# Patient Record
Sex: FEMALE | Race: OTHER | HISPANIC OR LATINO
[De-identification: names, ages, dates, MRNs, and addresses within clinical notes are randomized per-mention and may not be internally consistent; named-entity substitution may affect disease eponyms.]

---

## 2016-04-13 RX ORDER — CALCIUM ACETATE 667 MG
2 TABLET ORAL
Qty: 0 | Refills: 0 | DISCHARGE
Start: 2016-04-13

## 2017-02-23 ENCOUNTER — APPOINTMENT (OUTPATIENT)
Dept: VASCULAR SURGERY | Facility: CLINIC | Age: 72
End: 2017-02-23

## 2017-03-09 ENCOUNTER — APPOINTMENT (OUTPATIENT)
Dept: VASCULAR SURGERY | Facility: CLINIC | Age: 72
End: 2017-03-09

## 2017-03-09 VITALS
DIASTOLIC BLOOD PRESSURE: 67 MMHG | SYSTOLIC BLOOD PRESSURE: 125 MMHG | TEMPERATURE: 98.4 F | BODY MASS INDEX: 28.05 KG/M2 | HEART RATE: 67 BPM | HEIGHT: 57 IN | WEIGHT: 130 LBS

## 2017-03-22 ENCOUNTER — APPOINTMENT (OUTPATIENT)
Dept: NEUROLOGY | Facility: CLINIC | Age: 72
End: 2017-03-22

## 2017-03-22 VITALS
WEIGHT: 130 LBS | BODY MASS INDEX: 28.05 KG/M2 | HEART RATE: 65 BPM | SYSTOLIC BLOOD PRESSURE: 131 MMHG | HEIGHT: 57 IN | DIASTOLIC BLOOD PRESSURE: 68 MMHG

## 2017-03-22 RX ORDER — NORTRIPTYLINE HYDROCHLORIDE 25 MG/1
25 CAPSULE ORAL
Qty: 90 | Refills: 0 | Status: ACTIVE | COMMUNITY
Start: 2017-03-22 | End: 1900-01-01

## 2017-03-22 RX ORDER — IBUPROFEN 600 MG/1
600 TABLET, FILM COATED ORAL
Qty: 20 | Refills: 0 | Status: ACTIVE | COMMUNITY
Start: 2016-11-30

## 2017-04-30 ENCOUNTER — FORM ENCOUNTER (OUTPATIENT)
Age: 72
End: 2017-04-30

## 2017-05-01 ENCOUNTER — OUTPATIENT (OUTPATIENT)
Dept: OUTPATIENT SERVICES | Facility: HOSPITAL | Age: 72
LOS: 1 days | End: 2017-05-01
Payer: COMMERCIAL

## 2017-05-01 ENCOUNTER — APPOINTMENT (OUTPATIENT)
Dept: MRI IMAGING | Facility: CLINIC | Age: 72
End: 2017-05-01

## 2017-05-01 DIAGNOSIS — M79.605 PAIN IN LEFT LEG: ICD-10-CM

## 2017-05-01 DIAGNOSIS — T82.392A OTHER MECHANICAL COMPLICATION OF FEMORAL ARTERIAL GRAFT (BYPASS), INITIAL ENCOUNTER: Chronic | ICD-10-CM

## 2017-05-01 PROCEDURE — 72148 MRI LUMBAR SPINE W/O DYE: CPT

## 2017-05-12 ENCOUNTER — APPOINTMENT (OUTPATIENT)
Dept: NEUROLOGY | Facility: CLINIC | Age: 72
End: 2017-05-12

## 2017-05-12 VITALS
BODY MASS INDEX: 28.05 KG/M2 | HEIGHT: 57 IN | DIASTOLIC BLOOD PRESSURE: 72 MMHG | WEIGHT: 130 LBS | SYSTOLIC BLOOD PRESSURE: 128 MMHG

## 2017-05-12 RX ORDER — VENLAFAXINE HYDROCHLORIDE 75 MG/1
75 CAPSULE, EXTENDED RELEASE ORAL DAILY
Qty: 90 | Refills: 0 | Status: ACTIVE | COMMUNITY
Start: 2017-05-12 | End: 1900-01-01

## 2017-05-17 ENCOUNTER — APPOINTMENT (OUTPATIENT)
Dept: NEUROLOGY | Facility: CLINIC | Age: 72
End: 2017-05-17

## 2017-09-14 ENCOUNTER — APPOINTMENT (OUTPATIENT)
Dept: VASCULAR SURGERY | Facility: CLINIC | Age: 72
End: 2017-09-14

## 2017-09-21 ENCOUNTER — APPOINTMENT (OUTPATIENT)
Dept: VASCULAR SURGERY | Facility: CLINIC | Age: 72
End: 2017-09-21

## 2017-09-28 ENCOUNTER — APPOINTMENT (OUTPATIENT)
Dept: VASCULAR SURGERY | Facility: CLINIC | Age: 72
End: 2017-09-28

## 2017-10-12 ENCOUNTER — APPOINTMENT (OUTPATIENT)
Dept: VASCULAR SURGERY | Facility: CLINIC | Age: 72
End: 2017-10-12
Payer: MEDICARE

## 2017-10-12 VITALS
HEART RATE: 77 BPM | BODY MASS INDEX: 29.12 KG/M2 | WEIGHT: 135 LBS | HEIGHT: 57 IN | SYSTOLIC BLOOD PRESSURE: 144 MMHG | DIASTOLIC BLOOD PRESSURE: 79 MMHG | TEMPERATURE: 98.2 F

## 2017-10-12 DIAGNOSIS — Z89.612 ACQUIRED ABSENCE OF LEFT LEG ABOVE KNEE: ICD-10-CM

## 2017-10-12 DIAGNOSIS — G54.6 PHANTOM LIMB SYNDROME WITH PAIN: ICD-10-CM

## 2017-10-12 PROCEDURE — 99214 OFFICE O/P EST MOD 30 MIN: CPT | Mod: 25

## 2017-10-12 PROCEDURE — 93923 UPR/LXTR ART STDY 3+ LVLS: CPT

## 2017-10-13 PROBLEM — G54.6 PHANTOM LIMB SYNDROME WITH PAIN: Status: ACTIVE | Noted: 2017-03-22

## 2017-10-20 ENCOUNTER — APPOINTMENT (OUTPATIENT)
Dept: CT IMAGING | Facility: IMAGING CENTER | Age: 72
End: 2017-10-20
Payer: MEDICARE

## 2017-10-20 ENCOUNTER — OUTPATIENT (OUTPATIENT)
Dept: OUTPATIENT SERVICES | Facility: HOSPITAL | Age: 72
LOS: 1 days | End: 2017-10-20
Payer: COMMERCIAL

## 2017-10-20 DIAGNOSIS — Z00.8 ENCOUNTER FOR OTHER GENERAL EXAMINATION: ICD-10-CM

## 2017-10-20 DIAGNOSIS — T82.392A OTHER MECHANICAL COMPLICATION OF FEMORAL ARTERIAL GRAFT (BYPASS), INITIAL ENCOUNTER: Chronic | ICD-10-CM

## 2017-10-20 PROCEDURE — 73706 CT ANGIO LWR EXTR W/O&W/DYE: CPT

## 2017-10-20 PROCEDURE — 73706 CT ANGIO LWR EXTR W/O&W/DYE: CPT | Mod: 26,50

## 2017-11-22 ENCOUNTER — OTHER (OUTPATIENT)
Age: 72
End: 2017-11-22

## 2018-09-01 ENCOUNTER — OUTPATIENT (OUTPATIENT)
Dept: OUTPATIENT SERVICES | Facility: HOSPITAL | Age: 73
LOS: 1 days | End: 2018-09-01
Payer: MEDICARE

## 2018-09-01 DIAGNOSIS — T82.392A OTHER MECHANICAL COMPLICATION OF FEMORAL ARTERIAL GRAFT (BYPASS), INITIAL ENCOUNTER: Chronic | ICD-10-CM

## 2018-09-09 ENCOUNTER — EMERGENCY (EMERGENCY)
Facility: HOSPITAL | Age: 73
LOS: 1 days | Discharge: HOME | End: 2018-09-09
Attending: STUDENT IN AN ORGANIZED HEALTH CARE EDUCATION/TRAINING PROGRAM
Payer: COMMERCIAL

## 2018-09-09 VITALS
HEIGHT: 60 IN | WEIGHT: 149.91 LBS | SYSTOLIC BLOOD PRESSURE: 101 MMHG | HEART RATE: 67 BPM | OXYGEN SATURATION: 100 % | DIASTOLIC BLOOD PRESSURE: 58 MMHG | RESPIRATION RATE: 16 BRPM | TEMPERATURE: 98 F

## 2018-09-09 DIAGNOSIS — T82.392A OTHER MECHANICAL COMPLICATION OF FEMORAL ARTERIAL GRAFT (BYPASS), INITIAL ENCOUNTER: Chronic | ICD-10-CM

## 2018-09-09 LAB
ALBUMIN SERPL ELPH-MCNC: 3.6 G/DL — SIGNIFICANT CHANGE UP (ref 3.5–5)
ALP SERPL-CCNC: 132 U/L — HIGH (ref 40–120)
ALT FLD-CCNC: 23 U/L DA — SIGNIFICANT CHANGE UP (ref 10–60)
ANION GAP SERPL CALC-SCNC: 14 MMOL/L — SIGNIFICANT CHANGE UP (ref 5–17)
APPEARANCE UR: CLEAR — SIGNIFICANT CHANGE UP
AST SERPL-CCNC: 22 U/L — SIGNIFICANT CHANGE UP (ref 10–40)
BILIRUB SERPL-MCNC: 0.3 MG/DL — SIGNIFICANT CHANGE UP (ref 0.2–1.2)
BILIRUB UR-MCNC: NEGATIVE — SIGNIFICANT CHANGE UP
BUN SERPL-MCNC: 50 MG/DL — HIGH (ref 7–18)
CALCIUM SERPL-MCNC: 9 MG/DL — SIGNIFICANT CHANGE UP (ref 8.4–10.5)
CHLORIDE SERPL-SCNC: 97 MMOL/L — SIGNIFICANT CHANGE UP (ref 96–108)
CO2 SERPL-SCNC: 26 MMOL/L — SIGNIFICANT CHANGE UP (ref 22–31)
COLOR SPEC: YELLOW — SIGNIFICANT CHANGE UP
CREAT SERPL-MCNC: 7.55 MG/DL — HIGH (ref 0.5–1.3)
DIFF PNL FLD: ABNORMAL
GLUCOSE SERPL-MCNC: 63 MG/DL — LOW (ref 70–99)
GLUCOSE UR QL: 50 MG/DL
HCT VFR BLD CALC: 43.6 % — SIGNIFICANT CHANGE UP (ref 34.5–45)
HGB BLD-MCNC: 13.8 G/DL — SIGNIFICANT CHANGE UP (ref 11.5–15.5)
KETONES UR-MCNC: NEGATIVE — SIGNIFICANT CHANGE UP
LEUKOCYTE ESTERASE UR-ACNC: ABNORMAL
MCHC RBC-ENTMCNC: 30.2 PG — SIGNIFICANT CHANGE UP (ref 27–34)
MCHC RBC-ENTMCNC: 31.7 GM/DL — LOW (ref 32–36)
MCV RBC AUTO: 95.3 FL — SIGNIFICANT CHANGE UP (ref 80–100)
NITRITE UR-MCNC: NEGATIVE — SIGNIFICANT CHANGE UP
PH UR: 6 — SIGNIFICANT CHANGE UP (ref 5–8)
PLATELET # BLD AUTO: 180 K/UL — SIGNIFICANT CHANGE UP (ref 150–400)
POTASSIUM SERPL-MCNC: 4.4 MMOL/L — SIGNIFICANT CHANGE UP (ref 3.5–5.3)
POTASSIUM SERPL-SCNC: 4.4 MMOL/L — SIGNIFICANT CHANGE UP (ref 3.5–5.3)
PROT SERPL-MCNC: 8.1 G/DL — SIGNIFICANT CHANGE UP (ref 6–8.3)
PROT UR-MCNC: 100
RBC # BLD: 4.57 M/UL — SIGNIFICANT CHANGE UP (ref 3.8–5.2)
RBC # FLD: 16.2 % — HIGH (ref 10.3–14.5)
SODIUM SERPL-SCNC: 137 MMOL/L — SIGNIFICANT CHANGE UP (ref 135–145)
SP GR SPEC: 1.01 — SIGNIFICANT CHANGE UP (ref 1.01–1.02)
TROPONIN I SERPL-MCNC: <0.015 NG/ML — SIGNIFICANT CHANGE UP (ref 0–0.04)
UROBILINOGEN FLD QL: NEGATIVE — SIGNIFICANT CHANGE UP
WBC # BLD: 8.8 K/UL — SIGNIFICANT CHANGE UP (ref 3.8–10.5)
WBC # FLD AUTO: 8.8 K/UL — SIGNIFICANT CHANGE UP (ref 3.8–10.5)

## 2018-09-09 PROCEDURE — 82962 GLUCOSE BLOOD TEST: CPT

## 2018-09-09 PROCEDURE — 84484 ASSAY OF TROPONIN QUANT: CPT

## 2018-09-09 PROCEDURE — 71045 X-RAY EXAM CHEST 1 VIEW: CPT | Mod: 26

## 2018-09-09 PROCEDURE — 99283 EMERGENCY DEPT VISIT LOW MDM: CPT | Mod: 25

## 2018-09-09 PROCEDURE — 81001 URINALYSIS AUTO W/SCOPE: CPT

## 2018-09-09 PROCEDURE — 80053 COMPREHEN METABOLIC PANEL: CPT

## 2018-09-09 PROCEDURE — 85027 COMPLETE CBC AUTOMATED: CPT

## 2018-09-09 PROCEDURE — 71045 X-RAY EXAM CHEST 1 VIEW: CPT

## 2018-09-09 PROCEDURE — 93005 ELECTROCARDIOGRAM TRACING: CPT

## 2018-09-09 PROCEDURE — 99285 EMERGENCY DEPT VISIT HI MDM: CPT | Mod: 25

## 2018-09-09 RX ORDER — CEPHALEXIN 500 MG
1 CAPSULE ORAL
Qty: 14 | Refills: 0
Start: 2018-09-09 | End: 2018-09-15

## 2018-09-09 NOTE — ED ADULT NURSE NOTE - NSIMPLEMENTINTERV_GEN_ALL_ED
Implemented All Fall with Harm Risk Interventions:  [place] to call system. Call bell, personal items and telephone within reach. Instruct patient to call for assistance. Room bathroom lighting operational. Non-slip footwear when patient is off stretcher. Physically safe environment: no spills, clutter or unnecessary equipment. Stretcher in lowest position, wheels locked, appropriate side rails in place. Provide visual cue, wrist band, yellow gown, etc. Monitor gait and stability. Monitor for mental status changes and reorient to person, place, and time. Review medications for side effects contributing to fall risk. Reinforce activity limits and safety measures with patient and family. Provide visual clues: red socks.

## 2018-09-09 NOTE — ED PROVIDER NOTE - MEDICAL DECISION MAKING DETAILS
Patient well appearing. vital normal. Given risk factors concern for acs vs infection vs lyte abnormality vs anemia  no vertigo likely sensation, normal exam, no focal weakness, unlikely neuro related  will obtian basic lab, ekg, cxr, trop, ua  otherwise given no cp, sob, if trop negative, likely stable for discharge

## 2018-09-09 NOTE — ED PROVIDER NOTE - NEUROLOGICAL, MLM
Alert and oriented, no focal deficits, no motor or sensory deficits. stable gait, normal FTN, no ataxia

## 2018-09-09 NOTE — ED PROVIDER NOTE - PROGRESS NOTE DETAILS
Per patient and family, patient lightheadedness resolved. endorses feeling well. endorse that they want to go home to make her eye surgery appointment in the morning

## 2018-09-09 NOTE — ED PROVIDER NOTE - OBJECTIVE STATEMENT
lightheaded, no cp 72 y.o w/ pmh of ckd, dm, cad presenting with episode of lightheadedness in the morning. denies cp, sob, n, v, abd pain, fever, room spinning sensation, headache. otherwise endorses that lightheaded has been improving. denies difficulty ambulating.

## 2018-09-09 NOTE — ED PROVIDER NOTE - PMH
Anemia in chronic kidney disease  last PRBC transfusion January 2016  Asymmetrical hearing loss, left    Bacteremia  diagnosed 2 weeks ago   been treated with IV cefazolin 200 mg on TUES and THUR and 300 mg on SAT, for 21 days, last dose on 5/10/16  CAD (coronary artery disease)  S/P stent  Congestive heart failure  last admission8/2015  most recent echo on chart  Diabetes mellitus  type II,     fingerstick range in the morning 88- 102 mg/dl  ESRD (end stage renal disease) on dialysis  patiient on hemodialysis  HTN (hypertension)    Hyperlipidemia  dyslipidemia  Peripheral neuropathy    Peripheral vascular disease    Stented coronary artery

## 2018-09-09 NOTE — ED ADULT NURSE NOTE - OBJECTIVE STATEMENT
Pt c/o of general weakness, A&O x3, able to make needs known. Pt has a left AKA, uses wheelchair to bathroom at home, skin is intact.

## 2018-10-01 DIAGNOSIS — Z76.89 PERSONS ENCOUNTERING HEALTH SERVICES IN OTHER SPECIFIED CIRCUMSTANCES: ICD-10-CM

## 2018-10-01 DIAGNOSIS — Z71.89 OTHER SPECIFIED COUNSELING: ICD-10-CM

## 2018-11-30 ENCOUNTER — APPOINTMENT (OUTPATIENT)
Dept: VASCULAR SURGERY | Facility: CLINIC | Age: 73
End: 2018-11-30
Payer: MEDICARE

## 2018-11-30 VITALS
WEIGHT: 145 LBS | TEMPERATURE: 97.9 F | BODY MASS INDEX: 31.28 KG/M2 | HEIGHT: 57 IN | DIASTOLIC BLOOD PRESSURE: 57 MMHG | SYSTOLIC BLOOD PRESSURE: 132 MMHG | HEART RATE: 79 BPM

## 2018-11-30 DIAGNOSIS — I70.362: ICD-10-CM

## 2018-11-30 DIAGNOSIS — I73.9 PERIPHERAL VASCULAR DISEASE, UNSPECIFIED: ICD-10-CM

## 2018-11-30 DIAGNOSIS — M79.605 PAIN IN LEFT LEG: ICD-10-CM

## 2018-11-30 DIAGNOSIS — I96 GANGRENE, NOT ELSEWHERE CLASSIFIED: ICD-10-CM

## 2018-11-30 DIAGNOSIS — L97.422 NON-PRESSURE CHRONIC ULCER OF LEFT HEEL AND MIDFOOT WITH FAT LAYER EXPOSED: ICD-10-CM

## 2018-11-30 PROCEDURE — 99212 OFFICE O/P EST SF 10 MIN: CPT

## 2018-11-30 PROCEDURE — 93923 UPR/LXTR ART STDY 3+ LVLS: CPT

## 2019-05-01 PROCEDURE — G9005: CPT

## 2019-10-02 PROBLEM — Z89.612 HISTORY OF LEFT ABOVE KNEE AMPUTATION: Status: ACTIVE | Noted: 2017-03-08

## 2019-12-01 ENCOUNTER — OUTPATIENT (OUTPATIENT)
Dept: OUTPATIENT SERVICES | Facility: HOSPITAL | Age: 74
LOS: 1 days | End: 2019-12-01
Payer: MEDICARE

## 2019-12-01 DIAGNOSIS — T82.392A OTHER MECHANICAL COMPLICATION OF FEMORAL ARTERIAL GRAFT (BYPASS), INITIAL ENCOUNTER: Chronic | ICD-10-CM

## 2019-12-01 PROCEDURE — G9001: CPT

## 2019-12-26 ENCOUNTER — INPATIENT (INPATIENT)
Facility: HOSPITAL | Age: 74
LOS: 3 days | Discharge: ANOTHER HOSPITAL | DRG: 305 | End: 2019-12-30
Attending: INTERNAL MEDICINE | Admitting: INTERNAL MEDICINE
Payer: COMMERCIAL

## 2019-12-26 VITALS
SYSTOLIC BLOOD PRESSURE: 170 MMHG | WEIGHT: 134.92 LBS | HEART RATE: 79 BPM | OXYGEN SATURATION: 100 % | DIASTOLIC BLOOD PRESSURE: 92 MMHG | TEMPERATURE: 98 F | RESPIRATION RATE: 16 BRPM
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
## 2019-12-26 DIAGNOSIS — Z29.9 ENCOUNTER FOR PROPHYLACTIC MEASURES, UNSPECIFIED: ICD-10-CM

## 2019-12-26 DIAGNOSIS — T82.392A OTHER MECHANICAL COMPLICATION OF FEMORAL ARTERIAL GRAFT (BYPASS), INITIAL ENCOUNTER: Chronic | ICD-10-CM

## 2019-12-26 DIAGNOSIS — I25.10 ATHEROSCLEROTIC HEART DISEASE OF NATIVE CORONARY ARTERY WITHOUT ANGINA PECTORIS: ICD-10-CM
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
## 2019-12-26 DIAGNOSIS — N18.6 END STAGE RENAL DISEASE: ICD-10-CM

## 2019-12-26 DIAGNOSIS — F32.9 MAJOR DEPRESSIVE DISORDER, SINGLE EPISODE, UNSPECIFIED: ICD-10-CM

## 2019-12-26 DIAGNOSIS — I10 ESSENTIAL (PRIMARY) HYPERTENSION: ICD-10-CM

## 2019-12-26 DIAGNOSIS — R73.9 HYPERGLYCEMIA, UNSPECIFIED: ICD-10-CM

## 2019-12-26 LAB
ACETONE SERPL-MCNC: NEGATIVE — SIGNIFICANT CHANGE UP
ALBUMIN SERPL ELPH-MCNC: 4 G/DL — SIGNIFICANT CHANGE UP (ref 3.5–5)
ALP SERPL-CCNC: 133 U/L — HIGH (ref 40–120)
ALT FLD-CCNC: 27 U/L DA — SIGNIFICANT CHANGE UP (ref 10–60)
ANION GAP SERPL CALC-SCNC: 13 MMOL/L — SIGNIFICANT CHANGE UP (ref 5–17)
APTT BLD: 29.9 SEC — SIGNIFICANT CHANGE UP (ref 27.5–36.3)
AST SERPL-CCNC: 33 U/L — SIGNIFICANT CHANGE UP (ref 10–40)
BASE EXCESS BLDV CALC-SCNC: -4.2 MMOL/L — LOW (ref -2–2)
BASOPHILS # BLD AUTO: 0.04 K/UL — SIGNIFICANT CHANGE UP (ref 0–0.2)
BASOPHILS NFR BLD AUTO: 0.4 % — SIGNIFICANT CHANGE UP (ref 0–2)
BILIRUB SERPL-MCNC: 0.3 MG/DL — SIGNIFICANT CHANGE UP (ref 0.2–1.2)
BUN SERPL-MCNC: 71 MG/DL — HIGH (ref 7–18)
CALCIUM SERPL-MCNC: 11 MG/DL — HIGH (ref 8.4–10.5)
CHLORIDE SERPL-SCNC: 100 MMOL/L — SIGNIFICANT CHANGE UP (ref 96–108)
CO2 SERPL-SCNC: 22 MMOL/L — SIGNIFICANT CHANGE UP (ref 22–31)
CREAT SERPL-MCNC: 8.48 MG/DL — HIGH (ref 0.5–1.3)
EOSINOPHIL # BLD AUTO: 0.32 K/UL — SIGNIFICANT CHANGE UP (ref 0–0.5)
EOSINOPHIL NFR BLD AUTO: 3.5 % — SIGNIFICANT CHANGE UP (ref 0–6)
GLUCOSE BLDC GLUCOMTR-MCNC: 378 MG/DL — HIGH (ref 70–99)
GLUCOSE BLDC GLUCOMTR-MCNC: 405 MG/DL — HIGH (ref 70–99)
GLUCOSE BLDC GLUCOMTR-MCNC: 96 MG/DL — SIGNIFICANT CHANGE UP (ref 70–99)
GLUCOSE SERPL-MCNC: 526 MG/DL — CRITICAL HIGH (ref 70–99)
HCO3 BLDV-SCNC: 23 MMOL/L — SIGNIFICANT CHANGE UP (ref 21–29)
HCT VFR BLD CALC: 45.2 % — HIGH (ref 34.5–45)
HGB BLD-MCNC: 14.1 G/DL — SIGNIFICANT CHANGE UP (ref 11.5–15.5)
HOROWITZ INDEX BLDV+IHG-RTO: 21 — SIGNIFICANT CHANGE UP
IMM GRANULOCYTES NFR BLD AUTO: 0.6 % — SIGNIFICANT CHANGE UP (ref 0–1.5)
INR BLD: 0.91 RATIO — SIGNIFICANT CHANGE UP (ref 0.88–1.16)
LACTATE SERPL-SCNC: 1.4 MMOL/L — SIGNIFICANT CHANGE UP (ref 0.7–2)
LYMPHOCYTES # BLD AUTO: 0.87 K/UL — LOW (ref 1–3.3)
LYMPHOCYTES # BLD AUTO: 9.6 % — LOW (ref 13–44)
MCHC RBC-ENTMCNC: 29.7 PG — SIGNIFICANT CHANGE UP (ref 27–34)
MCHC RBC-ENTMCNC: 31.2 GM/DL — LOW (ref 32–36)
MCV RBC AUTO: 95.2 FL — SIGNIFICANT CHANGE UP (ref 80–100)
MONOCYTES # BLD AUTO: 0.54 K/UL — SIGNIFICANT CHANGE UP (ref 0–0.9)
MONOCYTES NFR BLD AUTO: 5.9 % — SIGNIFICANT CHANGE UP (ref 2–14)
NEUTROPHILS # BLD AUTO: 7.27 K/UL — SIGNIFICANT CHANGE UP (ref 1.8–7.4)
NEUTROPHILS NFR BLD AUTO: 80 % — HIGH (ref 43–77)
NRBC # BLD: 0 /100 WBCS — SIGNIFICANT CHANGE UP (ref 0–0)
PCO2 BLDV: 54 MMHG — HIGH (ref 35–50)
PH BLDV: 7.25 — LOW (ref 7.35–7.45)
PLATELET # BLD AUTO: 174 K/UL — SIGNIFICANT CHANGE UP (ref 150–400)
PO2 BLDV: 25 MMHG — SIGNIFICANT CHANGE UP (ref 25–45)
POTASSIUM SERPL-MCNC: 4.4 MMOL/L — SIGNIFICANT CHANGE UP (ref 3.5–5.3)
POTASSIUM SERPL-SCNC: 4.4 MMOL/L — SIGNIFICANT CHANGE UP (ref 3.5–5.3)
PROT SERPL-MCNC: 8.1 G/DL — SIGNIFICANT CHANGE UP (ref 6–8.3)
PROTHROM AB SERPL-ACNC: 10.1 SEC — SIGNIFICANT CHANGE UP (ref 10–12.9)
RBC # BLD: 4.75 M/UL — SIGNIFICANT CHANGE UP (ref 3.8–5.2)
RBC # FLD: 15.8 % — HIGH (ref 10.3–14.5)
SAO2 % BLDV: 34 % — LOW (ref 67–88)
SODIUM SERPL-SCNC: 135 MMOL/L — SIGNIFICANT CHANGE UP (ref 135–145)
WBC # BLD: 9.09 K/UL — SIGNIFICANT CHANGE UP (ref 3.8–10.5)
WBC # FLD AUTO: 9.09 K/UL — SIGNIFICANT CHANGE UP (ref 3.8–10.5)

## 2019-12-26 PROCEDURE — 71045 X-RAY EXAM CHEST 1 VIEW: CPT | Mod: 26

## 2019-12-26 PROCEDURE — 99285 EMERGENCY DEPT VISIT HI MDM: CPT

## 2019-12-26 PROCEDURE — 73562 X-RAY EXAM OF KNEE 3: CPT | Mod: 26,RT

## 2019-12-26 PROCEDURE — 72170 X-RAY EXAM OF PELVIS: CPT | Mod: 26

## 2019-12-26 RX ORDER — CARVEDILOL PHOSPHATE 80 MG/1
12.5 CAPSULE, EXTENDED RELEASE ORAL EVERY 12 HOURS
Refills: 0 | Status: DISCONTINUED | OUTPATIENT
Start: 2019-12-26 | End: 2019-12-30

## 2019-12-26 RX ORDER — PANTOPRAZOLE SODIUM 20 MG/1
40 TABLET, DELAYED RELEASE ORAL
Refills: 0 | Status: DISCONTINUED | OUTPATIENT
Start: 2019-12-26 | End: 2019-12-30

## 2019-12-26 RX ORDER — CALCITRIOL 0.5 UG/1
0.25 CAPSULE ORAL DAILY
Refills: 0 | Status: DISCONTINUED | OUTPATIENT
Start: 2019-12-26 | End: 2019-12-30

## 2019-12-26 RX ORDER — LOSARTAN POTASSIUM 100 MG/1
25 TABLET, FILM COATED ORAL DAILY
Refills: 0 | Status: DISCONTINUED | OUTPATIENT
Start: 2019-12-26 | End: 2019-12-30

## 2019-12-26 RX ORDER — HEPARIN SODIUM 5000 [USP'U]/ML
5000 INJECTION INTRAVENOUS; SUBCUTANEOUS EVERY 12 HOURS
Refills: 0 | Status: DISCONTINUED | OUTPATIENT
Start: 2019-12-26 | End: 2019-12-30

## 2019-12-26 RX ORDER — CALCIUM ACETATE 667 MG
1334 TABLET ORAL
Refills: 0 | Status: DISCONTINUED | OUTPATIENT
Start: 2019-12-26 | End: 2019-12-30

## 2019-12-26 RX ORDER — SODIUM CHLORIDE 9 MG/ML
500 INJECTION INTRAMUSCULAR; INTRAVENOUS; SUBCUTANEOUS ONCE
Refills: 0 | Status: COMPLETED | OUTPATIENT
Start: 2019-12-26 | End: 2019-12-26

## 2019-12-26 RX ORDER — INSULIN HUMAN 100 [IU]/ML
10 INJECTION, SOLUTION SUBCUTANEOUS ONCE
Refills: 0 | Status: COMPLETED | OUTPATIENT
Start: 2019-12-26 | End: 2019-12-26

## 2019-12-26 RX ORDER — DULOXETINE HYDROCHLORIDE 30 MG/1
20 CAPSULE, DELAYED RELEASE ORAL EVERY 12 HOURS
Refills: 0 | Status: DISCONTINUED | OUTPATIENT
Start: 2019-12-26 | End: 2019-12-30

## 2019-12-26 RX ORDER — INSULIN LISPRO 100/ML
VIAL (ML) SUBCUTANEOUS
Refills: 0 | Status: DISCONTINUED | OUTPATIENT
Start: 2019-12-26 | End: 2019-12-30

## 2019-12-26 RX ORDER — CLOPIDOGREL BISULFATE 75 MG/1
75 TABLET, FILM COATED ORAL DAILY
Refills: 0 | Status: DISCONTINUED | OUTPATIENT
Start: 2019-12-26 | End: 2019-12-30

## 2019-12-26 RX ORDER — ATORVASTATIN CALCIUM 80 MG/1
20 TABLET, FILM COATED ORAL AT BEDTIME
Refills: 0 | Status: DISCONTINUED | OUTPATIENT
Start: 2019-12-26 | End: 2019-12-30

## 2019-12-26 RX ORDER — ACETAMINOPHEN 500 MG
650 TABLET ORAL ONCE
Refills: 0 | Status: COMPLETED | OUTPATIENT
Start: 2019-12-26 | End: 2019-12-26

## 2019-12-26 RX ADMIN — Medication 650 MILLIGRAM(S): at 12:20

## 2019-12-26 RX ADMIN — Medication 650 MILLIGRAM(S): at 14:51

## 2019-12-26 RX ADMIN — INSULIN HUMAN 10 UNIT(S): 100 INJECTION, SOLUTION SUBCUTANEOUS at 13:33

## 2019-12-26 RX ADMIN — Medication 12: at 16:11

## 2019-12-26 RX ADMIN — SODIUM CHLORIDE 500 MILLILITER(S): 9 INJECTION INTRAMUSCULAR; INTRAVENOUS; SUBCUTANEOUS at 12:20

## 2019-12-26 NOTE — H&P ADULT - NSHPSOCIALHISTORY_GEN_ALL_CORE
pt does not smoke, drink etoh or has substance use  pt walks with prosthetic left leg   lives with sister

## 2019-12-26 NOTE — ED ADULT NURSE NOTE - NSIMPLEMENTINTERV_GEN_ALL_ED
Implemented All Fall with Harm Risk Interventions:  [place] to call system. Call bell, personal items and telephone within reach. Instruct patient to call for assistance. Room bathroom lighting operational. Non-slip footwear when patient is off stretcher. Physically safe environment: no spills, clutter or unnecessary equipment. Stretcher in lowest position, wheels locked, appropriate side rails in place. Provide visual cue, wrist band, yellow gown, etc. Monitor gait and stability. Monitor for mental status changes and reorient to person, place, and time. Review medications for side effects contributing to fall risk. Reinforce activity limits and safety measures with patient and family. Provide visual clues: red socks.

## 2019-12-26 NOTE — H&P ADULT - PROBLEM SELECTOR PLAN 2
pt came in with hyperglycemia of 500   improved to 96   pt takes long acting 18 units and hss  c/w hss for now  f/u hba1c

## 2019-12-26 NOTE — H&P ADULT - HISTORY OF PRESENT ILLNESS
74 year old female with PMhx of HTN, HLD, CAD, DM, osteo arthritis and ESRD (Tu,Th,Sat, on Dialysis last completed on Monday, [facility] kidney [facility]) and PSHx of amputated left leg above the knee presenting with home attendant complaining of high blood glucose and fall last night.  Per sister at bedside, pt has severe arthritis in her right knee and last night when pt was trying to use commode, pt fell down because of her right knee pain and fell on right knee. Pt did not loss consciousness, or hit her head. Patient states that when she fell, she landed on her right knee which is now in pain. Patient denies any head trauma or HA, LOC, dizziness, chest pain, shortness of breath, vomiting, abd pain, dysuria, or any other acute complaints.

## 2019-12-26 NOTE — ED PROVIDER NOTE - PHYSICAL EXAMINATION
Afebrile, hemodynamically stable, saturating well  NAD, well appearing, no increased WOB  Head NCAT  Pupils equal, EOMI grossly, anicteric  MMM  RRR, nml S1/S2, no m/r/g  Lungs CTAB, no w/r/r  Abd soft, NT, ND, nml BS, no rebound or guarding  AAO, CN's 3-12 grossly intact  Mild R knee TTP, no stepoff/deformity/bruising to entire extremity, nml warmth/color/sensation  Skin warm, well perfused, no rashes or hives

## 2019-12-26 NOTE — ED PROVIDER NOTE - CARE PLAN
Principal Discharge DX:	Fall from bed, initial encounter  Secondary Diagnosis:	Hyperglycemia  Secondary Diagnosis:	Acute pain of right knee Principal Discharge DX:	Fall from bed, initial encounter  Secondary Diagnosis:	Hyperglycemia  Secondary Diagnosis:	Acute pain of right knee  Secondary Diagnosis:	ESRD (end stage renal disease) on dialysis

## 2019-12-26 NOTE — PATIENT PROFILE ADULT - ABILITY TO HEAR (WITH HEARING AID OR HEARING APPLIANCE IF NORMALLY USED):
cannot hear from left ear/Mildly to Moderately Impaired: difficulty hearing in some environments or speaker may need to increase volume or speak distinctly

## 2019-12-26 NOTE — H&P ADULT - ASSESSMENT
74 year old female with PMhx of HTN, HLD, CAD, DM, osteo arthritis and ESRD (Tu,Th,Sat, on Dialysis last completed on Monday, [facility] kidney [facility]) and PSHx of amputated left leg above the knee presenting with home attendant complaining of high blood glucose and fall last night.

## 2019-12-26 NOTE — H&P ADULT - PROBLEM SELECTOR PLAN 1
mechanical fall 2/2 arthritis   xray R knee and pelvic negative for fracture   will do pain management and physical therapy

## 2019-12-26 NOTE — ED PROVIDER NOTE - OBJECTIVE STATEMENT
used Number: [id number]  74 year old female with PMhx of DM and ESRF (on Dialysis last completed on Monday) and PSHx of amputated left leg above the knee presenting with home attendant complaining of high blood glucose and fall last night. According to home health aide, while patient was at home, she slipped trying to get out of bed at around 2AM. Patient states that when she fell, she landed on her right knee which is now in pain. Patient denies any head trauma, LOC, dizziness, chest pain, shortness of breath, vomiting, abd pain, or any other acute complaints. Patient medications include aspirin, carvedilol, Plavix, Losartan, atorvastatin, Lantus (13 units at night), and Novolog 3/6 units (scaled). Patient is non smoker. 74 year old female with PMhx of DM and ESRD (on Dialysis last completed on Monday, uncertain location/doctor) and PSHx of amputated left leg above the knee presenting with home attendant complaining of high blood glucose and fall last night. Pt states she slipped trying to get out of bed at around 2AM. Patient states that when she fell, she landed on her right knee which is now in pain. Patient denies any head trauma or HA, LOC, dizziness, chest pain, shortness of breath, vomiting, abd pain, dysuria, or any other acute complaints. Patient medications include aspirin, carvedilol, Plavix, Losartan, atorvastatin, Lantus (13 units at night), and Novolog 3/6 units (scaled). Patient is non smoker. HHA here and unable to provide further hx. 74 year old female with PMhx of DM and ESRD (Tu,Th,Sat, on Dialysis last completed on Monday, uncertain location/doctor) and PSHx of amputated left leg above the knee presenting with home attendant complaining of high blood glucose and fall last night. Pt states she slipped onto the floor when trying to get out of bed at around 2AM. Patient states that when she fell, she landed on her right knee which is now in pain. Patient denies any head trauma or HA, LOC, dizziness, chest pain, shortness of breath, vomiting, abd pain, dysuria, or any other acute complaints. Patient medications include aspirin, carvedilol, Plavix, Losartan, atorvastatin, Lantus (13 units at night), and Novolog 3/6 units (scaled). Patient is non smoker. HHA here and unable to provide further hx.

## 2019-12-26 NOTE — ED PROVIDER NOTE - CLINICAL SUMMARY MEDICAL DECISION MAKING FREE TEXT BOX
No e/o head trauma, cord involvement, chest or abdominal trauma or process. No e/o extremity fracture and neurovascularly intact extremity. Mechanical fall with no arrhythmia. No new anemia or bleeding. No arrhythmia. No e/o DVT or suggest of PE. No gross electrolyte abnormality. No e/o head trauma, cord involvement, chest or abdominal trauma or process. No e/o extremity fracture and neurovascularly intact extremity. Mechanical fall with no arrhythmia. No new anemia or bleeding. No arrhythmia. No e/o DVT or suggest of PE. No gross electrolyte abnormality other than hyperglycemia. No e/o DKA. Given 500 bolus fluids, insulin subQ. Patient well appearing, hemodynamically stable. Admitted to internal medicine for further monitoring, w/u, and care.

## 2019-12-26 NOTE — ED PROVIDER NOTE - PSH
Arteriovenous fistula  Left  AVF (arteriovenous fistula)  right anterior chest wall (placed May 2015 at [facility])  Femoral-popliteal bypass graft occlusion, left    S/P cataract surgery  Bilateral

## 2019-12-26 NOTE — H&P ADULT - NSHPPHYSICALEXAM_GEN_ALL_CORE
Vital Signs Last 24 Hrs  T(C): 36.3 (26 Dec 2019 15:42), Max: 37.2 (26 Dec 2019 10:05)  T(F): 97.4 (26 Dec 2019 15:42), Max: 98.9 (26 Dec 2019 10:05)  HR: 69 (26 Dec 2019 15:42) (69 - 79)  BP: 177/48 (26 Dec 2019 15:42) (170/92 - 177/48)  BP(mean): --  RR: 17 (26 Dec 2019 15:42) (16 - 17)  SpO2: 100% (26 Dec 2019 15:42) (100% - 100%)    PHYSICAL EXAM:  GENERAL: NAD, well-developed  HEAD:  Atraumatic, Normocephalic  EYES: EOMI, PERRLA, conjunctiva and sclera clear  NECK: Supple, No JVD  CHEST/LUNG: Clear to auscultation bilaterally; No wheeze, has horseshoe dialysis access on left chest    HEART: Regular rate and rhythm; s1+ s2+, systolic murmur   ABDOMEN: Soft, Nontender, Nondistended; Bowel sounds present  EXTREMITIES: No clubbing, cyanosis, or edema, s/p left AKA, no ulcer, erythema or swelling around stump.    NEUROLOGY:AAOx3 non-focal  SKIN: No rashes or lesions

## 2019-12-26 NOTE — ED ADULT NURSE NOTE - OBJECTIVE STATEMENT
general weakness, unable to get up from toilet seat. As per EMS glucose/FS was HI. left AKA , dialysis Mon, Wed, Fri, left chest port

## 2019-12-27 DIAGNOSIS — T87.89 OTHER COMPLICATIONS OF AMPUTATION STUMP: ICD-10-CM

## 2019-12-27 DIAGNOSIS — Z71.89 OTHER SPECIFIED COUNSELING: ICD-10-CM

## 2019-12-27 LAB
24R-OH-CALCIDIOL SERPL-MCNC: 33.5 NG/ML — SIGNIFICANT CHANGE UP (ref 30–80)
ALBUMIN SERPL ELPH-MCNC: 3.1 G/DL — LOW (ref 3.5–5)
ALP SERPL-CCNC: 102 U/L — SIGNIFICANT CHANGE UP (ref 40–120)
ALT FLD-CCNC: 20 U/L DA — SIGNIFICANT CHANGE UP (ref 10–60)
ANION GAP SERPL CALC-SCNC: 8 MMOL/L — SIGNIFICANT CHANGE UP (ref 5–17)
AST SERPL-CCNC: 31 U/L — SIGNIFICANT CHANGE UP (ref 10–40)
BILIRUB SERPL-MCNC: 0.3 MG/DL — SIGNIFICANT CHANGE UP (ref 0.2–1.2)
BUN SERPL-MCNC: 32 MG/DL — HIGH (ref 7–18)
CALCIUM SERPL-MCNC: 9.4 MG/DL — SIGNIFICANT CHANGE UP (ref 8.4–10.5)
CALCIUM SERPL-MCNC: 9.6 MG/DL — SIGNIFICANT CHANGE UP (ref 8.4–10.5)
CHLORIDE SERPL-SCNC: 101 MMOL/L — SIGNIFICANT CHANGE UP (ref 96–108)
CHOLEST SERPL-MCNC: 132 MG/DL — SIGNIFICANT CHANGE UP (ref 10–199)
CO2 SERPL-SCNC: 28 MMOL/L — SIGNIFICANT CHANGE UP (ref 22–31)
CREAT SERPL-MCNC: 5.63 MG/DL — HIGH (ref 0.5–1.3)
FERRITIN SERPL-MCNC: 951 NG/ML — HIGH (ref 15–150)
GLUCOSE BLDC GLUCOMTR-MCNC: 195 MG/DL — HIGH (ref 70–99)
GLUCOSE BLDC GLUCOMTR-MCNC: 217 MG/DL — HIGH (ref 70–99)
GLUCOSE BLDC GLUCOMTR-MCNC: 275 MG/DL — HIGH (ref 70–99)
GLUCOSE BLDC GLUCOMTR-MCNC: 326 MG/DL — HIGH (ref 70–99)
GLUCOSE SERPL-MCNC: 182 MG/DL — HIGH (ref 70–99)
HBA1C BLD-MCNC: 9.9 % — HIGH (ref 4–5.6)
HBV SURFACE AB SER-ACNC: REACTIVE
HBV SURFACE AG SER-ACNC: SIGNIFICANT CHANGE UP
HCT VFR BLD CALC: 38.5 % — SIGNIFICANT CHANGE UP (ref 34.5–45)
HDLC SERPL-MCNC: 61 MG/DL — SIGNIFICANT CHANGE UP
HGB BLD-MCNC: 12.3 G/DL — SIGNIFICANT CHANGE UP (ref 11.5–15.5)
IRON SATN MFR SERPL: 38 % — SIGNIFICANT CHANGE UP (ref 15–50)
IRON SATN MFR SERPL: 52 UG/DL — SIGNIFICANT CHANGE UP (ref 40–150)
LIPID PNL WITH DIRECT LDL SERPL: 34 MG/DL — SIGNIFICANT CHANGE UP
MAGNESIUM SERPL-MCNC: 2.4 MG/DL — SIGNIFICANT CHANGE UP (ref 1.6–2.6)
MCHC RBC-ENTMCNC: 29.6 PG — SIGNIFICANT CHANGE UP (ref 27–34)
MCHC RBC-ENTMCNC: 31.9 GM/DL — LOW (ref 32–36)
MCV RBC AUTO: 92.8 FL — SIGNIFICANT CHANGE UP (ref 80–100)
NRBC # BLD: 0 /100 WBCS — SIGNIFICANT CHANGE UP (ref 0–0)
PHOSPHATE SERPL-MCNC: 3.6 MG/DL — SIGNIFICANT CHANGE UP (ref 2.5–4.5)
PLATELET # BLD AUTO: 165 K/UL — SIGNIFICANT CHANGE UP (ref 150–400)
POTASSIUM SERPL-MCNC: 3.3 MMOL/L — LOW (ref 3.5–5.3)
POTASSIUM SERPL-SCNC: 3.3 MMOL/L — LOW (ref 3.5–5.3)
PROT SERPL-MCNC: 6.3 G/DL — SIGNIFICANT CHANGE UP (ref 6–8.3)
PTH-INTACT FLD-MCNC: 114 PG/ML — HIGH (ref 15–65)
RBC # BLD: 4.15 M/UL — SIGNIFICANT CHANGE UP (ref 3.8–5.2)
RBC # FLD: 15.6 % — HIGH (ref 10.3–14.5)
SODIUM SERPL-SCNC: 137 MMOL/L — SIGNIFICANT CHANGE UP (ref 135–145)
TIBC SERPL-MCNC: 135 UG/DL — LOW (ref 250–450)
TOTAL CHOLESTEROL/HDL RATIO MEASUREMENT: 2.2 RATIO — LOW (ref 3.3–7.1)
TRIGL SERPL-MCNC: 184 MG/DL — HIGH (ref 10–149)
TSH SERPL-MCNC: 2.2 UU/ML — SIGNIFICANT CHANGE UP (ref 0.34–4.82)
UIBC SERPL-MCNC: 83 UG/DL — LOW (ref 110–370)
VIT B12 SERPL-MCNC: 1103 PG/ML — SIGNIFICANT CHANGE UP (ref 232–1245)
WBC # BLD: 7.94 K/UL — SIGNIFICANT CHANGE UP (ref 3.8–10.5)
WBC # FLD AUTO: 7.94 K/UL — SIGNIFICANT CHANGE UP (ref 3.8–10.5)

## 2019-12-27 RX ORDER — POTASSIUM CHLORIDE 20 MEQ
40 PACKET (EA) ORAL ONCE
Refills: 0 | Status: COMPLETED | OUTPATIENT
Start: 2019-12-27 | End: 2019-12-27

## 2019-12-27 RX ORDER — ACETAMINOPHEN 500 MG
650 TABLET ORAL ONCE
Refills: 0 | Status: COMPLETED | OUTPATIENT
Start: 2019-12-27 | End: 2019-12-27

## 2019-12-27 RX ORDER — ACETAMINOPHEN 500 MG
650 TABLET ORAL EVERY 6 HOURS
Refills: 0 | Status: DISCONTINUED | OUTPATIENT
Start: 2019-12-27 | End: 2019-12-30

## 2019-12-27 RX ORDER — CHLORHEXIDINE GLUCONATE 213 G/1000ML
1 SOLUTION TOPICAL
Refills: 0 | Status: DISCONTINUED | OUTPATIENT
Start: 2019-12-27 | End: 2019-12-30

## 2019-12-27 RX ADMIN — Medication 4: at 22:26

## 2019-12-27 RX ADMIN — Medication 40 MILLIEQUIVALENT(S): at 17:24

## 2019-12-27 RX ADMIN — LOSARTAN POTASSIUM 25 MILLIGRAM(S): 100 TABLET, FILM COATED ORAL at 06:53

## 2019-12-27 RX ADMIN — Medication 1334 MILLIGRAM(S): at 17:26

## 2019-12-27 RX ADMIN — Medication 1334 MILLIGRAM(S): at 12:17

## 2019-12-27 RX ADMIN — Medication 2: at 09:01

## 2019-12-27 RX ADMIN — Medication 650 MILLIGRAM(S): at 12:16

## 2019-12-27 RX ADMIN — CLOPIDOGREL BISULFATE 75 MILLIGRAM(S): 75 TABLET, FILM COATED ORAL at 12:17

## 2019-12-27 RX ADMIN — CALCITRIOL 0.25 MICROGRAM(S): 0.5 CAPSULE ORAL at 12:17

## 2019-12-27 RX ADMIN — Medication 650 MILLIGRAM(S): at 13:00

## 2019-12-27 RX ADMIN — Medication 1334 MILLIGRAM(S): at 09:01

## 2019-12-27 RX ADMIN — CARVEDILOL PHOSPHATE 12.5 MILLIGRAM(S): 80 CAPSULE, EXTENDED RELEASE ORAL at 05:47

## 2019-12-27 RX ADMIN — ATORVASTATIN CALCIUM 20 MILLIGRAM(S): 80 TABLET, FILM COATED ORAL at 22:25

## 2019-12-27 RX ADMIN — DULOXETINE HYDROCHLORIDE 20 MILLIGRAM(S): 30 CAPSULE, DELAYED RELEASE ORAL at 17:26

## 2019-12-27 RX ADMIN — CARVEDILOL PHOSPHATE 12.5 MILLIGRAM(S): 80 CAPSULE, EXTENDED RELEASE ORAL at 18:31

## 2019-12-27 RX ADMIN — HEPARIN SODIUM 5000 UNIT(S): 5000 INJECTION INTRAVENOUS; SUBCUTANEOUS at 17:25

## 2019-12-27 RX ADMIN — HEPARIN SODIUM 5000 UNIT(S): 5000 INJECTION INTRAVENOUS; SUBCUTANEOUS at 05:47

## 2019-12-27 RX ADMIN — PANTOPRAZOLE SODIUM 40 MILLIGRAM(S): 20 TABLET, DELAYED RELEASE ORAL at 05:48

## 2019-12-27 RX ADMIN — DULOXETINE HYDROCHLORIDE 20 MILLIGRAM(S): 30 CAPSULE, DELAYED RELEASE ORAL at 05:47

## 2019-12-27 RX ADMIN — Medication 6: at 12:17

## 2019-12-27 RX ADMIN — Medication 8: at 17:24

## 2019-12-27 NOTE — ACUTE INTERFACILITY TRANSFER NOTE - HOSPITAL COURSE
74 year old female with PMhx of HTN, HLD, CAD, DM, osteo arthritis and ESRD (Tu,Th,Sat, on Dialysis last completed on Monday, [facility] kidney [facility]) and PSHx of amputated left leg above the knee presenting with home attendant complaining of high blood glucose and fall last night.  Per sister at bedside, pt has severe arthritis in her right knee and last night when pt was trying to use commode, pt fell down because of her right knee pain and fell on right knee. Pt did not loss consciousness, or hit her head. Patient states that when she fell, she landed on her right knee which is now in pain. Patient denies any head trauma or HA, LOC, dizziness, chest pain, shortness of breath, vomiting, abd pain, dysuria, or any other acute complaints.  Pt was admitted for hyperglycemia >500 and work up for mechanical fall.  Xray R knee and pelvic was negative for fracture. Hba1c was 9.9, Endo was consulted.   Pt had stress test in 9/2019 at [place] [facility], which showed moderate to severe inferolateral, anterolateral and anterior wall ischemia. Pt was scheduled for cardiac cath on 10/2019, but cancelled because pt had flu. c/w coreg, plavix, lipitor. 74 year old female with PMhx of HTN, HLD, CAD, DM, osteo arthritis and ESRD (Tu,Th,Sat, on Dialysis last completed on Monday, [facility] kidney [facility]) and PSHx of amputated left leg above the knee presenting with home attendant complaining of high blood glucose and fall last night.  Per sister at bedside, pt has severe arthritis in her right knee and last night when pt was trying to use commode, pt fell down because of her right knee pain and fell on right knee. Pt did not loss consciousness, or hit her head. Patient states that when she fell, she landed on her right knee which is now in pain. Patient denies any head trauma or HA, LOC, dizziness, chest pain, shortness of breath, vomiting, abd pain, dysuria, or any other acute complaints.  Pt was admitted for hyperglycemia >500 and work up for mechanical fall.  Xray R knee and pelvic was negative for fracture. Hba1c was 9.9, Endo was consulted.   Pt had stress test in 9/2019 at [place] [facility], which showed moderate to severe inferolateral, anterolateral and anterior wall ischemia. Pt was scheduled for cardiac cath on 10/2019, but cancelled because pt had flu. c/w coreg, plavix, lipitor.  Endo was consulted and recommended Lantus 14U and humalog sliding scale. Pt had HD session on 12/26 and 12/28.  Pt has stump site pain and controlled with pain medication. physical therapy recommended  rehabilitation facility; (pending functional progress).   Pt is stable for transfer to [facility].

## 2019-12-27 NOTE — PHYSICAL THERAPY INITIAL EVALUATION ADULT - GENERAL OBSERVATIONS, REHAB EVAL
Pt received supine in bed, c/o right knee and left stump pain (left AKA) (9/10), worst with mobility-RN aware. Pt was cooperative during assessment

## 2019-12-27 NOTE — CONSULT NOTE ADULT - SUBJECTIVE AND OBJECTIVE BOX
HISTORY OF PRESENT ILLNESS: HPI:  74 year old female with PMhx of HTN, HLD, CAD, s/p PCI to LCX in 2015, recent Ischemia noted on stress in 9/19 cancelled cath due to flu, DM, osteo arthritis and ESRD (Tu,Th,Sat, on Dialysis last completed on Monday, [facility] kidney [facility]) and PSHx of amputated left leg above the knee presenting with home attendant complaining of high blood glucose and fall last night.  Per sister at bedside, pt has severe arthritis in her right knee and last night when pt was trying to use commode, pt fell down because of her right knee pain and fell on right knee. Pt did not loss consciousness, or hit her head. Patient states that when she fell, she landed on her right knee which is now in pain. Patient denies any head trauma or HA, LOC, dizziness, chest pain, shortness of breath, vomiting, abd pain, dysuria, or any other acute complaints. (26 Dec 2019 15:40)      PAST MEDICAL & SURGICAL HISTORY:  Bacteremia: diagnosed 2 weeks ago   been treated with IV cefazolin 200 mg on TUES and THUR and 300 mg on SAT, for 21 days, last dose on 5/10/16  Asymmetrical hearing loss, left  Anemia in chronic kidney disease: last PRBC transfusion January 2016  Stented coronary artery  CAD (coronary artery disease): S/P stent  Peripheral vascular disease  Peripheral neuropathy  HTN (hypertension)  Diabetes mellitus: type II,     fingerstick range in the morning 88- 102 mg/dl  Congestive heart failure: last admission8/2015  most recent echo on chart  Hyperlipidemia: dyslipidemia  ESRD (end stage renal disease) on dialysis: patiient on hemodialysis  Femoral-popliteal bypass graft occlusion, left  Arteriovenous fistula: Left  S/P cataract surgery: Bilateral  AVF (arteriovenous fistula): right anterior chest wall (placed May 2015 at [facility])          MEDICATIONS:  MEDICATIONS  (STANDING):  atorvastatin 20 milliGRAM(s) Oral at bedtime  calcitriol   Capsule 0.25 MICROGram(s) Oral daily  calcium acetate 1334 milliGRAM(s) Oral three times a day with meals  carvedilol 12.5 milliGRAM(s) Oral every 12 hours  clopidogrel Tablet 75 milliGRAM(s) Oral daily  DULoxetine 20 milliGRAM(s) Oral every 12 hours  heparin  Injectable 5000 Unit(s) SubCutaneous every 12 hours  insulin lispro (HumaLOG) corrective regimen sliding scale   SubCutaneous Before meals and at bedtime  losartan 25 milliGRAM(s) Oral daily  pantoprazole    Tablet 40 milliGRAM(s) Oral before breakfast      Allergies    No Known Allergies    Intolerances        FAMILY HISTORY:  Family history of diabetes mellitus    Non-contributary for premature coronary disease or sudden cardiac death    SOCIAL HISTORY:    [X ] Non-smoker  [ ] Smoker  [ ] Alcohol      REVIEW OF SYSTEMS:  [ ]chest pain  [  ]shortness of breath  [  ]palpitations  [  ]syncope  [ ]near syncope [ ]upper extremity weakness   [ ] lower extremity weakness  [  ]diplopia  [  ]altered mental status   [  ]fevers  [ ]chills [ ]nausea  [ ]vomitting  [  ]dysphagia    [ ]abdominal pain  [ ]melena  [ ]BRBPR    [  ]epistaxis  [  ]rash    [ ]lower extremity edema        [ X] All others negative	  [ ] Unable to obtain      LABS:	 	    CARDIAC MARKERS:                              12.3   7.94  )-----------( 165      ( 27 Dec 2019 07:02 )             38.5     Hb Trend: 12.3<--, 14.1<--    12-27    137  |  101  |  32<H>  ----------------------------<  182<H>  3.3<L>   |  28  |  5.63<H>    Ca    9.4      27 Dec 2019 07:02  Phos  3.6     12-27  Mg     2.4     12-27    TPro  6.3  /  Alb  3.1<L>  /  TBili  0.3  /  DBili  x   /  AST  31  /  ALT  20  /  AlkPhos  102  12-27    Creatinine Trend: 5.63<--, 8.48<--    HgA1c: Hemoglobin A1C, Whole Blood: 9.9 % (12-27 @ 09:04)    TSH: Thyroid Stimulating Hormone, Serum: 2.20 uU/mL (12-27 @ 07:02)    PHYSICAL EXAM:  T(C): 36.7 (12-27-19 @ 05:05), Max: 36.9 (12-26-19 @ 18:35)  HR: 73 (12-27-19 @ 05:05) (67 - 73)  BP: 168/67 (12-27-19 @ 05:05) (123/52 - 177/48)  RR: 17 (12-27-19 @ 05:05) (16 - 17)  SpO2: 97% (12-27-19 @ 05:05) (97% - 100%)  Wt(kg): --     I&O's Summary      Gen: Left AKA  HEENT:  (-)icterus (-)pallor  CV: N S1 S2 1/6 COCO (+)2 Pulses B/l  Resp:  Clear to ausculatation B/L, normal effort  GI: (+) BS Soft, NT, ND  Lymph:  (-)Edema, (-)obvious lymphadenopathy  Skin: Warm to touch, Normal turgor  Psych: Appropriate mood and affect      ECG:  	Sinus , LAFB, nonspecific T wave abnormality    RADIOLOGY:         CXR:  No infiltrate     ASSESSMENT/PLAN: 	74y Female PMhx of HTN, HLD, CAD, s/p PCI to LCX in 2015, recent Ischemia noted on stress in 9/19 cancelled cath due to flu, DM, osteo arthritis and ESRD (Tu,Th,Sat, on Dialysis Left AKA, admitted with fall and hyperglycemia.    - Echo  - Glycemic control per med  - Stress with moderate to severe ischemia in the mid anterior, anterolateral and inferolateral walls  - Plan for cath once medically optimized.  - Office records placed in chart    I once again thank you for allowing me to participate in the care of our mutual patient.  If you have any questions or concerns please do not hesitate to contact me.    Carlos Diamond MD, [facility]  BEEPER [phone]

## 2019-12-27 NOTE — PROGRESS NOTE ADULT - PROBLEM SELECTOR PLAN 4
mechanical fall 2/2 arthritis   xray R knee and pelvic negative for fracture   Pain management w/ tylenol PRN  physical therapy: rehabilitation facility; pending functional progress

## 2019-12-27 NOTE — CONSULT NOTE ADULT - SUBJECTIVE AND OBJECTIVE BOX
[facility] Consult Note Nephrology - CONSULTATION NOTE - [phone] - Dr Bueno / Dr Mancini / Dr Russell / Dr La / Dr Saenz / Dr Covarrubias / Dr Arriaza / Dr Dykes    Patient is a 74y Female with HTN, HLD, CAD, DM, osteo arthritis and ESRD (Tu,Th,Sat, Dialysis) presenting with high blood glucose and fall night prior to admission.  Per sister at bedside, pt has severe arthritis in her right knee when pt was trying to use commode, pt fell down because of her right knee pain and fell on right knee. She did not loss consciousness, or hit her head. Patient states that when she fell, she landed on her right knee which is now in pain. Patient denies any head trauma or HA, LOC, dizziness, chest pain, shortness of breath, vomiting, abd pain, dysuria, or any other acute complaints. Renal consult for Hd management.   Admission labs significatn for hyperglycemia on admission.   Currently pt feeling well, denies SOB or chest pain. Some soreness in right knee.     PAST MEDICAL & SURGICAL HISTORY:  Asymmetrical hearing loss, left  Anemia in chronic kidney disease: last PRBC transfusion January 2016  Stented coronary artery  CAD (coronary artery disease): S/P stent  Peripheral vascular disease  Peripheral neuropathy  HTN (hypertension)  Diabetes mellitus: type II,     fingerstick range in the morning 88- 102 mg/dl  Congestive heart failure: last admission8/2015  most recent echo on chart  Hyperlipidemia: dyslipidemia  ESRD (end stage renal disease) on dialysis: patiient on hemodialysis  Femoral-popliteal bypass graft occlusion, left  Arteriovenous fistula: Left  S/P cataract surgery: Bilateral  AVF (arteriovenous fistula): right anterior chest wall (placed May 2015 at [facility])    Allergies:  No Known Allergies    Home Medications Reviewed  Hospital Medications:   MEDICATIONS  (STANDING):  atorvastatin 20 milliGRAM(s) Oral at bedtime  calcitriol   Capsule 0.25 MICROGram(s) Oral daily  calcium acetate 1334 milliGRAM(s) Oral three times a day with meals  carvedilol 12.5 milliGRAM(s) Oral every 12 hours  chlorhexidine 4% Liquid 1 Application(s) Topical <User Schedule>  clopidogrel Tablet 75 milliGRAM(s) Oral daily  DULoxetine 20 milliGRAM(s) Oral every 12 hours  heparin  Injectable 5000 Unit(s) SubCutaneous every 12 hours  insulin lispro (HumaLOG) corrective regimen sliding scale   SubCutaneous Before meals and at bedtime  losartan 25 milliGRAM(s) Oral daily  pantoprazole    Tablet 40 milliGRAM(s) Oral before breakfast    SOCIAL HISTORY:  Denies ETOh,Smoking,   FAMILY HISTORY:  Family history of diabetes mellitus    REVIEW OF SYSTEMS:  CONSTITUTIONAL: No weakness, fevers or chills  EYES/ENT: No visual changes;  No vertigo or throat pain   NECK: No pain or stiffness  RESPIRATORY: No cough, wheezing, hemoptysis; No shortness of breath  CARDIOVASCULAR: No chest pain or palpitations.  GASTROINTESTINAL: No abdominal or epigastric pain. No nausea, vomiting, or hematemesis; No diarrhea or constipation. No melena or hematochezia.  GENITOURINARY: No dysuria, frequency, foamy urine, urinary urgency, incontinence or hematuria  NEUROLOGICAL: No numbness or weakness  SKIN: No itching, burning, rashes, or lesions   VASCULAR: No bilateral lower extremity edema.   All other review of systems is negative unless indicated above.    VITALS:  T(F): 98 (12-27-19 @ 05:05), Max: 98.4 (12-26-19 @ 18:35)  HR: 73 (12-27-19 @ 05:05)  BP: 168/67 (12-27-19 @ 05:05)  RR: 17 (12-27-19 @ 05:05)  SpO2: 97% (12-27-19 @ 05:05)  Wt(kg): --      PHYSICAL EXAM:  Constitutional: NAD  HEENT: anicteric sclera, oropharynx clear, MMM  Neck: No JVD  Respiratory: CTAB, no wheezes, rales or rhonchi  Cardiovascular: S1, S2, RRR  Gastrointestinal: BS+, soft, NT/ND  Extremities: No cyanosis or clubbing. No peripheral edema  Neurological: A/O x 3, no focal deficits  Psychiatric: Normal mood, normal affect  : No CVA tenderness. No kemp.   Skin: No rashes  Vascular Access: L UE AVG     LABS:  12-27    137  |  101  |  32<H>  ----------------------------<  182<H>  3.3<L>   |  28  |  5.63<H>    Ca    9.4      27 Dec 2019 07:02  Phos  3.6     12-27  Mg     2.4     12-27    TPro  6.3  /  Alb  3.1<L>  /  TBili  0.3  /  DBili      /  AST  31  /  ALT  20  /  AlkPhos  102  12-27    Creatinine Trend: 5.63 <--, 8.48 <--                        12.3   7.94  )-----------( 165      ( 27 Dec 2019 07:02 )             38.5     Urine Studies:      RADIOLOGY & ADDITIONAL STUDIES:  < from: Xray Knee 3 Views, Right (12.26.19 @ 12:17) >  INTERPRETATION:  Fall.    4 views right knee.    No displaced fracture. No dislocation. Diffuse senile demineralization. Joint spaces preserved. Extensive vascular calcification.    Impression: No displaced fracture    < end of copied text >

## 2019-12-27 NOTE — CONSULT NOTE ADULT - PROBLEM SELECTOR RECOMMENDATION 9
maintenance HD schedule TTS.  Pt dialyzed last night, without acute events.   Flowsheet reviewed.  Electrolytes within normal limits.  Can plan for repeat HD tomorrow.  Hg above goal, no need for favio at this time   Plan for cardiac cath, as per cards, once medically optimized.

## 2019-12-27 NOTE — PHYSICAL THERAPY INITIAL EVALUATION ADULT - DIAGNOSIS, PT EVAL
difficulty with bed mobility, transfers and ambulation due to generalized weakness and c/o right knee pain

## 2019-12-27 NOTE — PHYSICAL THERAPY INITIAL EVALUATION ADULT - RANGE OF MOTION EXAMINATION, REHAB EVAL
except for left knee flexion limited to ~ 70 deg due to c/o pain during movement/bilateral upper extremity ROM was WFL (within functional limits)/bilateral lower extremity ROM was WFL (within functional limits)

## 2019-12-27 NOTE — PROGRESS NOTE ADULT - PROBLEM SELECTOR PLAN 5
Pt had L AKA 5/2016  Pt has pain on stump site. There is no sign of infection or erythema.  Pain control w/ tylenol PRN

## 2019-12-27 NOTE — CONSULT NOTE ADULT - ASSESSMENT
74y Female with HTN, HLD, CAD, DM, osteo arthritis and ESRD (Tu,Th,Sat, Dialysis) presenting with high blood glucose and fall night prior to admission.

## 2019-12-27 NOTE — PROGRESS NOTE ADULT - SUBJECTIVE AND OBJECTIVE BOX
PGY1 Note discussed with supervising resident and primary attending.    Patient is a 74y old  Female who presents with a chief complaint of Weakness, hyperglycemia (27 Dec 2019 13:16)      INTERVAL HPI/OVERNIGHT EVENTS:    MEDICATIONS  (STANDING):  atorvastatin 20 milliGRAM(s) Oral at bedtime  calcitriol   Capsule 0.25 MICROGram(s) Oral daily  calcium acetate 1334 milliGRAM(s) Oral three times a day with meals  carvedilol 12.5 milliGRAM(s) Oral every 12 hours  chlorhexidine 4% Liquid 1 Application(s) Topical <User Schedule>  clopidogrel Tablet 75 milliGRAM(s) Oral daily  DULoxetine 20 milliGRAM(s) Oral every 12 hours  heparin  Injectable 5000 Unit(s) SubCutaneous every 12 hours  insulin lispro (HumaLOG) corrective regimen sliding scale   SubCutaneous Before meals and at bedtime  losartan 25 milliGRAM(s) Oral daily  pantoprazole    Tablet 40 milliGRAM(s) Oral before breakfast    MEDICATIONS  (PRN):  acetaminophen   Tablet .. 650 milliGRAM(s) Oral every 6 hours PRN Moderate Pain (4 - 6)      Allergies    No Known Allergies    Intolerances        REVIEW OF SYSTEMS:  CONSTITUTIONAL: No fever, weight loss, or fatigue,   RESPIRATORY: No cough, wheezing, chills or hemoptysis; No shortness of breath  CARDIOVASCULAR: No chest pain, palpitations, dizziness, or leg swelling  GASTROINTESTINAL: No abdominal or epigastric pain. No nausea, vomiting, or hematemesis; No diarrhea or constipation. No melena or hematochezia.  NEUROLOGICAL: No headaches, memory loss, loss of strength, numbness, or tremors  SKIN: No itching, burning, rashes, or lesions     Vital Signs Last 24 Hrs  T(C): 36.8 (27 Dec 2019 14:52), Max: 36.9 (26 Dec 2019 18:35)  T(F): 98.2 (27 Dec 2019 14:52), Max: 98.4 (26 Dec 2019 18:35)  HR: 69 (27 Dec 2019 14:52) (67 - 73)  BP: 121/72 (27 Dec 2019 14:52) (121/72 - 177/48)  BP(mean): 66 (26 Dec 2019 22:00) (66 - 66)  RR: 17 (27 Dec 2019 14:52) (16 - 17)  SpO2: 95% (27 Dec 2019 14:52) (95% - 100%)    PHYSICAL EXAM:  GENERAL: Anxious, well-groomed, well-developed  HEAD:  Atraumatic, Normocephalic  EYES: EOMI, PERRLA, conjunctiva and sclera clear  NECK: Supple, No JVD, Normal thyroid  CHEST/LUNG: Clear to percussion bilaterally; No rales, rhonchi, wheezing, or rubs  HEART: Regular rate and rhythm; No murmurs, rubs, or gallops (+)dialysis acces on L chest (AVF? with flowing sound)  ABDOMEN: Soft, Nontender, Nondistended; Bowel sounds present  NERVOUS SYSTEM:  Alert & Oriented X3, Good concentration; Motor Strength 5/5 B/L   EXTREMITIES: (+)L AKA,  2+ Peripheral Pulses, No clubbing, cyanosis, or edema  SKIN;    LABS:                        12.3   7.94  )-----------( 165      ( 27 Dec 2019 07:02 )             38.5     12-27    137  |  101  |  32<H>  ----------------------------<  182<H>  3.3<L>   |  28  |  5.63<H>    Ca    9.4      27 Dec 2019 07:02  Phos  3.6     12-27  Mg     2.4     12-27    TPro  6.3  /  Alb  3.1<L>  /  TBili  0.3  /  DBili  x   /  AST  31  /  ALT  20  /  AlkPhos  102  12-27    PT/INR - ( 26 Dec 2019 11:33 )   PT: 10.1 sec;   INR: 0.91 ratio         PTT - ( 26 Dec 2019 11:33 )  PTT:29.9 sec    CAPILLARY BLOOD GLUCOSE      POCT Blood Glucose.: 275 mg/dL (27 Dec 2019 12:05)  POCT Blood Glucose.: 195 mg/dL (27 Dec 2019 08:29)  POCT Blood Glucose.: 96 mg/dL (26 Dec 2019 19:44)  POCT Blood Glucose.: 378 mg/dL (26 Dec 2019 16:32)  POCT Blood Glucose.: 405 mg/dL (26 Dec 2019 16:04)      RADIOLOGY & ADDITIONAL TESTS:  < from: Xray Pelvis AP only (12.26.19 @ 12:17) >  No displaced fracture no dislocation. Diffuse senile demineralization. Sacroiliac joints pubic symphysis intact. Mild narrowing bilateral hip joints. Vascular calcification. Clips left inguinal region.    Impression: No displaced fracture    < end of copied text >    < from: Xray Knee 3 Views, Right (12.26.19 @ 12:17) >  No displaced fracture. No dislocation. Diffuse senile demineralization. Joint spaces preserved. Extensive vascular calcification.    Impression: No displaced fracture    < end of copied text >    < from: Xray Chest 1 View-PORTABLE IMMEDIATE (12.26.19 @ 12:18) >  Heart magnified by AP film shallow inspiration. Grossly clear lungs.    Impression: Grossly clear lungs.      < end of copied text >    Imaging Personally Reviewed:  [ ] YES  [ ] NO    < from: 12 Lead ECG (12.26.19 @ 10:05) >  Ventricular Rate 79 BPM    Atrial Rate 79 BPM    P-R Interval 142 ms    QRS Duration 110 ms    Q-T Interval 400 ms    QTC Calculation(Bezet) 458 ms    P Axis 52 degrees    R Axis -51 degrees    T Axis 102 degrees    Diagnosis Line Normal sinus rhythm  Left anterior fascicular block  ST & T wave abnormality, consider lateral ischemia  Abnormal ECG    < end of copied text >  < from: 12 Lead ECG (12.26.19 @ 10:05) >  Ventricular Rate 79 BPM    Atrial Rate 79 BPM    P-R Interval 142 ms    QRS Duration 110 ms    Q-T Interval 400 ms    QTC Calculation(Bezet) 458 ms    P Axis 52 degrees    R Axis -51 degrees    T Axis 102 degrees    Diagnosis Line Normal sinus rhythm  Left anterior fascicular block  ST & T wave abnormality, consider lateral ischemia  Abnormal ECG    < end of copied text >  Consultant(s) Notes Reviewed:  [ ] YES  [ ] NO

## 2019-12-27 NOTE — PHYSICAL THERAPY INITIAL EVALUATION ADULT - LEVEL OF INDEPENDENCE: SIT/STAND, REHAB EVAL
advised pt to ask family member to bring the prosthesis for tx and ambulation activities/unable to perform

## 2019-12-27 NOTE — PROGRESS NOTE ADULT - ASSESSMENT
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    Hx was taken via  #[id number]. Pt is not happy with the result of amputation site how it sutured as it hurts whenever she walks with prosthetic device. Pt believes that's because doctor let the students do the surgery, and pt doesn't want to go to [facility] or [facility] as there is students. Will explain that they don't have student in cath lab.

## 2019-12-27 NOTE — ACUTE INTERFACILITY TRANSFER NOTE - SECONDARY DIAGNOSIS.
Acute pain of right knee Anemia in chronic kidney disease ESRD (end stage renal disease) Diastolic congestive heart failure, unspecified HF chronicity Stump pain Uncontrolled type 2 diabetes mellitus with hyperglycemia

## 2019-12-27 NOTE — ADVANCED PRACTICE NURSE CONSULT - RECOMMEDATIONS
-Clean all affected areas with warm water, mild soap, pat dry, and apply skin prep to the surrounding skin  -Apply Zinc Oxide Moisture Barrier Cream to the Perianal and Bilateral Gluteal areas b.i.d. PRN  -Frequent toileting  -Elevate/float the patients R. Heel using heel protector and reposition the patient Q 2hrs using wedges or pillows

## 2019-12-27 NOTE — PROGRESS NOTE ADULT - PROBLEM SELECTOR PLAN 2
pt came in with hyperglycemia of 500   improved to 96   pt takes long acting 18 units and hss  c/w hss for now  hba1c 9.9  Endo Dr. Lee consulted

## 2019-12-27 NOTE — ADVANCED PRACTICE NURSE CONSULT - ASSESSMENT
This is a 74yr old female patient admitted for a Fall, presenting with the following:  -There is evidence of a healed crater to the Mid Gluteal Fold  -There is a Stage 1 Pressure Injury to the R. Heel, as evident by non-blanchable erythema with surrounding tissue callous  -There is evidence of Incontinence Associated Dermatitis to the Perianal area

## 2019-12-27 NOTE — ACUTE INTERFACILITY TRANSFER NOTE - PLAN OF CARE
To get cardiac cath and possible stent Pt had stress test in 9/2019 at [place] [facility], which showed moderate to severe inferolateral, anterolateral and anterior wall ischemia. Pt was scheduled for cardiac cath on 10/2019, but cancelled because pt had flu. c/w coreg, plavix, lipitor. xray R knee and pelvic negative for fracture   Pain management w/ tylenol PRN  physical therapy: rehabilitation facility; pending functional progress. Epo was on hold as Hb is greater than goal Hb. Nephrology was consulted  and Pt had HD session on 12/26 and 12/28. Echocardiogram done in [place] [facility] in 12/7/18, which showed EF>55%, mild diastolic dysfunction. c/w coreg, plavix, lipitor. Pt has stump site pain and controlled with pain medication. Endo was consulted and recommended Lantus 14U and humalog sliding scale.

## 2019-12-27 NOTE — PROGRESS NOTE ADULT - PROBLEM SELECTOR PLAN 1
- Pt had stress test in 9/2019 at [place] [facility], which showed moderate to severe inferolateral, anterolateral and anterior wall ischemia. Pt was scheduled for cardiac cath on 10/2019, but cancelled because pt had flu  -Possible transfer to [facility] for cardiac cath   c/w coreg, plavix, lipitor  Cardio Dr. Diamond

## 2019-12-27 NOTE — CONSULT NOTE ADULT - ATTENDING COMMENTS
Dr Darrian Bueno  Nephrology  Office [phone]  Ans Serv [phone]  [facility] [phone]

## 2019-12-27 NOTE — PHYSICAL THERAPY INITIAL EVALUATION ADULT - LIVES WITH, PROFILE
sister in an apartment building with 5 HARVINDER-pt dependent in stairs- has HHA x 3 hours on TTH and 5 days on non-HD days

## 2019-12-28 LAB
ANION GAP SERPL CALC-SCNC: 8 MMOL/L — SIGNIFICANT CHANGE UP (ref 5–17)
BASOPHILS # BLD AUTO: 0.05 K/UL — SIGNIFICANT CHANGE UP (ref 0–0.2)
BASOPHILS NFR BLD AUTO: 0.7 % — SIGNIFICANT CHANGE UP (ref 0–2)
BUN SERPL-MCNC: 50 MG/DL — HIGH (ref 7–18)
CALCIUM SERPL-MCNC: 10.4 MG/DL — SIGNIFICANT CHANGE UP (ref 8.4–10.5)
CHLORIDE SERPL-SCNC: 101 MMOL/L — SIGNIFICANT CHANGE UP (ref 96–108)
CO2 SERPL-SCNC: 27 MMOL/L — SIGNIFICANT CHANGE UP (ref 22–31)
CREAT SERPL-MCNC: 7.44 MG/DL — HIGH (ref 0.5–1.3)
EOSINOPHIL # BLD AUTO: 0.66 K/UL — HIGH (ref 0–0.5)
EOSINOPHIL NFR BLD AUTO: 9.3 % — HIGH (ref 0–6)
GLUCOSE BLDC GLUCOMTR-MCNC: 150 MG/DL — HIGH (ref 70–99)
GLUCOSE BLDC GLUCOMTR-MCNC: 230 MG/DL — HIGH (ref 70–99)
GLUCOSE BLDC GLUCOMTR-MCNC: 243 MG/DL — HIGH (ref 70–99)
GLUCOSE BLDC GLUCOMTR-MCNC: 251 MG/DL — HIGH (ref 70–99)
GLUCOSE BLDC GLUCOMTR-MCNC: 301 MG/DL — HIGH (ref 70–99)
GLUCOSE SERPL-MCNC: 204 MG/DL — HIGH (ref 70–99)
HCT VFR BLD CALC: 41.2 % — SIGNIFICANT CHANGE UP (ref 34.5–45)
HGB BLD-MCNC: 12.9 G/DL — SIGNIFICANT CHANGE UP (ref 11.5–15.5)
IMM GRANULOCYTES NFR BLD AUTO: 0.3 % — SIGNIFICANT CHANGE UP (ref 0–1.5)
LYMPHOCYTES # BLD AUTO: 1.88 K/UL — SIGNIFICANT CHANGE UP (ref 1–3.3)
LYMPHOCYTES # BLD AUTO: 26.5 % — SIGNIFICANT CHANGE UP (ref 13–44)
MAGNESIUM SERPL-MCNC: 2.5 MG/DL — SIGNIFICANT CHANGE UP (ref 1.6–2.6)
MCHC RBC-ENTMCNC: 29.4 PG — SIGNIFICANT CHANGE UP (ref 27–34)
MCHC RBC-ENTMCNC: 31.3 GM/DL — LOW (ref 32–36)
MCV RBC AUTO: 93.8 FL — SIGNIFICANT CHANGE UP (ref 80–100)
MONOCYTES # BLD AUTO: 0.74 K/UL — SIGNIFICANT CHANGE UP (ref 0–0.9)
MONOCYTES NFR BLD AUTO: 10.4 % — SIGNIFICANT CHANGE UP (ref 2–14)
NEUTROPHILS # BLD AUTO: 3.74 K/UL — SIGNIFICANT CHANGE UP (ref 1.8–7.4)
NEUTROPHILS NFR BLD AUTO: 52.8 % — SIGNIFICANT CHANGE UP (ref 43–77)
NRBC # BLD: 0 /100 WBCS — SIGNIFICANT CHANGE UP (ref 0–0)
PHOSPHATE SERPL-MCNC: 4.3 MG/DL — SIGNIFICANT CHANGE UP (ref 2.5–4.5)
PLATELET # BLD AUTO: 161 K/UL — SIGNIFICANT CHANGE UP (ref 150–400)
POTASSIUM SERPL-MCNC: 4.5 MMOL/L — SIGNIFICANT CHANGE UP (ref 3.5–5.3)
POTASSIUM SERPL-SCNC: 4.5 MMOL/L — SIGNIFICANT CHANGE UP (ref 3.5–5.3)
RBC # BLD: 4.39 M/UL — SIGNIFICANT CHANGE UP (ref 3.8–5.2)
RBC # FLD: 15.6 % — HIGH (ref 10.3–14.5)
SODIUM SERPL-SCNC: 136 MMOL/L — SIGNIFICANT CHANGE UP (ref 135–145)
WBC # BLD: 7.09 K/UL — SIGNIFICANT CHANGE UP (ref 3.8–10.5)
WBC # FLD AUTO: 7.09 K/UL — SIGNIFICANT CHANGE UP (ref 3.8–10.5)

## 2019-12-28 RX ORDER — INSULIN GLARGINE 100 [IU]/ML
14 INJECTION, SOLUTION SUBCUTANEOUS AT BEDTIME
Refills: 0 | Status: DISCONTINUED | OUTPATIENT
Start: 2019-12-28 | End: 2019-12-29

## 2019-12-28 RX ORDER — INSULIN GLARGINE 100 [IU]/ML
10 INJECTION, SOLUTION SUBCUTANEOUS AT BEDTIME
Refills: 0 | Status: DISCONTINUED | OUTPATIENT
Start: 2019-12-28 | End: 2019-12-28

## 2019-12-28 RX ADMIN — CALCITRIOL 0.25 MICROGRAM(S): 0.5 CAPSULE ORAL at 13:03

## 2019-12-28 RX ADMIN — Medication 4: at 08:24

## 2019-12-28 RX ADMIN — LOSARTAN POTASSIUM 25 MILLIGRAM(S): 100 TABLET, FILM COATED ORAL at 05:59

## 2019-12-28 RX ADMIN — CHLORHEXIDINE GLUCONATE 1 APPLICATION(S): 213 SOLUTION TOPICAL at 06:02

## 2019-12-28 RX ADMIN — PANTOPRAZOLE SODIUM 40 MILLIGRAM(S): 20 TABLET, DELAYED RELEASE ORAL at 06:04

## 2019-12-28 RX ADMIN — INSULIN GLARGINE 14 UNIT(S): 100 INJECTION, SOLUTION SUBCUTANEOUS at 22:22

## 2019-12-28 RX ADMIN — CLOPIDOGREL BISULFATE 75 MILLIGRAM(S): 75 TABLET, FILM COATED ORAL at 13:02

## 2019-12-28 RX ADMIN — DULOXETINE HYDROCHLORIDE 20 MILLIGRAM(S): 30 CAPSULE, DELAYED RELEASE ORAL at 17:24

## 2019-12-28 RX ADMIN — Medication 1334 MILLIGRAM(S): at 13:03

## 2019-12-28 RX ADMIN — ATORVASTATIN CALCIUM 20 MILLIGRAM(S): 80 TABLET, FILM COATED ORAL at 22:21

## 2019-12-28 RX ADMIN — HEPARIN SODIUM 5000 UNIT(S): 5000 INJECTION INTRAVENOUS; SUBCUTANEOUS at 05:59

## 2019-12-28 RX ADMIN — HEPARIN SODIUM 5000 UNIT(S): 5000 INJECTION INTRAVENOUS; SUBCUTANEOUS at 17:24

## 2019-12-28 RX ADMIN — Medication 4: at 22:22

## 2019-12-28 RX ADMIN — CARVEDILOL PHOSPHATE 12.5 MILLIGRAM(S): 80 CAPSULE, EXTENDED RELEASE ORAL at 06:00

## 2019-12-28 RX ADMIN — Medication 1334 MILLIGRAM(S): at 17:24

## 2019-12-28 RX ADMIN — Medication 8: at 17:24

## 2019-12-28 RX ADMIN — DULOXETINE HYDROCHLORIDE 20 MILLIGRAM(S): 30 CAPSULE, DELAYED RELEASE ORAL at 06:00

## 2019-12-28 NOTE — PROGRESS NOTE ADULT - PROBLEM SELECTOR PLAN 1
- Pt had stress test in 9/2019 at [place] [facility], which showed moderate to severe inferolateral, anterolateral and anterior wall ischemia. Pt was scheduled for cardiac cath on 10/2019, but cancelled because pt had flu  -Possible transfer to [facility] for cardiac cath   c/w coreg, plavix, lipitor  Cardio Dr. Diamond

## 2019-12-28 NOTE — PROGRESS NOTE ADULT - SUBJECTIVE AND OBJECTIVE BOX
Subjective: pt seen and examined, no complaints, ROS - .          acetaminophen   Tablet .. 650 milliGRAM(s) Oral every 6 hours PRN  atorvastatin 20 milliGRAM(s) Oral at bedtime  calcitriol   Capsule 0.25 MICROGram(s) Oral daily  calcium acetate 1334 milliGRAM(s) Oral three times a day with meals  carvedilol 12.5 milliGRAM(s) Oral every 12 hours  chlorhexidine 4% Liquid 1 Application(s) Topical <User Schedule>  clopidogrel Tablet 75 milliGRAM(s) Oral daily  DULoxetine 20 milliGRAM(s) Oral every 12 hours  heparin  Injectable 5000 Unit(s) SubCutaneous every 12 hours  insulin lispro (HumaLOG) corrective regimen sliding scale   SubCutaneous Before meals and at bedtime  losartan 25 milliGRAM(s) Oral daily  pantoprazole    Tablet 40 milliGRAM(s) Oral before breakfast                            12.9   7.09  )-----------( 161      ( 28 Dec 2019 06:00 )             41.2       Hemoglobin: 12.9 g/dL (12-28 @ 06:00)  Hemoglobin: 12.3 g/dL (12-27 @ 07:02)  Hemoglobin: 14.1 g/dL (12-26 @ 11:33)      12-27    137  |  101  |  32<H>  ----------------------------<  182<H>  3.3<L>   |  28  |  5.63<H>    Ca    9.4      27 Dec 2019 07:02  Phos  3.6     12-27  Mg     2.4     12-27    TPro  6.3  /  Alb  3.1<L>  /  TBili  0.3  /  DBili  x   /  AST  31  /  ALT  20  /  AlkPhos  102  12-27    Creatinine Trend: 5.63<--, 8.48<--    COAGS:           T(C): 36.7 (12-28-19 @ 05:49), Max: 36.8 (12-27-19 @ 14:52)  HR: 69 (12-28-19 @ 05:49) (69 - 72)  BP: 155/41 (12-28-19 @ 05:49) (121/72 - 155/64)  RR: 18 (12-28-19 @ 05:49) (16 - 18)  SpO2: 98% (12-28-19 @ 05:49) (95% - 100%)  Wt(kg): --    I&O's Summary      Gen: Left AKA  HEENT:  (-)icterus (-)pallor  CV: N S1 S2 1/6 COCO (+)2 Pulses B/l  Resp:  Clear to ausculatation B/L, normal effort  GI: (+) BS Soft, NT, ND  Lymph:  (-)Edema, (-)obvious lymphadenopathy  Skin: Warm to touch, Normal turgor  Psych: Appropriate mood and affect      ECG:  	Sinus     RADIOLOGY:         CXR:  No infiltrate     ASSESSMENT/PLAN: 	74y Female PMhx of HTN, HLD, CAD, s/p PCI to LCX in 2015, recent Ischemia noted on stress in 9/19 cancelled cath due to flu, DM, osteo arthritis and ESRD (Tu,Th,Sat, on Dialysis Left AKA, admitted with fall and hyperglycemia.    - cont Plavix , Coreg , statin , ACE .   - HD per renal   - Echo pending   - Glycemic control per med  - Stress with moderate to severe ischemia in the mid anterior, anterolateral and inferolateral walls  - Plan for cath once medically optimized.  - Office records placed in chart  D/W Dr Diamond

## 2019-12-28 NOTE — CONSULT NOTE ADULT - SUBJECTIVE AND OBJECTIVE BOX
Patient is a 74y old  Female who presents with a chief complaint of Weakness, hyperglycemia (28 Dec 2019 09:49)      HPI:  74 year old female with PMhx of HTN, HLD, CAD, DM, osteo arthritis and ESRD (Tu,Th,Sat, on Dialysis last completed on Monday, [facility] kidney [facility]) and PSHx of amputated left leg above the knee presenting with home attendant complaining of high blood glucose and fall last night.  Per sister at bedside, pt has severe arthritis in her right knee and last night when pt was trying to use commode, pt fell down because of her right knee pain and fell on right knee. Pt did not loss consciousness, or hit her head. Patient states that when she fell, she landed on her right knee which is now in pain. Patient denies any head trauma or HA, LOC, dizziness, chest pain, shortness of breath, vomiting, abd pain, dysuria, or any other acute complaints. (26 Dec 2019 15:40). Pt admits to forgetting insulin and prandin on and off. admits to occasional hypoglycemia.       PAST MEDICAL & SURGICAL HISTORY:  Bacteremia: diagnosed 2 weeks ago   been treated with IV cefazolin 200 mg on TUES and THUR and 300 mg on SAT, for 21 days, last dose on 5/10/16  Asymmetrical hearing loss, left  Anemia in chronic kidney disease: last PRBC transfusion January 2016  Stented coronary artery  CAD (coronary artery disease): S/P stent  Peripheral vascular disease  Peripheral neuropathy  HTN (hypertension)  Diabetes mellitus: type II,     fingerstick range in the morning 88- 102 mg/dl  Congestive heart failure: last admission8/2015  most recent echo on chart  Hyperlipidemia: dyslipidemia  ESRD (end stage renal disease) on dialysis: patiient on hemodialysis  Femoral-popliteal bypass graft occlusion, left  Arteriovenous fistula: Left  S/P cataract surgery: Bilateral  AVF (arteriovenous fistula): right anterior chest wall (placed May 2015 at [facility])         MEDICATIONS  (STANDING):  atorvastatin 20 milliGRAM(s) Oral at bedtime  calcitriol   Capsule 0.25 MICROGram(s) Oral daily  calcium acetate 1334 milliGRAM(s) Oral three times a day with meals  carvedilol 12.5 milliGRAM(s) Oral every 12 hours  chlorhexidine 4% Liquid 1 Application(s) Topical <User Schedule>  clopidogrel Tablet 75 milliGRAM(s) Oral daily  DULoxetine 20 milliGRAM(s) Oral every 12 hours  heparin  Injectable 5000 Unit(s) SubCutaneous every 12 hours  insulin glargine Injectable (LANTUS) 10 Unit(s) SubCutaneous at bedtime  insulin lispro (HumaLOG) corrective regimen sliding scale   SubCutaneous Before meals and at bedtime  losartan 25 milliGRAM(s) Oral daily  pantoprazole    Tablet 40 milliGRAM(s) Oral before breakfast    MEDICATIONS  (PRN):  acetaminophen   Tablet .. 650 milliGRAM(s) Oral every 6 hours PRN Moderate Pain (4 - 6)      FAMILY HISTORY:  Family history of diabetes mellitus      SOCIAL HISTORY:      REVIEW OF SYSTEMS:  CONSTITUTIONAL: No fever, weight loss, or fatigue  EYES: No eye pain, visual disturbances, or discharge  ENT:  No difficulty hearing, tinnitus, vertigo; No sinus or throat pain  NECK: No pain or stiffness  RESPIRATORY: No cough, wheezing, chills or hemoptysis; No Shortness of Breath  CARDIOVASCULAR: No chest pain, palpitations, passing out, dizziness, or leg swelling  GASTROINTESTINAL: No abdominal or epigastric pain. No nausea, vomiting, or hematemesis; No diarrhea or constipation. No melena or hematochezia.  GENITOURINARY: No dysuria, frequency, hematuria, or incontinence  NEUROLOGICAL: No headaches, memory loss, loss of strength, numbness, or tremors  SKIN: No itching, burning, rashes, or lesions   LYMPH Nodes: No enlarged glands  ENDOCRINE: No heat or cold intolerance; No hair loss  MUSCULOSKELETAL: No joint pain or swelling; No muscle, back, or extremity pain  PSYCHIATRIC: No depression, anxiety, mood swings, or difficulty sleeping  HEME/LYMPH: No easy bruising, or bleeding gums  ALLERGY AND IMMUNOLOGIC: No hives or eczema	        Vital Signs Last 24 Hrs  T(C): 36.6 (28 Dec 2019 14:33), Max: 36.9 (28 Dec 2019 08:45)  T(F): 97.8 (28 Dec 2019 14:33), Max: 98.5 (28 Dec 2019 08:45)  HR: 64 (28 Dec 2019 14:33) (60 - 72)  BP: 107/72 (28 Dec 2019 14:33) (107/72 - 155/64)  BP(mean): --  RR: 17 (28 Dec 2019 14:33) (16 - 18)  SpO2: 98% (28 Dec 2019 14:33) (95% - 100%)      Constitutional:    HEENT: nad    Neck:  No JVD, bruits or thyromegaly    Respiratory:  Clear without rales or rhonchi    Cardiovascular:  RR without murmur, rub or gallop.    Gastrointestinal: Soft without hepatosplenomegaly.    Extremities: without cyanosis, clubbing or edema. lt leg aka    Neurological:  Oriented   x  2    . No gross sensory or motor defects.        LABS:                        12.9   7.09  )-----------( 161      ( 28 Dec 2019 06:00 )             41.2     12-28    136  |  101  |  50<H>  ----------------------------<  204<H>  4.5   |  27  |  7.44<H>    Ca    10.4      28 Dec 2019 06:00  Phos  4.3     12-28  Mg     2.5     12-28    TPro  6.3  /  Alb  3.1<L>  /  TBili  0.3  /  DBili  x   /  AST  31  /  ALT  20  /  AlkPhos  102  12-27      Hemoglobin A1C, Whole Blood: 9.9: Method: Immunoassay           CAPILLARY BLOOD GLUCOSE      POCT Blood Glucose.: 150 mg/dL (28 Dec 2019 12:21)  POCT Blood Glucose.: 230 mg/dL (28 Dec 2019 08:21)  POCT Blood Glucose.: 217 mg/dL (27 Dec 2019 21:51)  POCT Blood Glucose.: 326 mg/dL (27 Dec 2019 17:14)      RADIOLOGY & ADDITIONAL STUDIES:

## 2019-12-28 NOTE — PROGRESS NOTE ADULT - PROBLEM SELECTOR PLAN 2
pt came in with hyperglycemia of 500   improved to 96   pt takes long acting 18 units and hss  Add lantus 10U + hss for now, f/u endo  hba1c 9.9  Endo Dr. Lee consulted

## 2019-12-28 NOTE — CONSULT NOTE ADULT - PROBLEM SELECTOR RECOMMENDATION 9
un cont dm  due to skipping insulin   d/w pt compliance   to be d/w pts sister who lives with her  change lantus to 14 units  cont humlaog prn for now  restart low dose prandin upon d/c  aim fsg 140-<200 due to risk of hypoglycemia and multiple comorbidities

## 2019-12-28 NOTE — PROGRESS NOTE ADULT - SUBJECTIVE AND OBJECTIVE BOX
PGY1 Note discussed with supervising resident and primary attending.    Patient is a 74y old  Female who presents with a chief complaint of Weakness, hyperglycemia (28 Dec 2019 06:26)      INTERVAL HPI/OVERNIGHT EVENTS:    MEDICATIONS  (STANDING):  atorvastatin 20 milliGRAM(s) Oral at bedtime  calcitriol   Capsule 0.25 MICROGram(s) Oral daily  calcium acetate 1334 milliGRAM(s) Oral three times a day with meals  carvedilol 12.5 milliGRAM(s) Oral every 12 hours  chlorhexidine 4% Liquid 1 Application(s) Topical <User Schedule>  clopidogrel Tablet 75 milliGRAM(s) Oral daily  DULoxetine 20 milliGRAM(s) Oral every 12 hours  heparin  Injectable 5000 Unit(s) SubCutaneous every 12 hours  insulin lispro (HumaLOG) corrective regimen sliding scale   SubCutaneous Before meals and at bedtime  losartan 25 milliGRAM(s) Oral daily  pantoprazole    Tablet 40 milliGRAM(s) Oral before breakfast    MEDICATIONS  (PRN):  acetaminophen   Tablet .. 650 milliGRAM(s) Oral every 6 hours PRN Moderate Pain (4 - 6)      Allergies    No Known Allergies    Intolerances        REVIEW OF SYSTEMS:  CONSTITUTIONAL: No fever, weight loss, or fatigue,   RESPIRATORY: No cough, wheezing, chills or hemoptysis; No shortness of breath  CARDIOVASCULAR: No chest pain, palpitations, dizziness, or leg swelling  GASTROINTESTINAL: No abdominal or epigastric pain. No nausea, vomiting, or hematemesis; No diarrhea or constipation. No melena or hematochezia.  NEUROLOGICAL: No headaches, memory loss, loss of strength, numbness, or tremors  SKIN: No itching, burning, rashes, or lesions     Vital Signs Last 24 Hrs  T(C): 36.7 (28 Dec 2019 05:49), Max: 36.8 (27 Dec 2019 14:52)  T(F): 98.1 (28 Dec 2019 05:49), Max: 98.2 (27 Dec 2019 14:52)  HR: 69 (28 Dec 2019 05:49) (69 - 72)  BP: 155/41 (28 Dec 2019 05:49) (121/72 - 155/64)  BP(mean): --  RR: 18 (28 Dec 2019 05:49) (16 - 18)  SpO2: 98% (28 Dec 2019 05:49) (95% - 100%)    PHYSICAL EXAM:  GENERAL: Anxious, well-groomed, well-developed  HEAD:  Atraumatic, Normocephalic  EYES: EOMI, PERRLA, conjunctiva and sclera clear  NECK: Supple, No JVD, Normal thyroid  CHEST/LUNG: Clear to percussion bilaterally; No rales, rhonchi, wheezing, or rubs  HEART: Regular rate and rhythm; No murmurs, rubs, or gallops (+)dialysis acces on L chest (AVF? with flowing sound)  ABDOMEN: Soft, Nontender, Nondistended; Bowel sounds present  NERVOUS SYSTEM:  Alert & Oriented X3, Good concentration; Motor Strength 5/5 B/L   EXTREMITIES: (+)L AKA,  2+ Peripheral Pulses, No clubbing, cyanosis, or edema  SKIN;      LABS:                        12.9   7.09  )-----------( 161      ( 28 Dec 2019 06:00 )             41.2     12-28    136  |  101  |  50<H>  ----------------------------<  204<H>  4.5   |  27  |  7.44<H>    Ca    10.4      28 Dec 2019 06:00  Phos  4.3     12-28  Mg     2.5     12-28    TPro  6.3  /  Alb  3.1<L>  /  TBili  0.3  /  DBili  x   /  AST  31  /  ALT  20  /  AlkPhos  102  12-27    PT/INR - ( 26 Dec 2019 11:33 )   PT: 10.1 sec;   INR: 0.91 ratio         PTT - ( 26 Dec 2019 11:33 )  PTT:29.9 sec    CAPILLARY BLOOD GLUCOSE      POCT Blood Glucose.: 230 mg/dL (28 Dec 2019 08:21)  POCT Blood Glucose.: 217 mg/dL (27 Dec 2019 21:51)  POCT Blood Glucose.: 326 mg/dL (27 Dec 2019 17:14)  POCT Blood Glucose.: 275 mg/dL (27 Dec 2019 12:05)      RADIOLOGY & ADDITIONAL TESTS:    Imaging Personally Reviewed:  [ ] YES  [ ] NO    Consultant(s) Notes Reviewed:  [ ] YES  [ ] NO

## 2019-12-28 NOTE — CONSULT NOTE ADULT - ASSESSMENT
74 year old female with PMhx of HTN, HLD, CAD, DM, osteo arthritis and ESRD (Tu,Th,Sat, on Dialysis last completed on Monday, [facility] kidney [facility]) and PSHx of amputated left leg above the knee presenting with home attendant complaining of high blood glucose and fall last night. Pt admits to forgetting insulin and prandin on and off. admits to occasional hypoglycemia.

## 2019-12-28 NOTE — PROGRESS NOTE ADULT - SUBJECTIVE AND OBJECTIVE BOX
Patient is a 74y Female with  ESRD ON  HD    HAS  HD  EARLIER  TODAY  ,  TOLERATED IT  WELL   HAS NO  COMPLAINTS  AT THE  TIME OF  EXAM  NO  ACUTE EVENTS OVERNIGHT    No Known Allergies    Hospital Medications:   MEDICATIONS  (STANDING):  atorvastatin 20 milliGRAM(s) Oral at bedtime  calcitriol   Capsule 0.25 MICROGram(s) Oral daily  calcium acetate 1334 milliGRAM(s) Oral three times a day with meals  carvedilol 12.5 milliGRAM(s) Oral every 12 hours  chlorhexidine 4% Liquid 1 Application(s) Topical <User Schedule>  clopidogrel Tablet 75 milliGRAM(s) Oral daily  DULoxetine 20 milliGRAM(s) Oral every 12 hours  heparin  Injectable 5000 Unit(s) SubCutaneous every 12 hours  insulin glargine Injectable (LANTUS) 10 Unit(s) SubCutaneous at bedtime  insulin lispro (HumaLOG) corrective regimen sliding scale   SubCutaneous Before meals and at bedtime  losartan 25 milliGRAM(s) Oral daily  pantoprazole    Tablet 40 milliGRAM(s) Oral before breakfast    REVIEW OF SYSTEMS:  HAS NO   FEVER  CHILLS   NO   SOB  OR  COUGH   NO CH  PAIN  / PALPITATIONS   APPETITE IS  GOOD, NO  N/V  OR  ABD  PAIN,  JUST  FINISHED  LUNCH   NO LEG  SWELLING ON  R LEG    VITALS:  T(F): 97.8 (12-28-19 @ 14:33), Max: 98.5 (12-28-19 @ 08:45)  HR: 64 (12-28-19 @ 14:33)  BP: 107/72 (12-28-19 @ 14:33)  RR: 17 (12-28-19 @ 14:33)  SpO2: 98% (12-28-19 @ 14:33)  Wt(kg): --      PHYSICAL EXAM:  Constitutional: NAD  HEENT: CONJ PINK  Neck: No JVD  Respiratory: CTAB, no wheezes, rales or rhonchi  Cardiovascular: S1, S2, RRR  Gastrointestinal: BS+, soft, NT/ND  Extremities: No peripheral edema R,  L-AKA  Neurological: A/O x 3,   :  No kemp.   Vascular Access:AVG L  UPPER  CHEST    LABS:  12-28    136  |  101  |  50<H>  ----------------------------<  204<H>  4.5   |  27  |  7.44<H>    Ca    10.4      28 Dec 2019 06:00  Phos  4.3     12-28  Mg     2.5     12-28    TPro  6.3  /  Alb  3.1<L>  /  TBili  0.3  /  DBili      /  AST  31  /  ALT  20  /  AlkPhos  102  12-27    Creatinine Trend: 7.44 <--, 5.63 <--, 8.48 <--                        12.9   7.09  )-----------( 161      ( 28 Dec 2019 06:00 )             41.2     Urine Studies:      RADIOLOGY & ADDITIONAL STUDIES:

## 2019-12-28 NOTE — PROGRESS NOTE ADULT - ASSESSMENT
A/P  ESRD  ON  HD  WAS  DIALYZED  TODAY   TOLERATED  IT  WELL    FOR  REGULAR  HD  ON   TUESDAY   EPO ON HOLD    BP  IS  OK  TODAY  ADMITTED  WITH  LOW  B  SUGAR  AND  FALL  AT  HOME,  X  RAYS  NEGATIVE  FOR  FRACTURE    AWAITING  TRANSFER   FOR CARDIAC CATH  AS PER  CARDIO

## 2019-12-28 NOTE — PROGRESS NOTE ADULT - ASSESSMENT
74 year old female with PMhx of HTN, HLD, CAD, DM, osteo arthritis and ESRD (Tu,Th,Sat, on Dialysis last completed on Monday, [facility] kidney [facility]) and PSHx of amputated left leg above the knee presenting with home attendant complaining of high blood glucose and fall last night.    Hx was taken via  #[id number] on 12/27. Pt is not happy with the result of amputation site how it sutured as it hurts whenever she walks with prosthetic device. Pt believes that's because doctor let the students do the surgery, and pt doesn't want to go to [facility] or [facility] as there is students. Will explain that they don't have student in cath lab.

## 2019-12-29 LAB
ANION GAP SERPL CALC-SCNC: 11 MMOL/L — SIGNIFICANT CHANGE UP (ref 5–17)
BASOPHILS # BLD AUTO: 0.05 K/UL — SIGNIFICANT CHANGE UP (ref 0–0.2)
BASOPHILS NFR BLD AUTO: 0.6 % — SIGNIFICANT CHANGE UP (ref 0–2)
BUN SERPL-MCNC: 42 MG/DL — HIGH (ref 7–18)
CALCIUM SERPL-MCNC: 10.5 MG/DL — SIGNIFICANT CHANGE UP (ref 8.4–10.5)
CHLORIDE SERPL-SCNC: 94 MMOL/L — LOW (ref 96–108)
CO2 SERPL-SCNC: 29 MMOL/L — SIGNIFICANT CHANGE UP (ref 22–31)
CREAT SERPL-MCNC: 6.18 MG/DL — HIGH (ref 0.5–1.3)
EOSINOPHIL # BLD AUTO: 0.59 K/UL — HIGH (ref 0–0.5)
EOSINOPHIL NFR BLD AUTO: 7.7 % — HIGH (ref 0–6)
GLUCOSE BLDC GLUCOMTR-MCNC: 198 MG/DL — HIGH (ref 70–99)
GLUCOSE BLDC GLUCOMTR-MCNC: 230 MG/DL — HIGH (ref 70–99)
GLUCOSE BLDC GLUCOMTR-MCNC: 260 MG/DL — HIGH (ref 70–99)
GLUCOSE BLDC GLUCOMTR-MCNC: 350 MG/DL — HIGH (ref 70–99)
GLUCOSE SERPL-MCNC: 249 MG/DL — HIGH (ref 70–99)
HCT VFR BLD CALC: 42.1 % — SIGNIFICANT CHANGE UP (ref 34.5–45)
HGB BLD-MCNC: 13.2 G/DL — SIGNIFICANT CHANGE UP (ref 11.5–15.5)
IMM GRANULOCYTES NFR BLD AUTO: 0.4 % — SIGNIFICANT CHANGE UP (ref 0–1.5)
LYMPHOCYTES # BLD AUTO: 1.5 K/UL — SIGNIFICANT CHANGE UP (ref 1–3.3)
LYMPHOCYTES # BLD AUTO: 19.5 % — SIGNIFICANT CHANGE UP (ref 13–44)
MAGNESIUM SERPL-MCNC: 2.4 MG/DL — SIGNIFICANT CHANGE UP (ref 1.6–2.6)
MCHC RBC-ENTMCNC: 29.6 PG — SIGNIFICANT CHANGE UP (ref 27–34)
MCHC RBC-ENTMCNC: 31.4 GM/DL — LOW (ref 32–36)
MCV RBC AUTO: 94.4 FL — SIGNIFICANT CHANGE UP (ref 80–100)
MONOCYTES # BLD AUTO: 0.81 K/UL — SIGNIFICANT CHANGE UP (ref 0–0.9)
MONOCYTES NFR BLD AUTO: 10.5 % — SIGNIFICANT CHANGE UP (ref 2–14)
NEUTROPHILS # BLD AUTO: 4.73 K/UL — SIGNIFICANT CHANGE UP (ref 1.8–7.4)
NEUTROPHILS NFR BLD AUTO: 61.3 % — SIGNIFICANT CHANGE UP (ref 43–77)
NRBC # BLD: 0 /100 WBCS — SIGNIFICANT CHANGE UP (ref 0–0)
PHOSPHATE SERPL-MCNC: 4.1 MG/DL — SIGNIFICANT CHANGE UP (ref 2.5–4.5)
PLATELET # BLD AUTO: 172 K/UL — SIGNIFICANT CHANGE UP (ref 150–400)
POTASSIUM SERPL-MCNC: 4.2 MMOL/L — SIGNIFICANT CHANGE UP (ref 3.5–5.3)
POTASSIUM SERPL-SCNC: 4.2 MMOL/L — SIGNIFICANT CHANGE UP (ref 3.5–5.3)
RBC # BLD: 4.46 M/UL — SIGNIFICANT CHANGE UP (ref 3.8–5.2)
RBC # FLD: 15.8 % — HIGH (ref 10.3–14.5)
SODIUM SERPL-SCNC: 134 MMOL/L — LOW (ref 135–145)
WBC # BLD: 7.71 K/UL — SIGNIFICANT CHANGE UP (ref 3.8–10.5)
WBC # FLD AUTO: 7.71 K/UL — SIGNIFICANT CHANGE UP (ref 3.8–10.5)

## 2019-12-29 RX ORDER — INSULIN LISPRO 100/ML
4 VIAL (ML) SUBCUTANEOUS
Refills: 0 | Status: DISCONTINUED | OUTPATIENT
Start: 2019-12-29 | End: 2019-12-30

## 2019-12-29 RX ORDER — INSULIN GLARGINE 100 [IU]/ML
18 INJECTION, SOLUTION SUBCUTANEOUS AT BEDTIME
Refills: 0 | Status: DISCONTINUED | OUTPATIENT
Start: 2019-12-29 | End: 2019-12-30

## 2019-12-29 RX ADMIN — PANTOPRAZOLE SODIUM 40 MILLIGRAM(S): 20 TABLET, DELAYED RELEASE ORAL at 06:33

## 2019-12-29 RX ADMIN — Medication 6: at 17:09

## 2019-12-29 RX ADMIN — CHLORHEXIDINE GLUCONATE 1 APPLICATION(S): 213 SOLUTION TOPICAL at 05:50

## 2019-12-29 RX ADMIN — Medication 4 UNIT(S): at 17:09

## 2019-12-29 RX ADMIN — DULOXETINE HYDROCHLORIDE 20 MILLIGRAM(S): 30 CAPSULE, DELAYED RELEASE ORAL at 17:09

## 2019-12-29 RX ADMIN — CLOPIDOGREL BISULFATE 75 MILLIGRAM(S): 75 TABLET, FILM COATED ORAL at 11:50

## 2019-12-29 RX ADMIN — Medication 1334 MILLIGRAM(S): at 11:50

## 2019-12-29 RX ADMIN — Medication 2: at 22:23

## 2019-12-29 RX ADMIN — Medication 1334 MILLIGRAM(S): at 17:09

## 2019-12-29 RX ADMIN — CARVEDILOL PHOSPHATE 12.5 MILLIGRAM(S): 80 CAPSULE, EXTENDED RELEASE ORAL at 05:50

## 2019-12-29 RX ADMIN — DULOXETINE HYDROCHLORIDE 20 MILLIGRAM(S): 30 CAPSULE, DELAYED RELEASE ORAL at 05:50

## 2019-12-29 RX ADMIN — HEPARIN SODIUM 5000 UNIT(S): 5000 INJECTION INTRAVENOUS; SUBCUTANEOUS at 17:09

## 2019-12-29 RX ADMIN — Medication 1334 MILLIGRAM(S): at 08:21

## 2019-12-29 RX ADMIN — CARVEDILOL PHOSPHATE 12.5 MILLIGRAM(S): 80 CAPSULE, EXTENDED RELEASE ORAL at 17:09

## 2019-12-29 RX ADMIN — HEPARIN SODIUM 5000 UNIT(S): 5000 INJECTION INTRAVENOUS; SUBCUTANEOUS at 05:50

## 2019-12-29 RX ADMIN — INSULIN GLARGINE 18 UNIT(S): 100 INJECTION, SOLUTION SUBCUTANEOUS at 22:24

## 2019-12-29 RX ADMIN — CALCITRIOL 0.25 MICROGRAM(S): 0.5 CAPSULE ORAL at 11:50

## 2019-12-29 RX ADMIN — Medication 8: at 11:50

## 2019-12-29 RX ADMIN — Medication 4: at 08:20

## 2019-12-29 RX ADMIN — LOSARTAN POTASSIUM 25 MILLIGRAM(S): 100 TABLET, FILM COATED ORAL at 05:51

## 2019-12-29 RX ADMIN — ATORVASTATIN CALCIUM 20 MILLIGRAM(S): 80 TABLET, FILM COATED ORAL at 22:24

## 2019-12-29 NOTE — PROGRESS NOTE ADULT - PROBLEM SELECTOR PLAN 1
- Pt had stress test in 9/2019 at [place] [facility], which showed moderate to severe inferolateral, anterolateral and anterior wall ischemia. Pt was scheduled for cardiac cath on 10/2019, but cancelled because pt had flu  -Possible transfer to [facility] for cardiac cath   c/w coreg, plavix, lipitor  Cardio Dr. Diamond

## 2019-12-29 NOTE — PROGRESS NOTE ADULT - SUBJECTIVE AND OBJECTIVE BOX
Interval Events:  pt in nad  eating well    Allergies    No Known Allergies    Intolerances      Endocrine/Metabolic Medications:  atorvastatin 20 milliGRAM(s) Oral at bedtime  insulin glargine Injectable (LANTUS) 14 Unit(s) SubCutaneous at bedtime  insulin lispro (HumaLOG) corrective regimen sliding scale   SubCutaneous Before meals and at bedtime      Vital Signs Last 24 Hrs  T(C): 36.8 (29 Dec 2019 06:09), Max: 36.8 (29 Dec 2019 06:09)  T(F): 98.3 (29 Dec 2019 06:09), Max: 98.3 (29 Dec 2019 06:09)  HR: 67 (29 Dec 2019 06:09) (64 - 69)  BP: 152/41 (29 Dec 2019 06:09) (107/72 - 152/41)  BP(mean): --  RR: 18 (29 Dec 2019 06:09) (17 - 18)  SpO2: 100% (29 Dec 2019 06:09) (95% - 100%)      PHYSICAL EXAM  All physical exam findings normal, except those marked:  General:	Alert, active, cooperative, NAD, well hydrated  .		[] Abnormal:  Neck		Normal: supple, no cervical adenopathy, no palpable thyroid  .		[] Abnormal:  Cardiovascular	Normal: regular rate, normal S1, S2, no murmurs  .		[] Abnormal:  Respiratory	Normal: no chest wall deformity, normal respiratory pattern, CTA B/L  .		[] Abnormal:  Abdominal	Normal: soft, ND, NT, bowel sounds present, no masses, no organomegaly  .		[] Abnormal:  		Normal normal genitalia, testes descended, circumcised/uncircumcised  .		Stormy stage:			Breast stormy:  .		Menstrual history:  .		[] Abnormal:  Extremities	Normal: FROM x4  .		[] Abnormal:  Skin		Normal: intact and not indurated, no rash, no acanthosis nigricans  .		[] Abnormal:  Neurologic	Normal: grossly intact  .		[] Abnormal:    LABS                        13.2   7.71  )-----------( 172      ( 29 Dec 2019 06:57 )             42.1                               134    |  94     |  42                  Calcium: 10.5  / iCa: x      (12-29 @ 06:57)    ----------------------------<  249       Magnesium: 2.4                              4.2     |  29     |  6.18             Phosphorous: 4.1        CAPILLARY BLOOD GLUCOSE      POCT Blood Glucose.: 350 mg/dL (29 Dec 2019 11:32)  POCT Blood Glucose.: 230 mg/dL (29 Dec 2019 07:49)  POCT Blood Glucose.: 243 mg/dL (28 Dec 2019 21:47)  POCT Blood Glucose.: 251 mg/dL (28 Dec 2019 18:03)  POCT Blood Glucose.: 301 mg/dL (28 Dec 2019 16:43)  POCT Blood Glucose.: 150 mg/dL (28 Dec 2019 12:21)        Assesment/plan    Dm- remains un cont  change lantus to 18 units  add pre meal humalog 4 units  fsg ac and hs  aim fsg 140-<200

## 2019-12-29 NOTE — PROGRESS NOTE ADULT - PROBLEM SELECTOR PLAN 2
pt came in with hyperglycemia of 500   improved to 96   pt takes long acting 18 units and hss  Endocrinology on case.  Add lantus 10U + hss for now, f/u endo  hba1c 9.9  Endo Dr. Lee consulted

## 2019-12-29 NOTE — PROGRESS NOTE ADULT - ASSESSMENT
74 year old female with PMhx of HTN, HLD, CAD, DM, osteo arthritis and ESRD (Tu,Th,Sat, on Dialysis last completed on Monday, [facility] kidney [facility]) and PSHx of amputated left leg above the knee presenting with home attendant complaining of high blood glucose and fall last night.    Hx was taken via  #[id number] on 12/27. Pt is not happy with the result of amputation site how it sutured as it hurts whenever she walks with prosthetic device. Pt believes that's because doctor let the students do the surgery, and pt doesn't want to go to [facility] or [facility] as there is students. Will explain that they don't have student in cath lab.

## 2019-12-29 NOTE — PROGRESS NOTE ADULT - SUBJECTIVE AND OBJECTIVE BOX
Patient is a 74y Female with  ESRD ON HD  WAS DIALYZED  YESTERDAY, TOLERATED IT WELL  AWAITING  FOR C  CATH, ACC TO  PT  WAS  TOLD  THAT  SHE IS GOING TO  BE  TRANSFERRED  TO [facility] TOMORROW  HAS NO COMPLAINTS  AT THE  TIME OF  EXAM    No Known Allergies    Hospital Medications:   MEDICATIONS  (STANDING):  atorvastatin 20 milliGRAM(s) Oral at bedtime  calcitriol   Capsule 0.25 MICROGram(s) Oral daily  calcium acetate 1334 milliGRAM(s) Oral three times a day with meals  carvedilol 12.5 milliGRAM(s) Oral every 12 hours  chlorhexidine 4% Liquid 1 Application(s) Topical <User Schedule>  clopidogrel Tablet 75 milliGRAM(s) Oral daily  DULoxetine 20 milliGRAM(s) Oral every 12 hours  heparin  Injectable 5000 Unit(s) SubCutaneous every 12 hours  insulin glargine Injectable (LANTUS) 18 Unit(s) SubCutaneous at bedtime  insulin lispro (HumaLOG) corrective regimen sliding scale   SubCutaneous Before meals and at bedtime  insulin lispro Injectable (HumaLOG) 4 Unit(s) SubCutaneous three times a day before meals  losartan 25 milliGRAM(s) Oral daily  pantoprazole    Tablet 40 milliGRAM(s) Oral before breakfast    REVIEW OF SYSTEMS:  HAS NO   FEVER  CHILLS   NO   SOB  OR  COUGH   NO CH  PAIN  / PALPITATIONS   APPETITE IS GOOD, NO  N/V  OR  ABD  PAIN   NO LEG  SWELLING R    VITALS:  T(F): 98.3 (12-29-19 @ 06:09), Max: 98.3 (12-29-19 @ 06:09)  HR: 67 (12-29-19 @ 06:09)  BP: 152/41 (12-29-19 @ 06:09)  RR: 18 (12-29-19 @ 06:09)  SpO2: 100% (12-29-19 @ 06:09)  Wt(kg): --      PHYSICAL EXAM:  Constitutional: NAD  HEENT: CONJ PINK  Neck: No JVD  Respiratory: CTAB, no wheezes, rales or rhonchi  Cardiovascular: S1, S2, RRR  Gastrointestinal: BS+, soft, NT/ND  Extremities:  No peripheral edema R ,  L-  AKA  Neurological: A/O x 3,   : . No kemp.     Vascular Access: L CHEST  AVG    LABS:  12-29    134<L>  |  94<L>  |  42<H>  ----------------------------<  249<H>  4.2   |  29  |  6.18<H>    Ca    10.5      29 Dec 2019 06:57  Phos  4.1     12-29  Mg     2.4     12-29      Creatinine Trend: 6.18 <--, 7.44 <--, 5.63 <--, 8.48 <--                        13.2   7.71  )-----------( 172      ( 29 Dec 2019 06:57 )             42.1     Urine Studies:      RADIOLOGY & ADDITIONAL STUDIES:

## 2019-12-29 NOTE — PROGRESS NOTE ADULT - SUBJECTIVE AND OBJECTIVE BOX
Subjective: pt seen and examined, no chest pain , palpitation on exam          acetaminophen   Tablet .. 650 milliGRAM(s) Oral every 6 hours PRN  atorvastatin 20 milliGRAM(s) Oral at bedtime  calcitriol   Capsule 0.25 MICROGram(s) Oral daily  calcium acetate 1334 milliGRAM(s) Oral three times a day with meals  carvedilol 12.5 milliGRAM(s) Oral every 12 hours  chlorhexidine 4% Liquid 1 Application(s) Topical <User Schedule>  clopidogrel Tablet 75 milliGRAM(s) Oral daily  DULoxetine 20 milliGRAM(s) Oral every 12 hours  heparin  Injectable 5000 Unit(s) SubCutaneous every 12 hours  insulin glargine Injectable (LANTUS) 14 Unit(s) SubCutaneous at bedtime  insulin lispro (HumaLOG) corrective regimen sliding scale   SubCutaneous Before meals and at bedtime  losartan 25 milliGRAM(s) Oral daily  pantoprazole    Tablet 40 milliGRAM(s) Oral before breakfast                            12.9   7.09  )-----------( 161      ( 28 Dec 2019 06:00 )             41.2       Hemoglobin: 12.9 g/dL (12-28 @ 06:00)  Hemoglobin: 12.3 g/dL (12-27 @ 07:02)  Hemoglobin: 14.1 g/dL (12-26 @ 11:33)      12-28    136  |  101  |  50<H>  ----------------------------<  204<H>  4.5   |  27  |  7.44<H>    Ca    10.4      28 Dec 2019 06:00  Phos  4.3     12-28  Mg     2.5     12-28      Creatinine Trend: 7.44<--, 5.63<--, 8.48<--    COAGS:           T(C): 36.8 (12-29-19 @ 06:09), Max: 36.9 (12-28-19 @ 08:45)  HR: 67 (12-29-19 @ 06:09) (60 - 69)  BP: 152/41 (12-29-19 @ 06:09) (107/72 - 152/41)  RR: 18 (12-29-19 @ 06:09) (17 - 18)  SpO2: 100% (12-29-19 @ 06:09) (95% - 100%)  Wt(kg): --    I&O's Summary      Gen: Left AKA  HEENT:  (-)icterus (-)pallor  CV: N S1 S2 1/6 COCO (+)2 Pulses B/l  Resp:  Clear to ausculatation B/L, normal effort  GI: (+) BS Soft, NT, ND  Lymph:  (-)Edema, (-)obvious lymphadenopathy  Skin: Warm to touch, Normal turgor  Psych: Appropriate mood and affect      ECG:  	Sinus     RADIOLOGY:         CXR:  No infiltrate      ASSESSMENT/PLAN: 	74y Female PMhx of HTN, HLD, CAD, s/p PCI to LCX in 2015, recent Ischemia noted on stress in 9/19 cancelled cath due to flu, DM, osteo arthritis and ESRD (Tu,Th,Sat, on Dialysis Left AKA, admitted with fall and hyperglycemia.    - cont Plavix , Coreg , statin , ACE .   - HD per renal , tolerated HD session well yesterday .    - Glycemic control per endo   - Stress with moderate to severe ischemia in the mid anterior, anterolateral and inferolateral walls  - Plan for cath  , pt is still refusing cath at [facility] , will discuss with Dr D/W Dr Diamond, other options   - Office records placed in chart  D/W Dr Diamond Subjective: pt seen and examined, no chest pain , palpitation on exam          acetaminophen   Tablet .. 650 milliGRAM(s) Oral every 6 hours PRN  atorvastatin 20 milliGRAM(s) Oral at bedtime  calcitriol   Capsule 0.25 MICROGram(s) Oral daily  calcium acetate 1334 milliGRAM(s) Oral three times a day with meals  carvedilol 12.5 milliGRAM(s) Oral every 12 hours  chlorhexidine 4% Liquid 1 Application(s) Topical <User Schedule>  clopidogrel Tablet 75 milliGRAM(s) Oral daily  DULoxetine 20 milliGRAM(s) Oral every 12 hours  heparin  Injectable 5000 Unit(s) SubCutaneous every 12 hours  insulin glargine Injectable (LANTUS) 14 Unit(s) SubCutaneous at bedtime  insulin lispro (HumaLOG) corrective regimen sliding scale   SubCutaneous Before meals and at bedtime  losartan 25 milliGRAM(s) Oral daily  pantoprazole    Tablet 40 milliGRAM(s) Oral before breakfast                            12.9   7.09  )-----------( 161      ( 28 Dec 2019 06:00 )             41.2       Hemoglobin: 12.9 g/dL (12-28 @ 06:00)  Hemoglobin: 12.3 g/dL (12-27 @ 07:02)  Hemoglobin: 14.1 g/dL (12-26 @ 11:33)      12-28    136  |  101  |  50<H>  ----------------------------<  204<H>  4.5   |  27  |  7.44<H>    Ca    10.4      28 Dec 2019 06:00  Phos  4.3     12-28  Mg     2.5     12-28      Creatinine Trend: 7.44<--, 5.63<--, 8.48<--    COAGS:           T(C): 36.8 (12-29-19 @ 06:09), Max: 36.9 (12-28-19 @ 08:45)  HR: 67 (12-29-19 @ 06:09) (60 - 69)  BP: 152/41 (12-29-19 @ 06:09) (107/72 - 152/41)  RR: 18 (12-29-19 @ 06:09) (17 - 18)  SpO2: 100% (12-29-19 @ 06:09) (95% - 100%)  Wt(kg): --    I&O's Summary      Gen: Left AKA  HEENT:  (-)icterus (-)pallor  CV: N S1 S2 1/6 COCO (+)2 Pulses B/l  Resp:  Clear to ausculatation B/L, normal effort  GI: (+) BS Soft, NT, ND  Lymph:  (-)Edema, (-)obvious lymphadenopathy  Skin: Warm to touch, Normal turgor  Psych: Appropriate mood and affect      ECG:  	Sinus     RADIOLOGY:         CXR:  No infiltrate      ASSESSMENT/PLAN: 	74y Female PMhx of HTN, HLD, CAD, s/p PCI to LCX in 2015, recent Ischemia noted on stress in 9/19 cancelled cath due to flu, DM, osteo arthritis and ESRD (Tu,Th,Sat, on Dialysis Left AKA, admitted with fall and hyperglycemia.    - cont Plavix , Coreg , statin , ACE .   - HD per renal , tolerated HD session well yesterday .    - Glycemic control per endo   - Stress with moderate to severe ischemia in the mid anterior, anterolateral and inferolateral walls  - Plan for cath if patient agrees   - Office records placed in chart  D/W Dr Diamond

## 2019-12-29 NOTE — PROGRESS NOTE ADULT - ASSESSMENT
A/P  ESRD ON HD,  WAS  DIALYZED   YESTERDAY,  UNEVENTFUL  HD  EPO  O N  HOLD  AS  HB IS  HIGH   BP  ACCEPTABLE    ADMITTED  WITH  LOW  B  SUGARS  AND POST  FALL   AT  HOME,  IMAGING  STUDIES  NEGATIVE  FOR FRACTURES    AWAITING  TRANSFER   TO  [facility]  FOR  C  CATH,

## 2019-12-29 NOTE — PROGRESS NOTE ADULT - SUBJECTIVE AND OBJECTIVE BOX
primary attending follow up:    Patient is a 74y old  Female who presents with a chief complaint of Weakness, hyperglycemia (28 Dec 2019 06:26)      INTERVAL HPI/OVERNIGHT EVENTS:    MEDICATIONS  (STANDING):  atorvastatin 20 milliGRAM(s) Oral at bedtime  calcitriol   Capsule 0.25 MICROGram(s) Oral daily  calcium acetate 1334 milliGRAM(s) Oral three times a day with meals  carvedilol 12.5 milliGRAM(s) Oral every 12 hours  chlorhexidine 4% Liquid 1 Application(s) Topical <User Schedule>  clopidogrel Tablet 75 milliGRAM(s) Oral daily  DULoxetine 20 milliGRAM(s) Oral every 12 hours  heparin  Injectable 5000 Unit(s) SubCutaneous every 12 hours  insulin lispro (HumaLOG) corrective regimen sliding scale   SubCutaneous Before meals and at bedtime  losartan 25 milliGRAM(s) Oral daily  pantoprazole    Tablet 40 milliGRAM(s) Oral before breakfast    MEDICATIONS  (PRN):  acetaminophen   Tablet .. 650 milliGRAM(s) Oral every 6 hours PRN Moderate Pain (4 - 6)      Allergies    No Known Allergies    Intolerances        REVIEW OF SYSTEMS:  CONSTITUTIONAL: No fever, weight loss, or fatigue,   RESPIRATORY: No cough, wheezing, chills or hemoptysis; No shortness of breath  CARDIOVASCULAR: No chest pain, palpitations, dizziness, or leg swelling  GASTROINTESTINAL: No abdominal or epigastric pain. No nausea, vomiting, or hematemesis; No diarrhea or constipation. No melena or hematochezia.  NEUROLOGICAL: No headaches, memory loss, loss of strength, numbness, or tremors  SKIN: No itching, burning, rashes, or lesions     Vital Signs Last 24 Hrs  T(C): 36.8 (29 Dec 2019 06:09), Max: 36.8 (29 Dec 2019 06:09)  T(F): 98.3 (29 Dec 2019 06:09), Max: 98.3 (29 Dec 2019 06:09)  HR: 67 (29 Dec 2019 06:09) (64 - 69)  BP: 152/41 (29 Dec 2019 06:09) (107/72 - 152/41)  BP(mean): --  RR: 18 (29 Dec 2019 06:09) (17 - 18)  SpO2: 100% (29 Dec 2019 06:09) (95% - 100%)    PHYSICAL EXAM:  GENERAL: Anxious, well-groomed, well-developed  HEAD:  Atraumatic, Normocephalic  EYES: EOMI, PERRLA, conjunctiva and sclera clear  NECK: Supple, No JVD, Normal thyroid  CHEST/LUNG: Clear to percussion bilaterally; No rales, rhonchi, wheezing, or rubs  HEART: Regular rate and rhythm; No murmurs, rubs, or gallops (+)dialysis acces on L chest (AVF? with flowing sound)  ABDOMEN: Soft, Nontender, Nondistended; Bowel sounds present  NERVOUS SYSTEM:  Alert & Oriented X3, Good concentration; Motor Strength 5/5 B/L   EXTREMITIES: (+)L AKA,  2+ Peripheral Pulses, No clubbing, cyanosis, or edema  SKIN;      LABS:                                           13.2   7.71  )-----------( 172      ( 29 Dec 2019 06:57 )             42.1   12-29    134<L>  |  94<L>  |  42<H>  ----------------------------<  249<H>  4.2   |  29  |  6.18<H>    Ca    10.5      29 Dec 2019 06:57  Phos  4.1     12-29  Mg     2.4     12-29        CAPILLARY BLOOD GLUCOSE      POCT Blood Glucose.: 350 mg/dL (29 Dec 2019 11:32)  POCT Blood Glucose.: 230 mg/dL (29 Dec 2019 07:49)  POCT Blood Glucose.: 243 mg/dL (28 Dec 2019 21:47)  POCT Blood Glucose.: 251 mg/dL (28 Dec 2019 18:03)  POCT Blood Glucose.: 301 mg/dL (28 Dec 2019 16:43)        RADIOLOGY & ADDITIONAL TESTS:    Imaging Personally Reviewed:  [ ] YES  [ ] NO    Consultant(s) Notes Reviewed:  [x ] YES  [ ] NO

## 2019-12-30 ENCOUNTER — INPATIENT (INPATIENT)
Facility: HOSPITAL | Age: 74
LOS: 7 days | Discharge: NURSING, REHAB OR LONG-TERM CARE | End: 2020-01-07
Attending: INTERNAL MEDICINE | Admitting: INTERNAL MEDICINE

## 2019-12-30 VITALS
TEMPERATURE: 98 F | RESPIRATION RATE: 18 BRPM | SYSTOLIC BLOOD PRESSURE: 139 MMHG | DIASTOLIC BLOOD PRESSURE: 49 MMHG | OXYGEN SATURATION: 100 % | HEART RATE: 67 BPM

## 2019-12-30 VITALS
SYSTOLIC BLOOD PRESSURE: 122 MMHG | OXYGEN SATURATION: 100 % | RESPIRATION RATE: 17 BRPM | TEMPERATURE: 98 F | DIASTOLIC BLOOD PRESSURE: 43 MMHG | HEART RATE: 60 BPM

## 2019-12-30 DIAGNOSIS — R07.9 CHEST PAIN, UNSPECIFIED: ICD-10-CM

## 2019-12-30 DIAGNOSIS — T82.392A OTHER MECHANICAL COMPLICATION OF FEMORAL ARTERIAL GRAFT (BYPASS), INITIAL ENCOUNTER: Chronic | ICD-10-CM

## 2019-12-30 LAB
ANION GAP SERPL CALC-SCNC: 10 MMOL/L — SIGNIFICANT CHANGE UP (ref 5–17)
BASOPHILS # BLD AUTO: 0.04 K/UL — SIGNIFICANT CHANGE UP (ref 0–0.2)
BASOPHILS NFR BLD AUTO: 0.5 % — SIGNIFICANT CHANGE UP (ref 0–2)
BUN SERPL-MCNC: 59 MG/DL — HIGH (ref 7–18)
CALCIUM SERPL-MCNC: 10.4 MG/DL — SIGNIFICANT CHANGE UP (ref 8.4–10.5)
CHLORIDE SERPL-SCNC: 98 MMOL/L — SIGNIFICANT CHANGE UP (ref 96–108)
CO2 SERPL-SCNC: 30 MMOL/L — SIGNIFICANT CHANGE UP (ref 22–31)
CREAT SERPL-MCNC: 8.07 MG/DL — HIGH (ref 0.5–1.3)
EOSINOPHIL # BLD AUTO: 0.6 K/UL — HIGH (ref 0–0.5)
EOSINOPHIL NFR BLD AUTO: 7.7 % — HIGH (ref 0–6)
GLUCOSE BLDC GLUCOMTR-MCNC: 196 MG/DL — HIGH (ref 70–99)
GLUCOSE BLDC GLUCOMTR-MCNC: 227 MG/DL — HIGH (ref 70–99)
GLUCOSE BLDC GLUCOMTR-MCNC: 250 MG/DL — HIGH (ref 70–99)
GLUCOSE BLDC GLUCOMTR-MCNC: 287 MG/DL — HIGH (ref 70–99)
GLUCOSE SERPL-MCNC: 206 MG/DL — HIGH (ref 70–99)
HCT VFR BLD CALC: 41.7 % — SIGNIFICANT CHANGE UP (ref 34.5–45)
HGB BLD-MCNC: 13 G/DL — SIGNIFICANT CHANGE UP (ref 11.5–15.5)
IMM GRANULOCYTES NFR BLD AUTO: 0.4 % — SIGNIFICANT CHANGE UP (ref 0–1.5)
LYMPHOCYTES # BLD AUTO: 1.64 K/UL — SIGNIFICANT CHANGE UP (ref 1–3.3)
LYMPHOCYTES # BLD AUTO: 21.1 % — SIGNIFICANT CHANGE UP (ref 13–44)
MAGNESIUM SERPL-MCNC: 2.5 MG/DL — SIGNIFICANT CHANGE UP (ref 1.6–2.6)
MCHC RBC-ENTMCNC: 29.1 PG — SIGNIFICANT CHANGE UP (ref 27–34)
MCHC RBC-ENTMCNC: 31.2 GM/DL — LOW (ref 32–36)
MCV RBC AUTO: 93.3 FL — SIGNIFICANT CHANGE UP (ref 80–100)
MONOCYTES # BLD AUTO: 0.81 K/UL — SIGNIFICANT CHANGE UP (ref 0–0.9)
MONOCYTES NFR BLD AUTO: 10.4 % — SIGNIFICANT CHANGE UP (ref 2–14)
NEUTROPHILS # BLD AUTO: 4.66 K/UL — SIGNIFICANT CHANGE UP (ref 1.8–7.4)
NEUTROPHILS NFR BLD AUTO: 59.9 % — SIGNIFICANT CHANGE UP (ref 43–77)
NRBC # BLD: 0 /100 WBCS — SIGNIFICANT CHANGE UP (ref 0–0)
PHOSPHATE SERPL-MCNC: 4.4 MG/DL — SIGNIFICANT CHANGE UP (ref 2.5–4.5)
PLATELET # BLD AUTO: 162 K/UL — SIGNIFICANT CHANGE UP (ref 150–400)
POTASSIUM SERPL-MCNC: 4.1 MMOL/L — SIGNIFICANT CHANGE UP (ref 3.5–5.3)
POTASSIUM SERPL-SCNC: 4.1 MMOL/L — SIGNIFICANT CHANGE UP (ref 3.5–5.3)
RBC # BLD: 4.47 M/UL — SIGNIFICANT CHANGE UP (ref 3.8–5.2)
RBC # FLD: 15.8 % — HIGH (ref 10.3–14.5)
SODIUM SERPL-SCNC: 138 MMOL/L — SIGNIFICANT CHANGE UP (ref 135–145)
WBC # BLD: 7.78 K/UL — SIGNIFICANT CHANGE UP (ref 3.8–10.5)
WBC # FLD AUTO: 7.78 K/UL — SIGNIFICANT CHANGE UP (ref 3.8–10.5)

## 2019-12-30 PROCEDURE — 85730 THROMBOPLASTIN TIME PARTIAL: CPT

## 2019-12-30 PROCEDURE — 82803 BLOOD GASES ANY COMBINATION: CPT

## 2019-12-30 PROCEDURE — 86706 HEP B SURFACE ANTIBODY: CPT

## 2019-12-30 PROCEDURE — 87340 HEPATITIS B SURFACE AG IA: CPT

## 2019-12-30 PROCEDURE — 83970 ASSAY OF PARATHORMONE: CPT

## 2019-12-30 PROCEDURE — 97162 PT EVAL MOD COMPLEX 30 MIN: CPT

## 2019-12-30 PROCEDURE — 82728 ASSAY OF FERRITIN: CPT

## 2019-12-30 PROCEDURE — 73562 X-RAY EXAM OF KNEE 3: CPT

## 2019-12-30 PROCEDURE — 83036 HEMOGLOBIN GLYCOSYLATED A1C: CPT

## 2019-12-30 PROCEDURE — 84100 ASSAY OF PHOSPHORUS: CPT

## 2019-12-30 PROCEDURE — 99261: CPT

## 2019-12-30 PROCEDURE — 71045 X-RAY EXAM CHEST 1 VIEW: CPT

## 2019-12-30 PROCEDURE — 85610 PROTHROMBIN TIME: CPT

## 2019-12-30 PROCEDURE — 99285 EMERGENCY DEPT VISIT HI MDM: CPT | Mod: 25

## 2019-12-30 PROCEDURE — 85027 COMPLETE CBC AUTOMATED: CPT

## 2019-12-30 PROCEDURE — 84443 ASSAY THYROID STIM HORMONE: CPT

## 2019-12-30 PROCEDURE — 97530 THERAPEUTIC ACTIVITIES: CPT

## 2019-12-30 PROCEDURE — 83605 ASSAY OF LACTIC ACID: CPT

## 2019-12-30 PROCEDURE — 83735 ASSAY OF MAGNESIUM: CPT

## 2019-12-30 PROCEDURE — 36415 COLL VENOUS BLD VENIPUNCTURE: CPT

## 2019-12-30 PROCEDURE — 83540 ASSAY OF IRON: CPT

## 2019-12-30 PROCEDURE — 93005 ELECTROCARDIOGRAM TRACING: CPT

## 2019-12-30 PROCEDURE — 82306 VITAMIN D 25 HYDROXY: CPT

## 2019-12-30 PROCEDURE — 82607 VITAMIN B-12: CPT

## 2019-12-30 PROCEDURE — 72170 X-RAY EXAM OF PELVIS: CPT

## 2019-12-30 PROCEDURE — 97110 THERAPEUTIC EXERCISES: CPT

## 2019-12-30 PROCEDURE — 82962 GLUCOSE BLOOD TEST: CPT

## 2019-12-30 PROCEDURE — 80048 BASIC METABOLIC PNL TOTAL CA: CPT

## 2019-12-30 PROCEDURE — 82310 ASSAY OF CALCIUM: CPT

## 2019-12-30 PROCEDURE — 82009 KETONE BODYS QUAL: CPT

## 2019-12-30 PROCEDURE — 80061 LIPID PANEL: CPT

## 2019-12-30 PROCEDURE — 80053 COMPREHEN METABOLIC PANEL: CPT

## 2019-12-30 PROCEDURE — 83550 IRON BINDING TEST: CPT

## 2019-12-30 RX ORDER — DULOXETINE HYDROCHLORIDE 30 MG/1
20 CAPSULE, DELAYED RELEASE ORAL
Refills: 0 | Status: DISCONTINUED | OUTPATIENT
Start: 2019-12-30 | End: 2020-01-07

## 2019-12-30 RX ORDER — DEXTROSE 50 % IN WATER 50 %
25 SYRINGE (ML) INTRAVENOUS ONCE
Refills: 0 | Status: DISCONTINUED | OUTPATIENT
Start: 2019-12-30 | End: 2020-01-07

## 2019-12-30 RX ORDER — FOLIC ACID 0.8 MG
1 TABLET ORAL DAILY
Refills: 0 | Status: DISCONTINUED | OUTPATIENT
Start: 2019-12-30 | End: 2020-01-07

## 2019-12-30 RX ORDER — LOSARTAN POTASSIUM 100 MG/1
25 TABLET, FILM COATED ORAL DAILY
Refills: 0 | Status: DISCONTINUED | OUTPATIENT
Start: 2019-12-30 | End: 2020-01-07

## 2019-12-30 RX ORDER — LOSARTAN POTASSIUM 100 MG/1
1 TABLET, FILM COATED ORAL
Qty: 0 | Refills: 0 | DISCHARGE

## 2019-12-30 RX ORDER — CALCITRIOL 0.5 UG/1
0.25 CAPSULE ORAL DAILY
Refills: 0 | Status: DISCONTINUED | OUTPATIENT
Start: 2019-12-30 | End: 2020-01-04

## 2019-12-30 RX ORDER — LOSARTAN POTASSIUM 100 MG/1
12.5 TABLET, FILM COATED ORAL ONCE
Refills: 0 | Status: COMPLETED | OUTPATIENT
Start: 2019-12-30 | End: 2019-12-30

## 2019-12-30 RX ORDER — CARVEDILOL PHOSPHATE 80 MG/1
12.5 CAPSULE, EXTENDED RELEASE ORAL EVERY 12 HOURS
Refills: 0 | Status: DISCONTINUED | OUTPATIENT
Start: 2019-12-30 | End: 2020-01-07

## 2019-12-30 RX ORDER — DEXTROSE 50 % IN WATER 50 %
12.5 SYRINGE (ML) INTRAVENOUS ONCE
Refills: 0 | Status: DISCONTINUED | OUTPATIENT
Start: 2019-12-30 | End: 2020-01-07

## 2019-12-30 RX ORDER — SODIUM CHLORIDE 9 MG/ML
3 INJECTION INTRAMUSCULAR; INTRAVENOUS; SUBCUTANEOUS EVERY 8 HOURS
Refills: 0 | Status: DISCONTINUED | OUTPATIENT
Start: 2019-12-30 | End: 2020-01-07

## 2019-12-30 RX ORDER — GLUCAGON INJECTION, SOLUTION 0.5 MG/.1ML
1 INJECTION, SOLUTION SUBCUTANEOUS ONCE
Refills: 0 | Status: DISCONTINUED | OUTPATIENT
Start: 2019-12-30 | End: 2020-01-07

## 2019-12-30 RX ORDER — DULOXETINE HYDROCHLORIDE 30 MG/1
1 CAPSULE, DELAYED RELEASE ORAL
Qty: 0 | Refills: 0 | DISCHARGE

## 2019-12-30 RX ORDER — DEXTROSE 50 % IN WATER 50 %
15 SYRINGE (ML) INTRAVENOUS ONCE
Refills: 0 | Status: DISCONTINUED | OUTPATIENT
Start: 2019-12-30 | End: 2020-01-07

## 2019-12-30 RX ORDER — FOLIC ACID 0.8 MG
1 TABLET ORAL
Qty: 0 | Refills: 0 | DISCHARGE

## 2019-12-30 RX ORDER — OMEPRAZOLE 10 MG/1
1 CAPSULE, DELAYED RELEASE ORAL
Qty: 0 | Refills: 0 | DISCHARGE

## 2019-12-30 RX ORDER — CLOPIDOGREL BISULFATE 75 MG/1
75 TABLET, FILM COATED ORAL DAILY
Refills: 0 | Status: DISCONTINUED | OUTPATIENT
Start: 2019-12-31 | End: 2020-01-07

## 2019-12-30 RX ORDER — SODIUM CHLORIDE 9 MG/ML
1000 INJECTION, SOLUTION INTRAVENOUS
Refills: 0 | Status: DISCONTINUED | OUTPATIENT
Start: 2019-12-30 | End: 2020-01-07

## 2019-12-30 RX ORDER — ASPIRIN/CALCIUM CARB/MAGNESIUM 324 MG
1 TABLET ORAL
Qty: 0 | Refills: 0 | DISCHARGE

## 2019-12-30 RX ORDER — CALCIUM ACETATE 667 MG
1334 TABLET ORAL
Refills: 0 | Status: DISCONTINUED | OUTPATIENT
Start: 2019-12-30 | End: 2020-01-04

## 2019-12-30 RX ORDER — ATORVASTATIN CALCIUM 80 MG/1
20 TABLET, FILM COATED ORAL AT BEDTIME
Refills: 0 | Status: DISCONTINUED | OUTPATIENT
Start: 2019-12-30 | End: 2020-01-07

## 2019-12-30 RX ORDER — FLUCONAZOLE 150 MG/1
1 TABLET ORAL
Qty: 0 | Refills: 0 | DISCHARGE

## 2019-12-30 RX ORDER — INSULIN LISPRO 100/ML
VIAL (ML) SUBCUTANEOUS
Refills: 0 | Status: DISCONTINUED | OUTPATIENT
Start: 2019-12-30 | End: 2020-01-07

## 2019-12-30 RX ORDER — ASPIRIN/CALCIUM CARB/MAGNESIUM 324 MG
81 TABLET ORAL DAILY
Refills: 0 | Status: DISCONTINUED | OUTPATIENT
Start: 2019-12-30 | End: 2020-01-07

## 2019-12-30 RX ORDER — INSULIN GLARGINE 100 [IU]/ML
18 INJECTION, SOLUTION SUBCUTANEOUS AT BEDTIME
Refills: 0 | Status: DISCONTINUED | OUTPATIENT
Start: 2019-12-30 | End: 2020-01-01

## 2019-12-30 RX ORDER — CALCITRIOL 0.5 UG/1
1 CAPSULE ORAL
Qty: 0 | Refills: 0 | DISCHARGE

## 2019-12-30 RX ADMIN — Medication 81 MILLIGRAM(S): at 22:38

## 2019-12-30 RX ADMIN — PANTOPRAZOLE SODIUM 40 MILLIGRAM(S): 20 TABLET, DELAYED RELEASE ORAL at 06:00

## 2019-12-30 RX ADMIN — DULOXETINE HYDROCHLORIDE 20 MILLIGRAM(S): 30 CAPSULE, DELAYED RELEASE ORAL at 05:59

## 2019-12-30 RX ADMIN — CARVEDILOL PHOSPHATE 12.5 MILLIGRAM(S): 80 CAPSULE, EXTENDED RELEASE ORAL at 18:46

## 2019-12-30 RX ADMIN — Medication 3: at 22:37

## 2019-12-30 RX ADMIN — ATORVASTATIN CALCIUM 20 MILLIGRAM(S): 80 TABLET, FILM COATED ORAL at 22:38

## 2019-12-30 RX ADMIN — Medication 4 UNIT(S): at 08:27

## 2019-12-30 RX ADMIN — Medication 2: at 08:26

## 2019-12-30 RX ADMIN — CARVEDILOL PHOSPHATE 12.5 MILLIGRAM(S): 80 CAPSULE, EXTENDED RELEASE ORAL at 05:59

## 2019-12-30 RX ADMIN — Medication 1 MILLIGRAM(S): at 22:38

## 2019-12-30 RX ADMIN — HEPARIN SODIUM 5000 UNIT(S): 5000 INJECTION INTRAVENOUS; SUBCUTANEOUS at 05:59

## 2019-12-30 RX ADMIN — Medication 1334 MILLIGRAM(S): at 08:27

## 2019-12-30 RX ADMIN — CHLORHEXIDINE GLUCONATE 1 APPLICATION(S): 213 SOLUTION TOPICAL at 06:00

## 2019-12-30 RX ADMIN — SODIUM CHLORIDE 3 MILLILITER(S): 9 INJECTION INTRAMUSCULAR; INTRAVENOUS; SUBCUTANEOUS at 22:25

## 2019-12-30 RX ADMIN — LOSARTAN POTASSIUM 25 MILLIGRAM(S): 100 TABLET, FILM COATED ORAL at 05:59

## 2019-12-30 RX ADMIN — LOSARTAN POTASSIUM 12.5 MILLIGRAM(S): 100 TABLET, FILM COATED ORAL at 22:54

## 2019-12-30 RX ADMIN — INSULIN GLARGINE 18 UNIT(S): 100 INJECTION, SOLUTION SUBCUTANEOUS at 22:37

## 2019-12-30 NOTE — PROGRESS NOTE ADULT - SUBJECTIVE AND OBJECTIVE BOX
Interval Events:  pt in nad    Allergies    No Known Allergies    Intolerances      Endocrine/Metabolic Medications:  atorvastatin 20 milliGRAM(s) Oral at bedtime  insulin glargine Injectable (LANTUS) 18 Unit(s) SubCutaneous at bedtime  insulin lispro (HumaLOG) corrective regimen sliding scale   SubCutaneous Before meals and at bedtime  insulin lispro Injectable (HumaLOG) 4 Unit(s) SubCutaneous three times a day before meals      Vital Signs Last 24 Hrs  T(C): 36.7 (30 Dec 2019 10:05), Max: 36.9 (29 Dec 2019 22:20)  T(F): 98 (30 Dec 2019 10:05), Max: 98.5 (29 Dec 2019 22:20)  HR: 60 (30 Dec 2019 10:05) (60 - 84)  BP: 122/43 (30 Dec 2019 10:05) (122/43 - 139/56)  BP(mean): --  RR: 17 (30 Dec 2019 10:05) (16 - 18)  SpO2: 100% (30 Dec 2019 10:05) (94% - 100%)      PHYSICAL EXAM  All physical exam findings normal, except those marked:  General:	Alert, active, cooperative, NAD, well hydrated  .		[] Abnormal:  Neck		Normal: supple, no cervical adenopathy, no palpable thyroid  .		[] Abnormal:  Cardiovascular	Normal: regular rate, normal S1, S2, no murmurs  .		[] Abnormal:  Respiratory	Normal: no chest wall deformity, normal respiratory pattern, CTA B/L  .		[] Abnormal:  Abdominal	Normal: soft, ND, NT, bowel sounds present, no masses, no organomegaly  .		[] Abnormal:  		Normal normal genitalia, testes descended, circumcised/uncircumcised  .		Stormy stage:			Breast stormy:  .		Menstrual history:  .		[] Abnormal:  Extremities	Normal: FROM x4  .		[] Abnormal:  Skin		Normal: intact and not indurated, no rash, no acanthosis nigricans  .		[] Abnormal:  Neurologic	Normal: grossly intact  .		[] Abnormal:    LABS                        13.0   7.78  )-----------( 162      ( 30 Dec 2019 07:04 )             41.7                               138    |  98     |  59                  Calcium: 10.4  / iCa: x      (12-30 @ 07:04)    ----------------------------<  206       Magnesium: 2.5                              4.1     |  30     |  8.07             Phosphorous: 4.4        CAPILLARY BLOOD GLUCOSE      POCT Blood Glucose.: 196 mg/dL (30 Dec 2019 07:43)  POCT Blood Glucose.: 198 mg/dL (29 Dec 2019 21:51)  POCT Blood Glucose.: 260 mg/dL (29 Dec 2019 16:49)  POCT Blood Glucose.: 350 mg/dL (29 Dec 2019 11:32)        Assesment/plan    Dm- remains un cont  cont lantus to 18 units  pre meal humalog 4 units  change to prandin 1mg ac tid upon d/c   fsg ac and hs  aim fsg 140-<200

## 2019-12-30 NOTE — CHART NOTE - NSCHARTNOTEFT_GEN_A_CORE
Patient is s/p cardiac cath via right femoral access.  Site is stable with no hematoma, active bleed or swelling.  Dressing is clean/dry/intact.  DP pulse is palpable.  Patient without complaints.  Will continue to monitor.    Vital Signs Last 24 Hrs  T(C): 36.8 (30 Dec 2019 19:45), Max: 36.9 (29 Dec 2019 22:20)  T(F): 98.2 (30 Dec 2019 19:45), Max: 98.5 (29 Dec 2019 22:20)  HR: 67 (30 Dec 2019 19:45) (60 - 70)  BP: 139/49 (30 Dec 2019 19:45) (122/43 - 139/49)  RR: 18 (30 Dec 2019 19:45) (17 - 18)  SpO2: 100% (30 Dec 2019 19:45) (96% - 100%)

## 2019-12-30 NOTE — TRANSFER ACCEPTANCE NOTE - HISTORY OF PRESENT ILLNESS
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  Per sister at bedside, pt has severe arthritis in her right knee and last night when pt was trying to use commode, pt fell down because of her right knee pain and fell on right knee. Pt did not loss consciousness, or hit her head. Patient states that when she fell, she landed on her right knee which is now in pain. Patient denies any head trauma or HA, LOC, dizziness, chest pain, shortness of breath, vomiting, abd pain, dysuria, or any other acute complaints. 74 year old female with PMH HTN, HLD, CAD (PCI/stent LCx), PVD, CHF, IDDM, and ESRD (Tu,Th,Sat, on Dialysis last completed on Saturday) and PSH L AKA.  Presented to  ER on 12/26 with c/o high blood glucose and mechanical fall last night, per patient she did not lose consciousness or hit her head. Recent ischemia noted on stress test 9/2019 which was cancelled due to flu, transferred to [facility] today for LHC.  Patient  denies CP/SOB/palpitations dizziness, nausea/vomiting.

## 2019-12-30 NOTE — TRANSFER ACCEPTANCE NOTE - PROBLEM SELECTOR PLAN 1
mechanical fall 2/2 arthritis   xray R knee and pelvic negative for fracture   will do pain management and physical therapy mechanical fall 2/2 arthritis   xray R knee and pelvic negative for fracture    pain management and physical therapy

## 2019-12-30 NOTE — CONSULT NOTE ADULT - SUBJECTIVE AND OBJECTIVE BOX
[facility] Consult Note Nephrology - CONSULTATION NOTE - [phone] - Dr Bueno / Dr Mancini / Dr Russell / Dr La / Dr Saenz / Dr Covarrubias / Dr Arriaza / Dr Dykes    Patient is a 74y Female whom presented to the hospital with HTN, HLD, CAD (PCI/stent LCx), PVD, CHF, IDDM, and ESRD (Tu,Th,Sat) and PSH L AKA.  Presented to  ER on 12/26 with c/o high blood glucose and mechanical fall last night, per patient she did not lose consciousness or hit her head. Recent ischemia noted on stress test 9/2019 which was cancelled due to flu, transferred to [facility] today for LHC.  Patient  denies CP/SOB/palpitations dizziness, nausea/vomiting.        PAST MEDICAL & SURGICAL HISTORY:  Bacteremia: diagnosed 2 weeks ago   been treated with IV cefazolin 200 mg on TUES and THUR and 300 mg on SAT, for 21 days, last dose on 5/10/16  Asymmetrical hearing loss, left  Anemia in chronic kidney disease: last PRBC transfusion January 2016  Stented coronary artery  CAD (coronary artery disease): S/P stent  Peripheral vascular disease  Peripheral neuropathy  HTN (hypertension)  Diabetes mellitus: type II,     fingerstick range in the morning 88- 102 mg/dl  Congestive heart failure: last admission8/2015  most recent echo on chart  Hyperlipidemia: dyslipidemia  ESRD (end stage renal disease) on dialysis: patiient on hemodialysis  Femoral-popliteal bypass graft occlusion, left  Arteriovenous fistula: Left  S/P cataract surgery: Bilateral  AVF (arteriovenous fistula): right anterior chest wall (placed May 2015 at [facility])    Allergies:  No Known Allergies    Home Medications Reviewed  Hospital Medications:   MEDICATIONS  (STANDING):    SOCIAL HISTORY:  Denies ETOh,Smoking,   FAMILY HISTORY:  Family history of diabetes mellitus    REVIEW OF SYSTEMS:  CONSTITUTIONAL: No weakness, fevers or chills  EYES/ENT: No visual changes;  No vertigo or throat pain   NECK: No pain or stiffness  RESPIRATORY: No cough, wheezing, hemoptysis; No shortness of breath  CARDIOVASCULAR: No chest pain or palpitations.  GASTROINTESTINAL: No abdominal or epigastric pain. No nausea, vomiting, or hematemesis; No diarrhea or constipation. No melena or hematochezia.  GENITOURINARY: No dysuria, frequency, foamy urine, urinary urgency, incontinence or hematuria  NEUROLOGICAL: No numbness or weakness  SKIN: No itching, burning, rashes, or lesions   VASCULAR: No bilateral lower extremity edema.   All other review of systems is negative unless indicated above.    VITALS:  T(F): 98 (12-30-19 @ 10:05), Max: 98.5 (12-29-19 @ 22:20)  HR: 60 (12-30-19 @ 10:05)  BP: 122/43 (12-30-19 @ 10:05)  RR: 17 (12-30-19 @ 10:05)  SpO2: 100% (12-30-19 @ 10:05)  Wt(kg): --    Height (cm): 152.4 (12-30 @ 11:52)  Weight (kg): 61.2 (12-30 @ 11:52)  BMI (kg/m2): 26.4 (12-30 @ 11:52)  BSA (m2): 1.58 (12-30 @ 11:52)  PHYSICAL EXAM:  Constitutional: NAD  HEENT: anicteric sclera, oropharynx clear, MMM  Neck: No JVD  Respiratory: CTAB, no wheezes, rales or rhonchi  Cardiovascular: S1, S2, RRR  Gastrointestinal: BS+, soft, NT/ND  Extremities: No cyanosis or clubbing. No peripheral edema  Neurological: A/O x 3, no focal deficits  Psychiatric: Normal mood, normal affect  : No CVA tenderness. No kemp.   Skin: No rashes  Vascular Access: L chest wall AV access +thrill     LABS:  12-30    138  |  98  |  59<H>  ----------------------------<  206<H>  4.1   |  30  |  8.07<H>    Ca    10.4      30 Dec 2019 07:04  Phos  4.4     12-30  Mg     2.5     12-30      Creatinine Trend: 8.07 <--, 6.18 <--, 7.44 <--, 5.63 <--, 8.48 <--                        13.0   7.78  )-----------( 162      ( 30 Dec 2019 07:04 )             41.7

## 2019-12-30 NOTE — TRANSFER ACCEPTANCE NOTE - PSH
Arteriovenous fistula  Left  AVF (arteriovenous fistula)  right anterior chest wall (placed May 2015 at [facility])  Femoral-popliteal bypass graft occlusion, left    S/P cataract surgery  Bilateral

## 2019-12-30 NOTE — CONSULT NOTE ADULT - ASSESSMENT
A/P  ESRD ON HD,  WAS  DIALYZED   12/28,  Plan for repeat HD tomorrow.   EPO  ON  HOLD  AS  HB above goal.   BP  ACCEPTABLE  Plan for cardiac cath today.  Consent for HD obtained.

## 2019-12-30 NOTE — PROGRESS NOTE ADULT - REASON FOR ADMISSION
Weakness, hyperglycemia

## 2019-12-30 NOTE — PROGRESS NOTE ADULT - SUBJECTIVE AND OBJECTIVE BOX
Patient denies CP, SOB Review of systems otherwise (-)    acetaminophen   Tablet .. 650 milliGRAM(s) Oral every 6 hours PRN  atorvastatin 20 milliGRAM(s) Oral at bedtime  calcitriol   Capsule 0.25 MICROGram(s) Oral daily  calcium acetate 1334 milliGRAM(s) Oral three times a day with meals  carvedilol 12.5 milliGRAM(s) Oral every 12 hours  chlorhexidine 4% Liquid 1 Application(s) Topical <User Schedule>  clopidogrel Tablet 75 milliGRAM(s) Oral daily  DULoxetine 20 milliGRAM(s) Oral every 12 hours  heparin  Injectable 5000 Unit(s) SubCutaneous every 12 hours  insulin glargine Injectable (LANTUS) 18 Unit(s) SubCutaneous at bedtime  insulin lispro (HumaLOG) corrective regimen sliding scale   SubCutaneous Before meals and at bedtime  insulin lispro Injectable (HumaLOG) 4 Unit(s) SubCutaneous three times a day before meals  losartan 25 milliGRAM(s) Oral daily  pantoprazole    Tablet 40 milliGRAM(s) Oral before breakfast                            13.0   7.78  )-----------( 162      ( 30 Dec 2019 07:04 )             41.7       Hemoglobin: 13.0 g/dL (12-30 @ 07:04)  Hemoglobin: 13.2 g/dL (12-29 @ 06:57)  Hemoglobin: 12.9 g/dL (12-28 @ 06:00)  Hemoglobin: 12.3 g/dL (12-27 @ 07:02)  Hemoglobin: 14.1 g/dL (12-26 @ 11:33)      12-30    138  |  98  |  59<H>  ----------------------------<  206<H>  4.1   |  30  |  8.07<H>    Ca    10.4      30 Dec 2019 07:04  Phos  4.4     12-30  Mg     2.5     12-30      Creatinine Trend: 8.07<--, 6.18<--, 7.44<--, 5.63<--, 8.48<--    COAGS:           T(C): 36.7 (12-30-19 @ 10:05), Max: 36.9 (12-29-19 @ 22:20)  HR: 60 (12-30-19 @ 10:05) (60 - 84)  BP: 122/43 (12-30-19 @ 10:05) (122/43 - 139/56)  RR: 17 (12-30-19 @ 10:05) (16 - 18)  SpO2: 100% (12-30-19 @ 10:05) (94% - 100%)  Wt(kg): --    I&O's Summary    29 Dec 2019 07:01  -  30 Dec 2019 07:00  --------------------------------------------------------  IN: 240 mL / OUT: 0 mL / NET: 240 mL        Gen: Left AKA  HEENT:  (-)icterus (-)pallor  CV: N S1 S2 1/6 COCO (+)2 Pulses B/l  Resp:  Clear to ausculatation B/L, normal effort  GI: (+) BS Soft, NT, ND  Lymph:  (-)Edema, (-)obvious lymphadenopathy  Skin: Warm to touch, Normal turgor  Psych: Appropriate mood and affect      ECG:  	Sinus     RADIOLOGY:         CXR:  No infiltrate      ASSESSMENT/PLAN: 	74y Female PMhx of HTN, HLD, CAD, s/p PCI to LCX in 2015, recent Ischemia noted on stress in 9/19 cancelled cath due to flu, DM, osteo arthritis and ESRD (Tu,Th,Sat, on Dialysis Left AKA, admitted with fall and hyperglycemia.    - cont Plavix , Coreg , statin , ACE .   - HD per renal , tolerated HD session well yesterday .    - Glycemic control per endo   - Stress with moderate to severe ischemia in the mid anterior, anterolateral and inferolateral walls  - D/W patient in detail plan for cath at [facility] today.  Patient agrees    Carlos Diamond MD, [facility]  BEEPER [phone]

## 2019-12-31 ENCOUNTER — TRANSCRIPTION ENCOUNTER (OUTPATIENT)
Age: 74
End: 2019-12-31

## 2019-12-31 LAB
ANION GAP SERPL CALC-SCNC: 17 MMO/L — HIGH (ref 7–14)
BUN SERPL-MCNC: 72 MG/DL — HIGH (ref 7–23)
CALCIUM SERPL-MCNC: 10.1 MG/DL — SIGNIFICANT CHANGE UP (ref 8.4–10.5)
CHLORIDE SERPL-SCNC: 94 MMOL/L — LOW (ref 98–107)
CO2 SERPL-SCNC: 24 MMOL/L — SIGNIFICANT CHANGE UP (ref 22–31)
CREAT SERPL-MCNC: 9.25 MG/DL — HIGH (ref 0.5–1.3)
GLUCOSE BLDC GLUCOMTR-MCNC: 123 MG/DL — HIGH (ref 70–99)
GLUCOSE BLDC GLUCOMTR-MCNC: 128 MG/DL — HIGH (ref 70–99)
GLUCOSE BLDC GLUCOMTR-MCNC: 148 MG/DL — HIGH (ref 70–99)
GLUCOSE BLDC GLUCOMTR-MCNC: 151 MG/DL — HIGH (ref 70–99)
GLUCOSE SERPL-MCNC: 131 MG/DL — HIGH (ref 70–99)
HCT VFR BLD CALC: 41.9 % — SIGNIFICANT CHANGE UP (ref 34.5–45)
HGB BLD-MCNC: 12.7 G/DL — SIGNIFICANT CHANGE UP (ref 11.5–15.5)
MAGNESIUM SERPL-MCNC: 2.3 MG/DL — SIGNIFICANT CHANGE UP (ref 1.6–2.6)
MCHC RBC-ENTMCNC: 28.5 PG — SIGNIFICANT CHANGE UP (ref 27–34)
MCHC RBC-ENTMCNC: 30.3 % — LOW (ref 32–36)
MCV RBC AUTO: 94.2 FL — SIGNIFICANT CHANGE UP (ref 80–100)
NRBC # FLD: 0 K/UL — SIGNIFICANT CHANGE UP (ref 0–0)
PHOSPHATE SERPL-MCNC: 4.5 MG/DL — SIGNIFICANT CHANGE UP (ref 2.5–4.5)
PLATELET # BLD AUTO: 165 K/UL — SIGNIFICANT CHANGE UP (ref 150–400)
PMV BLD: 11.1 FL — SIGNIFICANT CHANGE UP (ref 7–13)
POTASSIUM SERPL-MCNC: 4.6 MMOL/L — SIGNIFICANT CHANGE UP (ref 3.5–5.3)
POTASSIUM SERPL-SCNC: 4.6 MMOL/L — SIGNIFICANT CHANGE UP (ref 3.5–5.3)
RBC # BLD: 4.45 M/UL — SIGNIFICANT CHANGE UP (ref 3.8–5.2)
RBC # FLD: 15.6 % — HIGH (ref 10.3–14.5)
SODIUM SERPL-SCNC: 135 MMOL/L — SIGNIFICANT CHANGE UP (ref 135–145)
WBC # BLD: 7.3 K/UL — SIGNIFICANT CHANGE UP (ref 3.8–10.5)
WBC # FLD AUTO: 7.3 K/UL — SIGNIFICANT CHANGE UP (ref 3.8–10.5)

## 2019-12-31 RX ORDER — ACETAMINOPHEN 500 MG
650 TABLET ORAL ONCE
Refills: 0 | Status: COMPLETED | OUTPATIENT
Start: 2019-12-31 | End: 2019-12-31

## 2019-12-31 RX ORDER — SENNA PLUS 8.6 MG/1
2 TABLET ORAL AT BEDTIME
Refills: 0 | Status: DISCONTINUED | OUTPATIENT
Start: 2019-12-31 | End: 2020-01-07

## 2019-12-31 RX ORDER — POLYETHYLENE GLYCOL 3350 17 G/17G
17 POWDER, FOR SOLUTION ORAL
Refills: 0 | Status: DISCONTINUED | OUTPATIENT
Start: 2019-12-31 | End: 2020-01-07

## 2019-12-31 RX ADMIN — SODIUM CHLORIDE 3 MILLILITER(S): 9 INJECTION INTRAMUSCULAR; INTRAVENOUS; SUBCUTANEOUS at 15:27

## 2019-12-31 RX ADMIN — DULOXETINE HYDROCHLORIDE 20 MILLIGRAM(S): 30 CAPSULE, DELAYED RELEASE ORAL at 06:33

## 2019-12-31 RX ADMIN — INSULIN GLARGINE 18 UNIT(S): 100 INJECTION, SOLUTION SUBCUTANEOUS at 22:26

## 2019-12-31 RX ADMIN — SODIUM CHLORIDE 3 MILLILITER(S): 9 INJECTION INTRAMUSCULAR; INTRAVENOUS; SUBCUTANEOUS at 06:32

## 2019-12-31 RX ADMIN — LOSARTAN POTASSIUM 25 MILLIGRAM(S): 100 TABLET, FILM COATED ORAL at 06:33

## 2019-12-31 RX ADMIN — CARVEDILOL PHOSPHATE 12.5 MILLIGRAM(S): 80 CAPSULE, EXTENDED RELEASE ORAL at 06:33

## 2019-12-31 RX ADMIN — Medication 81 MILLIGRAM(S): at 08:46

## 2019-12-31 RX ADMIN — CLOPIDOGREL BISULFATE 75 MILLIGRAM(S): 75 TABLET, FILM COATED ORAL at 08:46

## 2019-12-31 RX ADMIN — Medication 650 MILLIGRAM(S): at 22:56

## 2019-12-31 NOTE — DISCHARGE NOTE PROVIDER - PROVIDER TOKENS
PROVIDER:[TOKEN:[[id number]]] PROVIDER:[TOKEN:[[id number]]],PROVIDER:[TOKEN:[[id number]]],PROVIDER:[TOKEN:[[id number]]]

## 2019-12-31 NOTE — PROGRESS NOTE ADULT - ASSESSMENT
ESRD ON HD,  outpt TTS  WAS last  DIALYZED  12/28,    BP  ACCEPTABLE  CAD s/p PCI w/LCx stent  DM- mx per primary team    labs, chart reviewed  scheduled for HD tonight w/2k bath, no heparin, uf 1kg as tolearted  no EPO AS  HB above goal.   c/w BB, ARB  c/w renal restrictions in diet

## 2019-12-31 NOTE — CHART NOTE - NSCHARTNOTEFT_GEN_A_CORE
Patient is s/p cardiac cath via right femoral access.  Site is stable with no hematoma, active bleed or swelling.  Dressing is clean/dry/intact.  DP pulse is palpable.  Patient complaining of leg pain from where she fell a few days ago, will order Tylenol.  Will continue to monitor.

## 2019-12-31 NOTE — DISCHARGE NOTE PROVIDER - HOSPITAL COURSE
74 year old female with PMH HTN, HLD, CAD (PCI/stent LCx), PVD, CHF, IDDM, and ESRD (Tu,Th,Sat, on Dialysis last completed on Saturday) and PSH L AKA transferred to [facility] for cardiac cath        Hospital course:        Pt with abnormal cath underwent cath 12/30 noted LHC: pLAD 70%, dLAD severe dz, pLCx 90%, pRCA stent patent, dRCA 80%, RPDA 60%, LVEDP 11, RFA accessed. Pt with triple vessel dz, cardiology discussed with CTS, pt not a candidate for CABG. Pt underwent staged PCI 2/31 s/p LHC via RFA LCx 95% RICKEY x1.             INCOMPLETE 74 year old female with PMH HTN, HLD, CAD (PCI/stent LCx), PVD, CHF, IDDM, and ESRD (Tu,Th,Sat, on Dialysis last completed on Saturday) and PSH L AKA transferred to [facility] for cardiac cath        Hospital course:        Pt with abnormal cath underwent cath 12/30 noted LHC: pLAD 70%, dLAD severe dz, pLCx 90%, pRCA stent patent, dRCA 80%, RPDA 60%, LVEDP 11, RFA accessed. Pt with triple vessel dz, cardiology discussed with CTS, pt not a candidate for CABG. Pt underwent staged PCI 2/31 s/p LHC via RFA LCx 95% RICKEY x1. Recommended for medical management of LAD. Can consider cath in future if symptoms worsen. PT recommended rehab 74 year old female with PMH HTN, HLD, CAD (PCI/stent LCx), PVD, CHF, IDDM, and ESRD (Tu,Th,Sat, on Dialysis last completed on Saturday) and PSH L AKA transferred to [facility] for cardiac cath        Hospital course:        Pt with abnormal cath underwent cath 12/30 noted LHC: pLAD 70%, dLAD severe dz, pLCx 90%, pRCA stent patent, dRCA 80%, RPDA 60%, LVEDP 11, RFA accessed. Pt with triple vessel dz, cardiology discussed with CTS, pt not a candidate for CABG. Pt underwent staged PCI 2/31 s/p LHC via RFA LCx 95% RICKEY x1. Recommended for medical management of LAD. Can consider cath in future if symptoms worsen. Endocrine consulted for FS control. Recommended to discharge on prandin 1mg TID before meal. No insulin. PT recommended rehab        As per Dr Farrell pt cleared for discharge on 1/6. Reviewed discharge medications with patient; All new medications requiring new prescription sent to pharmacy of patients choice. Reviewed need for prescription for previous home medication and new prescriptions sent if requested. Patient in agreement and understands.

## 2019-12-31 NOTE — DISCHARGE NOTE PROVIDER - NSDCMRMEDTOKEN_GEN_ALL_CORE_FT
aspirin 81 mg oral tablet: 1 tab(s) orally once a day  atorvastatin 20 mg oral tablet: 1 tab(s) orally once a day (at bedtime)  Basaglar KwikPen 100 units/mL subcutaneous solution: 18 unit(s) subcutaneous once a day (at bedtime)  calcitriol 0.25 mcg oral capsule: 1 cap(s) orally once a day  calcium acetate 667 mg oral tablet: 2 tab(s) orally 2 times a day (before meals)  carvedilol 12.5 mg oral tablet: 1 tab(s) orally every 12 hours  Cymbalta 20 mg oral delayed release capsule: 1 cap(s) orally 2 times a day  folic acid 1 mg oral tablet: 1 tab(s) orally once a day  losartan 25 mg oral tablet: 1 tab(s) orally once a day  NovoLOG 100 units/mL injectable solution: 4 unit(s) injectable 3 times a day (before meals)  Plavix 75 mg oral tablet: 1 tab(s) orally once a day  repaglinide 0.5 mg oral tablet: 1 tab(s) orally 3 times a day (before meals) aspirin 81 mg oral tablet: 1 tab(s) orally once a day  atorvastatin 20 mg oral tablet: 1 tab(s) orally once a day (at bedtime)  Basaglar KwikPen 100 units/mL subcutaneous solution: 18 unit(s) subcutaneous once a day (at bedtime)  calcitriol 0.25 mcg oral capsule: 1 cap(s) orally once a day  calcium acetate 667 mg oral tablet: 2 tab(s) orally 2 times a day (before meals)  carvedilol 12.5 mg oral tablet: 1 tab(s) orally every 12 hours  Cymbalta 20 mg oral delayed release capsule: 1 cap(s) orally 2 times a day  folic acid 1 mg oral tablet: 1 tab(s) orally once a day  losartan 25 mg oral tablet: 1 tab(s) orally once a day  NovoLOG 100 units/mL injectable solution: 4 unit(s) injectable 3 times a day (before meals)  Plavix 75 mg oral tablet: 1 tab(s) orally once a day  polyethylene glycol 3350 oral powder for reconstitution: 17 gram(s) orally 2 times a day, As needed, Constipation  repaglinide 1 mg oral tablet: 1 tab(s) orally 3 times a day (before meals)  senna oral tablet: 2 tab(s) orally once a day (at bedtime) aspirin 81 mg oral tablet: 1 tab(s) orally once a day  atorvastatin 20 mg oral tablet: 1 tab(s) orally once a day (at bedtime)  calcitriol 0.25 mcg oral capsule: 1 cap(s) orally once a day  calcium acetate 667 mg oral tablet: 2 tab(s) orally 2 times a day (before meals)  carvedilol 12.5 mg oral tablet: 1 tab(s) orally every 12 hours  Cymbalta 20 mg oral delayed release capsule: 1 cap(s) orally 2 times a day  folic acid 1 mg oral tablet: 1 tab(s) orally once a day  losartan 25 mg oral tablet: 1 tab(s) orally once a day  Plavix 75 mg oral tablet: 1 tab(s) orally once a day  polyethylene glycol 3350 oral powder for reconstitution: 17 gram(s) orally 2 times a day, As needed, Constipation  repaglinide 1 mg oral tablet: 1 tab(s) orally 3 times a day (before meals)  senna oral tablet: 2 tab(s) orally once a day (at bedtime)

## 2019-12-31 NOTE — PROGRESS NOTE ADULT - SUBJECTIVE AND OBJECTIVE BOX
[facility] - [place] / Ximena S /Abhinav/ S Dany/ TYLER Russell/ Harjit Arriaza / KIERSTEN Njeru  ---------------------------------------------------------------------------------------------------------------    Patient seen and examined bedside, in cath recovery room    Subjective and Objective: No overnight events, denied cp/ sob. No complaints today.   s/p PCI w/LCx stent    Allergies: No Known Allergies      Hospital Medications:   MEDICATIONS  (STANDING):  aspirin enteric coated 81 milliGRAM(s) Oral daily  atorvastatin 20 milliGRAM(s) Oral at bedtime  calcitriol   Capsule 0.25 MICROGram(s) Oral daily  calcium acetate 1334 milliGRAM(s) Oral three times a day with meals  carvedilol 12.5 milliGRAM(s) Oral every 12 hours  clopidogrel Tablet 75 milliGRAM(s) Oral daily  dextrose 5%. 1000 milliLiter(s) (50 mL/Hr) IV Continuous <Continuous>  dextrose 50% Injectable 12.5 Gram(s) IV Push once  dextrose 50% Injectable 25 Gram(s) IV Push once  dextrose 50% Injectable 25 Gram(s) IV Push once  DULoxetine 20 milliGRAM(s) Oral two times a day  folic acid 1 milliGRAM(s) Oral daily  insulin glargine Injectable (LANTUS) 18 Unit(s) SubCutaneous at bedtime  insulin lispro (HumaLOG) corrective regimen sliding scale   SubCutaneous three times a day before meals  losartan 25 milliGRAM(s) Oral daily  senna 2 Tablet(s) Oral at bedtime  sodium chloride 0.9% lock flush 3 milliLiter(s) IV Push every 8 hours      VITALS:  T(F): 98.8 (12-31-19 @ 09:23), Max: 98.8 (12-31-19 @ 09:23)  HR: 67 (12-31-19 @ 09:23)  BP: 117/60 (12-31-19 @ 09:23)  RR: 16 (12-31-19 @ 09:23)  SpO2: 99% (12-31-19 @ 09:23)  Wt(kg): --        PHYSICAL EXAM:  Constitutional: NAD  HEENT: anicteric sclera, oropharynx clear  Neck: No JVD  Respiratory: CTAB, no wheezes, rales or rhonchi  Cardiovascular: S1, S2, RRR  Gastrointestinal: BS+, soft, NT/ND  Extremities: No cyanosis or clubbing. No peripheral edema  Neurological: A/O x 3, no focal deficits  Psychiatric: Normal mood, normal affect  : No kemp.   Vascular Access: left chest AVG+    LABS:  12-31    135  |  94<L>  |  72<H>  ----------------------------<  131<H>  4.6   |  24  |  9.25<H>    Ca    10.1      31 Dec 2019 09:15  Phos  4.5     12-31  Mg     2.3     12-31      Creatinine Trend: 9.25 <--, 8.07 <--, 6.18 <--, 7.44 <--, 5.63 <--, 8.48 <--                        12.7   7.30  )-----------( 165      ( 31 Dec 2019 09:15 )             41.9     Urine Studies:        RADIOLOGY & ADDITIONAL STUDIES:

## 2019-12-31 NOTE — DISCHARGE NOTE PROVIDER - NSDCCPCAREPLAN_GEN_ALL_CORE_FT
PRINCIPAL DISCHARGE DIAGNOSIS  Diagnosis: Triple vessel disease of the heart  Assessment and Plan of Treatment: you had cardiac cath this admission with stents to LCx. LHC: pLAD 70%, dLAD severe dz, pLCx 90%, pRCA stent patent, dRCA 80%, RPDA 60%, LVEDP 11. continue aspirin and plavix. followup with cardiology in 1 week for continue managment      SECONDARY DISCHARGE DIAGNOSES  Diagnosis: Chronic heart failure  Assessment and Plan of Treatment: Low salt diet, fluid restriction to 1500 ml daily, monitor your fluid intake and weight daily, exercise as tolerated 30 minutes daily, and follow up with your physician within 1 to 2 weeks.    Diagnosis: ESRD on dialysis  Assessment and Plan of Treatment: Please follow up with your nephrologist for management, and continue your schedule hemodialysis.    Diagnosis: HLD (hyperlipidemia)  Assessment and Plan of Treatment: Low fat diet, exercise daily and continue current medications. Follow up with primary care physician and cardiologist for management.    Diagnosis: HTN (hypertension)  Assessment and Plan of Treatment: Low sodium and fat diet, continue anti-hypertensive medications, and follow up with primary care physician.    Diagnosis: DM (diabetes mellitus), type 2  Assessment and Plan of Treatment: Hypoglycemia management: please check your fingerstick every morning or if you are not feeling well. If your FS is >300mg/dl X3 or more readings please contact your PMD/Endocrinologist. If your FS is low <70mg/dl and/or you have symptoms of very low blood sugar FIRST drink1/2 cup of apple juice (or take 4 glucose tab/tube of glucose gel) and recheck FS in 15mins. Repeat these steps until blood sugar is above 100mg/dl, if NECESSARY. Then call your provider to discuss low blood sugar.   What to expend at followup appointment: please bring a log of your fingerstick and/or your glucometer to you appointment. Your blood sugar tracking will help your diabetes team determine the best plan PRINCIPAL DISCHARGE DIAGNOSIS  Diagnosis: Triple vessel disease of the heart  Assessment and Plan of Treatment: you had cardiac cath this admission with stents to LCx. LHC: pLAD 70%, dLAD severe dz, pLCx 90%, pRCA stent patent, dRCA 80%, RPDA 60%, LVEDP 11. continue aspirin and plavix. follow up with Dr Odom 1/13 @ 1045am for cardiology      SECONDARY DISCHARGE DIAGNOSES  Diagnosis: Chronic heart failure  Assessment and Plan of Treatment: Low salt diet, fluid restriction to 1500 ml daily, monitor your fluid intake and weight daily, exercise as tolerated 30 minutes daily, and follow up with your physician within 1 to 2 weeks.    Diagnosis: ESRD on dialysis  Assessment and Plan of Treatment: Please follow up with your nephrologist for management, and continue your schedule hemodialysis.    Diagnosis: HLD (hyperlipidemia)  Assessment and Plan of Treatment: Low fat diet, exercise daily and continue current medications. Follow up with primary care physician and cardiologist for management.    Diagnosis: HTN (hypertension)  Assessment and Plan of Treatment: Low sodium and fat diet, continue anti-hypertensive medications, and follow up with primary care physician.    Diagnosis: DM (diabetes mellitus), type 2  Assessment and Plan of Treatment: Hypoglycemia management: please check your fingerstick every morning or if you are not feeling well. If your FS is >300mg/dl X3 or more readings please contact your PMD/Endocrinologist. If your FS is low <70mg/dl and/or you have symptoms of very low blood sugar FIRST drink1/2 cup of apple juice (or take 4 glucose tab/tube of glucose gel) and recheck FS in 15mins. Repeat these steps until blood sugar is above 100mg/dl, if NECESSARY. Then call your provider to discuss low blood sugar.   What to expend at followup appointment: please bring a log of your fingerstick and/or your glucometer to you appointment. Your blood sugar tracking will help your diabetes team determine the best plan

## 2019-12-31 NOTE — PROGRESS NOTE ADULT - SUBJECTIVE AND OBJECTIVE BOX
Patient denies CP, SOB Review of systems otherwise (-)          MEDICATIONS  (STANDING):  aspirin enteric coated 81 milliGRAM(s) Oral daily  atorvastatin 20 milliGRAM(s) Oral at bedtime  calcitriol   Capsule 0.25 MICROGram(s) Oral daily  calcium acetate 1334 milliGRAM(s) Oral three times a day with meals  carvedilol 12.5 milliGRAM(s) Oral every 12 hours  clopidogrel Tablet 75 milliGRAM(s) Oral daily  dextrose 5%. 1000 milliLiter(s) (50 mL/Hr) IV Continuous <Continuous>  dextrose 50% Injectable 12.5 Gram(s) IV Push once  dextrose 50% Injectable 25 Gram(s) IV Push once  dextrose 50% Injectable 25 Gram(s) IV Push once  DULoxetine 20 milliGRAM(s) Oral two times a day  folic acid 1 milliGRAM(s) Oral daily  insulin glargine Injectable (LANTUS) 18 Unit(s) SubCutaneous at bedtime  insulin lispro (HumaLOG) corrective regimen sliding scale   SubCutaneous three times a day before meals  losartan 25 milliGRAM(s) Oral daily  senna 2 Tablet(s) Oral at bedtime  sodium chloride 0.9% lock flush 3 milliLiter(s) IV Push every 8 hours    MEDICATIONS  (PRN):  dextrose 40% Gel 15 Gram(s) Oral once PRN Blood Glucose LESS THAN 70 milliGRAM(s)/deciliter  glucagon  Injectable 1 milliGRAM(s) IntraMuscular once PRN Glucose LESS THAN 70 milligrams/deciliter  polyethylene glycol 3350 17 Gram(s) Oral two times a day PRN Constipation      LABS:                            12.7   7.30  )-----------( 165      ( 31 Dec 2019 09:15 )             41.9     Hemoglobin: 12.7 g/dL (12-31 @ 09:15)  Hemoglobin: 13.0 g/dL (12-30 @ 07:04)  Hemoglobin: 13.2 g/dL (12-29 @ 06:57)  Hemoglobin: 12.9 g/dL (12-28 @ 06:00)  Hemoglobin: 12.3 g/dL (12-27 @ 07:02)    12-31    135  |  94<L>  |  72<H>  ----------------------------<  131<H>  4.6   |  24  |  9.25<H>    Ca    10.1      31 Dec 2019 09:15  Phos  4.5     12-31  Mg     2.3     12-31      Creatinine Trend: 9.25<--, 8.07<--, 6.18<--, 7.44<--, 5.63<--, 8.48<--           PHYSICAL EXAM  Vital Signs Last 24 Hrs  T(C): 37.1 (31 Dec 2019 09:23), Max: 37.1 (31 Dec 2019 09:23)  T(F): 98.8 (31 Dec 2019 09:23), Max: 98.8 (31 Dec 2019 09:23)  HR: 67 (31 Dec 2019 09:23) (61 - 67)  BP: 117/60 (31 Dec 2019 09:23) (117/60 - 167/57)  BP(mean): --  RR: 16 (31 Dec 2019 09:23) (16 - 18)  SpO2: 99% (31 Dec 2019 09:23) (96% - 100%)    Gen: Left AKA  HEENT:  (-)icterus (-)pallor  CV: N S1 S2 1/6 COCO (+)2 Pulses B/l  Resp:  Clear to ausculatation B/L, normal effort  GI: (+) BS Soft, NT, ND  Lymph:  (-)Edema, (-)obvious lymphadenopathy  Skin: Warm to touch, Normal turgor  Psych: Appropriate mood and affect      ECG:  	Sinus  60s      < from: Transthoracic Echocardiogram (04.04.16 @ 15:01) >  CONCLUSIONS:  1. Normal left ventricular systolic function. No segmental  wall motion abnormalities.  2. Mild diastolic dysfunction (Stage I).  3. Normal right ventricular size and function.    < end of copied text >  RADIOLOGY:             CXR:  No infiltrate       < from: Cardiac Cath Lab - Adult (12.30.19 @ 15:26) >  DIAGNOSTIC RECOMMENDATIONS: Coronary angiogram demonstratesmultivessel  CAD. Will consult CT surgery for surgical opinion. If the patient is not a  candidate for CABG, will consider PCI.  Prepared and signed by  Dom Gonsales M.D.         ASSESSMENT/PLAN: 	74y Female PMhx of HTN, HLD, CAD, s/p PCI to LCX in 2015, recent Ischemia noted on stress in 9/19 cancelled cath due to flu, DM, osteo arthritis and ESRD (Tu,Th,Sat, on Dialysis Left AKA, admitted with fall and hyperglycemia found to have abnormal stress test ( moderate to severe ischemia in the mid anterior, anterolateral and inferolateral walls)  with historically normal LV function on TTE in 2016:       - cath performed 12/30 with CX/LAD/RCA disease - for  PCI today  12/31  - CAD -c/w asa/plavix/statin/coreg/losartan    - HD per renal  - Glycemic control per endo   - will d/w attending regarding the need for repeat TTE  - HD stable no evidence CHF

## 2019-12-31 NOTE — DISCHARGE NOTE PROVIDER - CARE PROVIDER_API CALL
Dom Gonsales (MD)  Cardiovascular Disease; Internal Medicine; Interventional Cardiology  [address] [place] [id number]  Phone: [phone]  Fax: [phone]  Follow Up Time: Dom Gonsales)  Cardiovascular Disease; Internal Medicine; Interventional Cardiology  [address] [place] [address]  Phone: [phone]  Fax: [phone]  Follow Up Time:     Alexander Saenz)  EndocrinologyMetabDiabetes; Internal Medicine  [address] [place] [address]  Phone: [phone]  Fax: [phone]  Follow Up Time:     Sumi Mancini)  Internal Medicine; Nephrology  [facility] [place] [address]  Phone: [phone]  Fax: [phone]  Follow Up Time:

## 2020-01-01 DIAGNOSIS — E11.9 TYPE 2 DIABETES MELLITUS WITHOUT COMPLICATIONS: ICD-10-CM

## 2020-01-01 DIAGNOSIS — R78.81 BACTEREMIA: ICD-10-CM

## 2020-01-01 DIAGNOSIS — I10 ESSENTIAL (PRIMARY) HYPERTENSION: ICD-10-CM

## 2020-01-01 DIAGNOSIS — N18.6 END STAGE RENAL DISEASE: ICD-10-CM

## 2020-01-01 DIAGNOSIS — I25.10 ATHEROSCLEROTIC HEART DISEASE OF NATIVE CORONARY ARTERY WITHOUT ANGINA PECTORIS: ICD-10-CM

## 2020-01-01 LAB
ANION GAP SERPL CALC-SCNC: 15 MMO/L — HIGH (ref 7–14)
BUN SERPL-MCNC: 31 MG/DL — HIGH (ref 7–23)
CALCIUM SERPL-MCNC: 9.8 MG/DL — SIGNIFICANT CHANGE UP (ref 8.4–10.5)
CHLORIDE SERPL-SCNC: 98 MMOL/L — SIGNIFICANT CHANGE UP (ref 98–107)
CO2 SERPL-SCNC: 26 MMOL/L — SIGNIFICANT CHANGE UP (ref 22–31)
CREAT SERPL-MCNC: 5.32 MG/DL — HIGH (ref 0.5–1.3)
GLUCOSE BLDC GLUCOMTR-MCNC: 105 MG/DL — HIGH (ref 70–99)
GLUCOSE BLDC GLUCOMTR-MCNC: 151 MG/DL — HIGH (ref 70–99)
GLUCOSE BLDC GLUCOMTR-MCNC: 189 MG/DL — HIGH (ref 70–99)
GLUCOSE BLDC GLUCOMTR-MCNC: 55 MG/DL — LOW (ref 70–99)
GLUCOSE BLDC GLUCOMTR-MCNC: 57 MG/DL — LOW (ref 70–99)
GLUCOSE BLDC GLUCOMTR-MCNC: 76 MG/DL — SIGNIFICANT CHANGE UP (ref 70–99)
GLUCOSE BLDC GLUCOMTR-MCNC: 99 MG/DL — SIGNIFICANT CHANGE UP (ref 70–99)
GLUCOSE SERPL-MCNC: 51 MG/DL — LOW (ref 70–99)
HCT VFR BLD CALC: 41.2 % — SIGNIFICANT CHANGE UP (ref 34.5–45)
HGB BLD-MCNC: 12.9 G/DL — SIGNIFICANT CHANGE UP (ref 11.5–15.5)
MAGNESIUM SERPL-MCNC: 2.2 MG/DL — SIGNIFICANT CHANGE UP (ref 1.6–2.6)
MCHC RBC-ENTMCNC: 29.1 PG — SIGNIFICANT CHANGE UP (ref 27–34)
MCHC RBC-ENTMCNC: 31.3 % — LOW (ref 32–36)
MCV RBC AUTO: 93 FL — SIGNIFICANT CHANGE UP (ref 80–100)
NRBC # FLD: 0 K/UL — SIGNIFICANT CHANGE UP (ref 0–0)
PHOSPHATE SERPL-MCNC: 4 MG/DL — SIGNIFICANT CHANGE UP (ref 2.5–4.5)
PLATELET # BLD AUTO: 188 K/UL — SIGNIFICANT CHANGE UP (ref 150–400)
PMV BLD: 10.8 FL — SIGNIFICANT CHANGE UP (ref 7–13)
POTASSIUM SERPL-MCNC: 4 MMOL/L — SIGNIFICANT CHANGE UP (ref 3.5–5.3)
POTASSIUM SERPL-SCNC: 4 MMOL/L — SIGNIFICANT CHANGE UP (ref 3.5–5.3)
RBC # BLD: 4.43 M/UL — SIGNIFICANT CHANGE UP (ref 3.8–5.2)
RBC # FLD: 15.7 % — HIGH (ref 10.3–14.5)
SODIUM SERPL-SCNC: 139 MMOL/L — SIGNIFICANT CHANGE UP (ref 135–145)
SPECIMEN SOURCE: SIGNIFICANT CHANGE UP
SPECIMEN SOURCE: SIGNIFICANT CHANGE UP
WBC # BLD: 8.49 K/UL — SIGNIFICANT CHANGE UP (ref 3.8–10.5)
WBC # FLD AUTO: 8.49 K/UL — SIGNIFICANT CHANGE UP (ref 3.8–10.5)

## 2020-01-01 RX ORDER — CHLORHEXIDINE GLUCONATE 213 G/1000ML
1 SOLUTION TOPICAL
Refills: 0 | Status: DISCONTINUED | OUTPATIENT
Start: 2020-01-01 | End: 2020-01-07

## 2020-01-01 RX ADMIN — DULOXETINE HYDROCHLORIDE 20 MILLIGRAM(S): 30 CAPSULE, DELAYED RELEASE ORAL at 18:36

## 2020-01-01 RX ADMIN — SODIUM CHLORIDE 3 MILLILITER(S): 9 INJECTION INTRAMUSCULAR; INTRAVENOUS; SUBCUTANEOUS at 13:20

## 2020-01-01 RX ADMIN — Medication 1: at 13:20

## 2020-01-01 RX ADMIN — Medication 1334 MILLIGRAM(S): at 01:26

## 2020-01-01 RX ADMIN — CLOPIDOGREL BISULFATE 75 MILLIGRAM(S): 75 TABLET, FILM COATED ORAL at 13:19

## 2020-01-01 RX ADMIN — CARVEDILOL PHOSPHATE 12.5 MILLIGRAM(S): 80 CAPSULE, EXTENDED RELEASE ORAL at 13:19

## 2020-01-01 RX ADMIN — LOSARTAN POTASSIUM 25 MILLIGRAM(S): 100 TABLET, FILM COATED ORAL at 06:03

## 2020-01-01 RX ADMIN — CARVEDILOL PHOSPHATE 12.5 MILLIGRAM(S): 80 CAPSULE, EXTENDED RELEASE ORAL at 01:25

## 2020-01-01 RX ADMIN — DULOXETINE HYDROCHLORIDE 20 MILLIGRAM(S): 30 CAPSULE, DELAYED RELEASE ORAL at 06:03

## 2020-01-01 RX ADMIN — SODIUM CHLORIDE 3 MILLILITER(S): 9 INJECTION INTRAMUSCULAR; INTRAVENOUS; SUBCUTANEOUS at 01:29

## 2020-01-01 RX ADMIN — Medication 1334 MILLIGRAM(S): at 09:08

## 2020-01-01 RX ADMIN — SENNA PLUS 2 TABLET(S): 8.6 TABLET ORAL at 21:22

## 2020-01-01 RX ADMIN — Medication 1334 MILLIGRAM(S): at 13:19

## 2020-01-01 RX ADMIN — ATORVASTATIN CALCIUM 20 MILLIGRAM(S): 80 TABLET, FILM COATED ORAL at 21:22

## 2020-01-01 RX ADMIN — SODIUM CHLORIDE 3 MILLILITER(S): 9 INJECTION INTRAMUSCULAR; INTRAVENOUS; SUBCUTANEOUS at 06:01

## 2020-01-01 RX ADMIN — CALCITRIOL 0.25 MICROGRAM(S): 0.5 CAPSULE ORAL at 01:24

## 2020-01-01 RX ADMIN — ATORVASTATIN CALCIUM 20 MILLIGRAM(S): 80 TABLET, FILM COATED ORAL at 01:28

## 2020-01-01 RX ADMIN — Medication 1 MILLIGRAM(S): at 01:25

## 2020-01-01 RX ADMIN — DULOXETINE HYDROCHLORIDE 20 MILLIGRAM(S): 30 CAPSULE, DELAYED RELEASE ORAL at 01:25

## 2020-01-01 RX ADMIN — SODIUM CHLORIDE 3 MILLILITER(S): 9 INJECTION INTRAMUSCULAR; INTRAVENOUS; SUBCUTANEOUS at 21:22

## 2020-01-01 RX ADMIN — Medication 1 MILLIGRAM(S): at 13:19

## 2020-01-01 RX ADMIN — DULOXETINE HYDROCHLORIDE 20 MILLIGRAM(S): 30 CAPSULE, DELAYED RELEASE ORAL at 01:24

## 2020-01-01 RX ADMIN — CALCITRIOL 0.25 MICROGRAM(S): 0.5 CAPSULE ORAL at 13:19

## 2020-01-01 RX ADMIN — Medication 1334 MILLIGRAM(S): at 18:36

## 2020-01-01 RX ADMIN — Medication 81 MILLIGRAM(S): at 13:19

## 2020-01-01 RX ADMIN — SENNA PLUS 2 TABLET(S): 8.6 TABLET ORAL at 01:28

## 2020-01-01 RX ADMIN — CHLORHEXIDINE GLUCONATE 1 APPLICATION(S): 213 SOLUTION TOPICAL at 09:10

## 2020-01-01 NOTE — CONSULT NOTE ADULT - SUBJECTIVE AND OBJECTIVE BOX
HPI:  74 year old female with PMH HTN, HLD, CAD (PCI/stent LCx), PVD, CHF, IDDM, and ESRD (Tu,Th,Sat, on Dialysis last completed on Saturday) and PSH L AKA.  Presented to  ER on 12/26 with c/o high blood glucose and mechanical fall last night, per patient she did not lose consciousness or hit her head. Recent ischemia noted on stress test 9/2019 which was cancelled due to flu, transferred to [facility] today for LHC.  Patient  denies CP/SOB/palpitations dizziness, nausea/vomiting. (30 Dec 2019 13:06)  Patient has history of diabetes, on oral meds and on insulin at home, had recent hypoglycemic episodes, no polyuria polydipsia. Patient follows up with PCP for diabetes management.    PAST MEDICAL & SURGICAL HISTORY:  Bacteremia: diagnosed 2 weeks ago   been treated with IV cefazolin 200 mg on TUES and THUR and 300 mg on SAT, for 21 days, last dose on 5/10/16  Asymmetrical hearing loss, left  Anemia in chronic kidney disease: last PRBC transfusion January 2016  Stented coronary artery  CAD (coronary artery disease): S/P stent  Peripheral vascular disease  Peripheral neuropathy  HTN (hypertension)  Diabetes mellitus: type II,     fingerstick range in the morning 88- 102 mg/dl  Congestive heart failure: last admission8/2015  most recent echo on chart  Hyperlipidemia: dyslipidemia  ESRD (end stage renal disease) on dialysis: patiient on hemodialysis  Femoral-popliteal bypass graft occlusion, left  Arteriovenous fistula: Left  S/P cataract surgery: Bilateral  AVF (arteriovenous fistula): right anterior chest wall (placed May 2015 at [facility])      FAMILY HISTORY:  Family history of diabetes mellitus      Social History:    Outpatient Medications:    MEDICATIONS  (STANDING):  aspirin enteric coated 81 milliGRAM(s) Oral daily  atorvastatin 20 milliGRAM(s) Oral at bedtime  calcitriol   Capsule 0.25 MICROGram(s) Oral daily  calcium acetate 1334 milliGRAM(s) Oral three times a day with meals  carvedilol 12.5 milliGRAM(s) Oral every 12 hours  chlorhexidine 4% Liquid 1 Application(s) Topical <User Schedule>  clopidogrel Tablet 75 milliGRAM(s) Oral daily  dextrose 5%. 1000 milliLiter(s) (50 mL/Hr) IV Continuous <Continuous>  dextrose 50% Injectable 12.5 Gram(s) IV Push once  dextrose 50% Injectable 25 Gram(s) IV Push once  dextrose 50% Injectable 25 Gram(s) IV Push once  DULoxetine 20 milliGRAM(s) Oral two times a day  folic acid 1 milliGRAM(s) Oral daily  insulin lispro (HumaLOG) corrective regimen sliding scale   SubCutaneous three times a day before meals  losartan 25 milliGRAM(s) Oral daily  senna 2 Tablet(s) Oral at bedtime  sodium chloride 0.9% lock flush 3 milliLiter(s) IV Push every 8 hours    MEDICATIONS  (PRN):  dextrose 40% Gel 15 Gram(s) Oral once PRN Blood Glucose LESS THAN 70 milliGRAM(s)/deciliter  glucagon  Injectable 1 milliGRAM(s) IntraMuscular once PRN Glucose LESS THAN 70 milligrams/deciliter  polyethylene glycol 3350 17 Gram(s) Oral two times a day PRN Constipation      Allergies    No Known Allergies    Intolerances      Review of Systems:  Constitutional: No fever, no chills  Eyes: No blurry vision  Neuro: No tremors  HEENT: No pain, no neck swelling  Cardiovascular: No chest pain, no palpitations  Respiratory: No SOB, no cough  GI: No nausea, vomiting, abdominal pain  : No dysuria  Skin: no rash  MSK: Has leg swelling.  Psych: no depression  Endocrine: no polyuria, polydipsia    UNABLE TO OBTAIN    ALL OTHER SYSTEMS REVIEWED AND NEGATIVE        PHYSICAL EXAM:  VITALS: T(C): 37.2 (01-01-20 @ 12:59)  T(F): 98.9 (01-01-20 @ 12:59), Max: 98.9 (01-01-20 @ 12:59)  HR: 63 (01-01-20 @ 12:59) (59 - 65)  BP: 126/51 (01-01-20 @ 12:59) (113/55 - 144/56)  RR:  (16 - 18)  SpO2:  (100% - 100%)  Wt(kg): --  GENERAL: NAD, well-groomed, well-developed  EYES: No proptosis, no lid lag  HEENT:  Atraumatic, Normocephalic  THYROID: Normal size, no palpable nodules  RESPIRATORY: Clear to auscultation bilaterally; No rales, rhonchi, wheezing  CARDIOVASCULAR: Si S2, No murmurs;  GI: Soft, non distended, normal bowel sounds  SKIN: Dry, intact, No rashes or lesions  MUSCULOSKELETAL: Has BL lower extremity edema.  NEURO:  no tremor, sensation decreased in feet BL,    POCT Blood Glucose.: 99 mg/dL (01-01-20 @ 18:32)  POCT Blood Glucose.: 189 mg/dL (01-01-20 @ 12:34)  POCT Blood Glucose.: 105 mg/dL (01-01-20 @ 09:25)  POCT Blood Glucose.: 76 mg/dL (01-01-20 @ 09:05)  POCT Blood Glucose.: 57 mg/dL (01-01-20 @ 08:48)  POCT Blood Glucose.: 55 mg/dL (01-01-20 @ 08:47)  POCT Blood Glucose.: 128 mg/dL (12-31-19 @ 21:59)  POCT Blood Glucose.: 151 mg/dL (12-31-19 @ 14:53)  POCT Blood Glucose.: 148 mg/dL (12-31-19 @ 11:19)  POCT Blood Glucose.: 123 mg/dL (12-31-19 @ 05:41)  POCT Blood Glucose.: 287 mg/dL (12-30-19 @ 22:21)  POCT Blood Glucose.: 227 mg/dL (12-30-19 @ 18:29)  POCT Blood Glucose.: 250 mg/dL (12-30-19 @ 12:21)  POCT Blood Glucose.: 196 mg/dL (12-30-19 @ 07:43)  POCT Blood Glucose.: 198 mg/dL (12-29-19 @ 21:51)                            12.9   8.49  )-----------( 188      ( 01 Jan 2020 09:00 )             41.2       01-01    139  |  98  |  31<H>  ----------------------------<  51<L>  4.0   |  26  |  5.32<H>    EGFR if : 9   EGFR if non : 7     Ca    9.8      01-01  Mg     2.2     01-01  Phos  4.0     01-01        Thyroid Function Tests:  12-27 @ 07:02 TSH 2.20 FreeT4 -- T3 -- Anti TPO -- Anti Thyroglobulin Ab -- TSI --      Hemoglobin A1C, Whole Blood: 9.9 % <H> [4.0 - 5.6] (12-27-19 @ 09:04)      12-27 Chol 132 LDL 34 HDL 61 Trig 184<H>    Radiology:

## 2020-01-01 NOTE — CONSULT NOTE ADULT - ASSESSMENT
Assessment  DMT2: 74y Female with DM T2 with hypoglycemia, was on oral meds and on insulin at home that caused hypoglycemia in the setting of ESRD,  blood sugars improving, has poor PO intake.  Chest pain: Being worked up, monitored, stable.  HTN:  Controlled,  on antihypertensive medications.  ESRD: On hemodialysis, Monitor labs/BMP            Alexander Saenz MD  Cell: [phone]  Office: [phone]

## 2020-01-01 NOTE — CONSULT NOTE ADULT - SUBJECTIVE AND OBJECTIVE BOX
Patient is a 74y old  Female who presents with a chief complaint of Weakness, hyperglycemia (01 Jan 2020 08:11)      HPI:    74 year old female with PMH HTN, HLD, CAD (PCI/stent LCx), PVD, CHF, IDDM, and ESRD (Tu,Th,Sat, on Dialysis last completed on Saturday) and PSH ANIKA HICKS who presented to  ER on 12/26 with c/o high blood glucose and mechanical fall last night, per patient she did not lose consciousness or hit her head. Recent ischemia noted on stress test 9/2019 which was cancelled due to flu, transferred to [facility] for LHC.  Patient  denies CP/SOB/palpitations dizziness, nausea/vomiting. (30 Dec 2019 13:06) Currently asymptomatic and wants to go home       PAST MEDICAL & SURGICAL HISTORY:  Bacteremia: diagnosed 2 weeks ago   been treated with IV cefazolin 200 mg on TUES and THUR and 300 mg on SAT, for 21 days, last dose on 5/10/16  Asymmetrical hearing loss, left  Anemia in chronic kidney disease: last PRBC transfusion January 2016  Stented coronary artery  CAD (coronary artery disease): S/P stent  Peripheral vascular disease  Peripheral neuropathy  HTN (hypertension)  Diabetes mellitus: type II,     fingerstick range in the morning 88- 102 mg/dl  Congestive heart failure: last admission8/2015  most recent echo on chart  Hyperlipidemia: dyslipidemia  ESRD (end stage renal disease) on dialysis: patient on hemodialysis  Femoral-popliteal bypass graft occlusion, left  Arteriovenous fistula: Left  S/P cataract surgery: Bilateral  AVF (arteriovenous fistula): right anterior chest wall (placed May 2015 at [facility])      Review of Systems:   CONSTITUTIONAL: No fever, weight loss, or fatigue  EYES: No eye pain, visual disturbances, or discharge  ENMT:  No difficulty hearing, tinnitus, vertigo; No sinus or throat pain  NECK: No pain or stiffness  RESPIRATORY: No cough, wheezing, chills or hemoptysis; No shortness of breath  CARDIOVASCULAR: No chest pain, palpitations, dizziness, or leg swelling  GASTROINTESTINAL: No abdominal or epigastric pain. No nausea, vomiting, or hematemesis; No diarrhea or constipation  GENITOURINARY: No dysuria, frequency, hematuria, or incontinence  NEUROLOGICAL: No headaches, memory loss, loss of strength, numbness, or tremors  SKIN: No itching, burning, rashes, or lesions   LYMPH NODES: No enlarged glands  ENDOCRINE: No heat or cold intolerance; No hair loss  MUSCULOSKELETAL: No joint pain or swelling; No muscle, back, or extremity pain  PSYCHIATRIC: No depression, anxiety, mood swings, or difficulty sleeping  HEME/LYMPH: No easy bruising, or bleeding gums  ALLERGY AND IMMUNOLOGIC: No hives or eczema    Allergies    No Known Allergies    Intolerances        Social History: non smoker  no IVDA  no ETOH abuse   lives with family     FAMILY HISTORY:  Family history of diabetes mellitus      MEDICATIONS  (STANDING):  aspirin enteric coated 81 milliGRAM(s) Oral daily  atorvastatin 20 milliGRAM(s) Oral at bedtime  calcitriol   Capsule 0.25 MICROGram(s) Oral daily  calcium acetate 1334 milliGRAM(s) Oral three times a day with meals  carvedilol 12.5 milliGRAM(s) Oral every 12 hours  chlorhexidine 4% Liquid 1 Application(s) Topical <User Schedule>  clopidogrel Tablet 75 milliGRAM(s) Oral daily  dextrose 5%. 1000 milliLiter(s) (50 mL/Hr) IV Continuous <Continuous>  dextrose 50% Injectable 12.5 Gram(s) IV Push once  dextrose 50% Injectable 25 Gram(s) IV Push once  dextrose 50% Injectable 25 Gram(s) IV Push once  DULoxetine 20 milliGRAM(s) Oral two times a day  folic acid 1 milliGRAM(s) Oral daily  insulin glargine Injectable (LANTUS) 18 Unit(s) SubCutaneous at bedtime  insulin lispro (HumaLOG) corrective regimen sliding scale   SubCutaneous three times a day before meals  losartan 25 milliGRAM(s) Oral daily  senna 2 Tablet(s) Oral at bedtime  sodium chloride 0.9% lock flush 3 milliLiter(s) IV Push every 8 hours    MEDICATIONS  (PRN):  dextrose 40% Gel 15 Gram(s) Oral once PRN Blood Glucose LESS THAN 70 milliGRAM(s)/deciliter  glucagon  Injectable 1 milliGRAM(s) IntraMuscular once PRN Glucose LESS THAN 70 milligrams/deciliter  polyethylene glycol 3350 17 Gram(s) Oral two times a day PRN Constipation      CAPILLARY BLOOD GLUCOSE      POCT Blood Glucose.: 189 mg/dL (01 Jan 2020 12:34)  POCT Blood Glucose.: 105 mg/dL (01 Jan 2020 09:25)  POCT Blood Glucose.: 76 mg/dL (01 Jan 2020 09:05)  POCT Blood Glucose.: 57 mg/dL (01 Jan 2020 08:48)  POCT Blood Glucose.: 55 mg/dL (01 Jan 2020 08:47)  POCT Blood Glucose.: 128 mg/dL (31 Dec 2019 21:59)    I&O's Summary    31 Dec 2019 07:01  -  01 Jan 2020 07:00  --------------------------------------------------------  IN: 400 mL / OUT: 900 mL / NET: -500 mL        24hrs Vital:  T(C): 37.2 (01-01-20 @ 12:59), Max: 37.2 (01-01-20 @ 12:59)  HR: 63 (01-01-20 @ 12:59) (59 - 65)  BP: 126/51 (01-01-20 @ 12:59) (113/55 - 144/56)  RR: 16 (01-01-20 @ 12:59) (16 - 18)  SpO2: 100% (01-01-20 @ 12:59) (98% - 100%)    PHYSICAL EXAM:  GENERAL: NAD, well-developed  HEAD:  Atraumatic, Normocephalic  EYES: EOMI, PERRLA, conjunctiva and sclera clear  NECK: Supple, No JVD  CHEST/LUNG: Clear to auscultation bilaterally; No wheeze  HEART: S1S2; holosystolic soft murmur best heard at left sternal border no rub no gallop   ABDOMEN: Soft, Nontender, Nondistended; Bowel sounds present  EXTREMITIES:  + Peripheral Pulses, No clubbing or cyanosis, no edema right leg  left BKA  PSYCH: AO x 3,   NEUROLOGY: Alert, no focal motor or sensory deficits  SKIN: No rashes or lesions    LABS:                        12.9   8.49  )-----------( 188      ( 01 Jan 2020 09:00 )             41.2     01-01    139  |  98  |  31<H>  ----------------------------<  51<L>  4.0   |  26  |  5.32<H>    Ca    9.8      01 Jan 2020 09:00  Phos  4.0     01-01  Mg     2.2     01-01                RADIOLOGY & ADDITIONAL TESTS:    Consultant(s) Notes Reviewed:      Care Discussed with Consultants/Other Providers:

## 2020-01-01 NOTE — CONSULT NOTE ADULT - ASSESSMENT
74 year old female with PMH HTN, HLD, CAD (PCI/stent LCx), PVD, CHF, IDDM, and ESRD (Tu,Th,Sat, on Dialysis last completed on Saturday) and PSH ANIKA HICKS who presented to  ER on 12/26 with c/o high blood glucose and mechanical fall transferred to [facility] for coronary angiogram

## 2020-01-01 NOTE — PROGRESS NOTE ADULT - SUBJECTIVE AND OBJECTIVE BOX
Patient denies CP, SOB Review of systems otherwise (-)         aspirin enteric coated 81 milliGRAM(s) Oral daily  atorvastatin 20 milliGRAM(s) Oral at bedtime  calcitriol   Capsule 0.25 MICROGram(s) Oral daily  calcium acetate 1334 milliGRAM(s) Oral three times a day with meals  carvedilol 12.5 milliGRAM(s) Oral every 12 hours  chlorhexidine 4% Liquid 1 Application(s) Topical <User Schedule>  clopidogrel Tablet 75 milliGRAM(s) Oral daily  dextrose 40% Gel 15 Gram(s) Oral once PRN  dextrose 5%. 1000 milliLiter(s) IV Continuous <Continuous>  dextrose 50% Injectable 12.5 Gram(s) IV Push once  dextrose 50% Injectable 25 Gram(s) IV Push once  dextrose 50% Injectable 25 Gram(s) IV Push once  DULoxetine 20 milliGRAM(s) Oral two times a day  folic acid 1 milliGRAM(s) Oral daily  glucagon  Injectable 1 milliGRAM(s) IntraMuscular once PRN  insulin glargine Injectable (LANTUS) 18 Unit(s) SubCutaneous at bedtime  insulin lispro (HumaLOG) corrective regimen sliding scale   SubCutaneous three times a day before meals  losartan 25 milliGRAM(s) Oral daily  polyethylene glycol 3350 17 Gram(s) Oral two times a day PRN  senna 2 Tablet(s) Oral at bedtime  sodium chloride 0.9% lock flush 3 milliLiter(s) IV Push every 8 hours                            12.7   7.30  )-----------( 165      ( 31 Dec 2019 09:15 )             41.9       Hemoglobin: 12.7 g/dL (12-31 @ 09:15)  Hemoglobin: 13.0 g/dL (12-30 @ 07:04)  Hemoglobin: 13.2 g/dL (12-29 @ 06:57)  Hemoglobin: 12.9 g/dL (12-28 @ 06:00)      12-31    135  |  94<L>  |  72<H>  ----------------------------<  131<H>  4.6   |  24  |  9.25<H>    Ca    10.1      31 Dec 2019 09:15  Phos  4.5     12-31  Mg     2.3     12-31      Creatinine Trend: 9.25<--, 8.07<--, 6.18<--, 7.44<--, 5.63<--, 8.48<--    COAGS:           T(C): 36.4 (01-01-20 @ 05:59), Max: 37.1 (12-31-19 @ 09:23)  HR: 59 (01-01-20 @ 05:59) (59 - 67)  BP: 143/55 (01-01-20 @ 05:59) (113/55 - 144/56)  RR: 17 (01-01-20 @ 05:59) (16 - 18)  SpO2: 100% (01-01-20 @ 05:59) (98% - 100%)  Wt(kg): --    I&O's Summary    31 Dec 2019 07:01  -  01 Jan 2020 07:00  --------------------------------------------------------  IN: 400 mL / OUT: 900 mL / NET: -500 mL      Gen: Left AKA  HEENT:  (-)icterus (-)pallor  CV: N S1 S2 1/6 COCO (+)2 Pulses B/l  Resp:  Clear to ausculatation B/L, normal effort  GI: (+) BS Soft, NT, ND  Lymph:  (-)Edema, (-)obvious lymphadenopathy  Skin: Warm to touch, Normal turgor  Psych: Appropriate mood and affect      ECG:  	Sinus  60s      < from: Transthoracic Echocardiogram (04.04.16 @ 15:01) >  CONCLUSIONS:  1. Normal left ventricular systolic function. No segmental  wall motion abnormalities.  2. Mild diastolic dysfunction (Stage I).  3. Normal right ventricular size and function.    < end of copied text >  RADIOLOGY:             CXR:  No infiltrate       < from: Cardiac Cath Lab - Adult (12.30.19 @ 15:26) >  DIAGNOSTIC RECOMMENDATIONS: Coronary angiogram demonstratesmultivessel  CAD. Will consult CT surgery for surgical opinion. If the patient is not a  candidate for CABG, will consider PCI.  Prepared and signed by  Dom Gonsales M.D.         ASSESSMENT/PLAN: 	74y Female PMhx of HTN, HLD, CAD, s/p PCI to LCX in 2015, recent Ischemia noted on stress in 9/19 cancelled cath due to flu, DM, osteo arthritis and ESRD (Tu,Th,Sat, on Dialysis Left AKA, admitted with fall and hyperglycemia found to have abnormal stress test ( moderate to severe ischemia in the mid anterior, anterolateral and inferolateral walls)  with historically normal LV function on TTE in 2016:    12/31 s/p LHC via RFA LCx 95% RICKEY x1    - cath performed 12/30 with CX/LAD/RCA disease , s/p RICKEY to LCX   - CAD -c/w asa/plavix/statin/coreg/losartan    - HD per renal  - Glycemic control per endo   - HD stable no evidence CHF  D/C planning   D/W Dr Gonsales

## 2020-01-01 NOTE — CONSULT NOTE ADULT - PROBLEM SELECTOR RECOMMENDATION 9
Will continue current insulin regimen for now. Will continue monitoring FS, log, will Follow up.  Will reassess her home DM meds before DC, will FU  Patient counseled for compliance with consistent low carb diet, exercise as tolerated as out patient.

## 2020-01-01 NOTE — CONSULT NOTE ADULT - PROBLEM SELECTOR RECOMMENDATION 5
work up noted  defer management to cardiology attending  outpatient observation at this time per cardiology attending

## 2020-01-02 LAB
ANION GAP SERPL CALC-SCNC: 13 MMO/L — SIGNIFICANT CHANGE UP (ref 7–14)
ANION GAP SERPL CALC-SCNC: 13 MMO/L — SIGNIFICANT CHANGE UP (ref 7–14)
BUN SERPL-MCNC: 44 MG/DL — HIGH (ref 7–23)
BUN SERPL-MCNC: 44 MG/DL — HIGH (ref 7–23)
CALCIUM SERPL-MCNC: 9.7 MG/DL — SIGNIFICANT CHANGE UP (ref 8.4–10.5)
CALCIUM SERPL-MCNC: 9.9 MG/DL — SIGNIFICANT CHANGE UP (ref 8.4–10.5)
CHLORIDE SERPL-SCNC: 90 MMOL/L — LOW (ref 98–107)
CHLORIDE SERPL-SCNC: 91 MMOL/L — LOW (ref 98–107)
CO2 SERPL-SCNC: 22 MMOL/L — SIGNIFICANT CHANGE UP (ref 22–31)
CO2 SERPL-SCNC: 26 MMOL/L — SIGNIFICANT CHANGE UP (ref 22–31)
CREAT SERPL-MCNC: 6.41 MG/DL — HIGH (ref 0.5–1.3)
CREAT SERPL-MCNC: 6.49 MG/DL — HIGH (ref 0.5–1.3)
GLUCOSE BLDC GLUCOMTR-MCNC: 122 MG/DL — HIGH (ref 70–99)
GLUCOSE BLDC GLUCOMTR-MCNC: 146 MG/DL — HIGH (ref 70–99)
GLUCOSE BLDC GLUCOMTR-MCNC: 175 MG/DL — HIGH (ref 70–99)
GLUCOSE BLDC GLUCOMTR-MCNC: 239 MG/DL — HIGH (ref 70–99)
GLUCOSE BLDC GLUCOMTR-MCNC: 98 MG/DL — SIGNIFICANT CHANGE UP (ref 70–99)
GLUCOSE SERPL-MCNC: 143 MG/DL — HIGH (ref 70–99)
GLUCOSE SERPL-MCNC: 299 MG/DL — HIGH (ref 70–99)
HCT VFR BLD CALC: 39.6 % — SIGNIFICANT CHANGE UP (ref 34.5–45)
HGB BLD-MCNC: 12.4 G/DL — SIGNIFICANT CHANGE UP (ref 11.5–15.5)
MAGNESIUM SERPL-MCNC: 2.2 MG/DL — SIGNIFICANT CHANGE UP (ref 1.6–2.6)
MCHC RBC-ENTMCNC: 29.6 PG — SIGNIFICANT CHANGE UP (ref 27–34)
MCHC RBC-ENTMCNC: 31.3 % — LOW (ref 32–36)
MCV RBC AUTO: 94.5 FL — SIGNIFICANT CHANGE UP (ref 80–100)
NRBC # FLD: 0 K/UL — SIGNIFICANT CHANGE UP (ref 0–0)
PHOSPHATE SERPL-MCNC: 4.8 MG/DL — HIGH (ref 2.5–4.5)
PLATELET # BLD AUTO: 168 K/UL — SIGNIFICANT CHANGE UP (ref 150–400)
PMV BLD: 11.4 FL — SIGNIFICANT CHANGE UP (ref 7–13)
POTASSIUM SERPL-MCNC: 5.1 MMOL/L — SIGNIFICANT CHANGE UP (ref 3.5–5.3)
POTASSIUM SERPL-MCNC: SIGNIFICANT CHANGE UP MMOL/L (ref 3.5–5.3)
POTASSIUM SERPL-SCNC: 5.1 MMOL/L — SIGNIFICANT CHANGE UP (ref 3.5–5.3)
POTASSIUM SERPL-SCNC: SIGNIFICANT CHANGE UP MMOL/L (ref 3.5–5.3)
RBC # BLD: 4.19 M/UL — SIGNIFICANT CHANGE UP (ref 3.8–5.2)
RBC # FLD: 15.9 % — HIGH (ref 10.3–14.5)
SODIUM SERPL-SCNC: 126 MMOL/L — LOW (ref 135–145)
SODIUM SERPL-SCNC: 129 MMOL/L — LOW (ref 135–145)
WBC # BLD: 6.68 K/UL — SIGNIFICANT CHANGE UP (ref 3.8–10.5)
WBC # FLD AUTO: 6.68 K/UL — SIGNIFICANT CHANGE UP (ref 3.8–10.5)

## 2020-01-02 RX ORDER — ACETAMINOPHEN 500 MG
650 TABLET ORAL EVERY 6 HOURS
Refills: 0 | Status: DISCONTINUED | OUTPATIENT
Start: 2020-01-02 | End: 2020-01-07

## 2020-01-02 RX ADMIN — DULOXETINE HYDROCHLORIDE 20 MILLIGRAM(S): 30 CAPSULE, DELAYED RELEASE ORAL at 05:18

## 2020-01-02 RX ADMIN — SODIUM CHLORIDE 3 MILLILITER(S): 9 INJECTION INTRAMUSCULAR; INTRAVENOUS; SUBCUTANEOUS at 05:17

## 2020-01-02 RX ADMIN — Medication 1334 MILLIGRAM(S): at 18:31

## 2020-01-02 RX ADMIN — CHLORHEXIDINE GLUCONATE 1 APPLICATION(S): 213 SOLUTION TOPICAL at 15:19

## 2020-01-02 RX ADMIN — Medication 650 MILLIGRAM(S): at 00:23

## 2020-01-02 RX ADMIN — Medication 1 MILLIGRAM(S): at 18:31

## 2020-01-02 RX ADMIN — CALCITRIOL 0.25 MICROGRAM(S): 0.5 CAPSULE ORAL at 18:30

## 2020-01-02 RX ADMIN — Medication 650 MILLIGRAM(S): at 00:53

## 2020-01-02 RX ADMIN — SENNA PLUS 2 TABLET(S): 8.6 TABLET ORAL at 22:35

## 2020-01-02 RX ADMIN — Medication 2: at 18:28

## 2020-01-02 RX ADMIN — SODIUM CHLORIDE 3 MILLILITER(S): 9 INJECTION INTRAMUSCULAR; INTRAVENOUS; SUBCUTANEOUS at 22:35

## 2020-01-02 RX ADMIN — CARVEDILOL PHOSPHATE 12.5 MILLIGRAM(S): 80 CAPSULE, EXTENDED RELEASE ORAL at 00:02

## 2020-01-02 RX ADMIN — DULOXETINE HYDROCHLORIDE 20 MILLIGRAM(S): 30 CAPSULE, DELAYED RELEASE ORAL at 18:32

## 2020-01-02 RX ADMIN — SODIUM CHLORIDE 3 MILLILITER(S): 9 INJECTION INTRAMUSCULAR; INTRAVENOUS; SUBCUTANEOUS at 15:19

## 2020-01-02 RX ADMIN — Medication 1334 MILLIGRAM(S): at 08:45

## 2020-01-02 RX ADMIN — Medication 81 MILLIGRAM(S): at 18:30

## 2020-01-02 RX ADMIN — ATORVASTATIN CALCIUM 20 MILLIGRAM(S): 80 TABLET, FILM COATED ORAL at 22:35

## 2020-01-02 RX ADMIN — CLOPIDOGREL BISULFATE 75 MILLIGRAM(S): 75 TABLET, FILM COATED ORAL at 18:30

## 2020-01-02 RX ADMIN — CARVEDILOL PHOSPHATE 12.5 MILLIGRAM(S): 80 CAPSULE, EXTENDED RELEASE ORAL at 18:31

## 2020-01-02 NOTE — PHYSICAL THERAPY INITIAL EVALUATION ADULT - RANGE OF MOTION EXAMINATION, REHAB EVAL
left LE above knee amputation/bilateral upper extremity ROM was WFL (within functional limits)/bilateral lower extremity ROM was WFL (within functional limits)

## 2020-01-02 NOTE — PHYSICAL THERAPY INITIAL EVALUATION ADULT - ADDITIONAL COMMENTS
Pt lives with in an apartment with her sister.  5 steps to enter, requires total assist to negotiate. Pt has home health aide 3 hours on tuesday, thursday and saturday secondary to dialysis treatments. On Monday wednesday, friday and sunday, pt has home health aide for 5 hours. +commode

## 2020-01-02 NOTE — PROGRESS NOTE ADULT - ASSESSMENT
Assessment  DMT2: 74y Female with DM T2 with hypoglycemia, was on oral meds and on insulin at home that caused hypoglycemia in the setting of ESRD,  now on insulin correction scale coverage only, blood sugars improved, has poor PO intake.  Chest pain: Being worked up, monitored, stable.  HTN:  Controlled,  on antihypertensive medications.  ESRD: On hemodialysis, Monitor labs/BMP            Alexander Saenz MD  Cell: [phone]  Office: [phone]

## 2020-01-02 NOTE — PROGRESS NOTE ADULT - ASSESSMENT
ESRD ON HD,  outpt TTS  WAS last  DIALYZED  12/31    K ok  hyponatremia ?dilutional  HTN, controlled. BP  ACCEPTABLE  CAD s/p PCI w/LCx stent  DM- mx per primary team    labs, chart reviewed  scheduled for HD today w/2k bath, no heparin, inc uf 1.5-2kg as tolearted  no EPO AS  HB above goal.   c/w BB, ARB  c/w renal restrictions in diet  stable for d/c renal stand point back to rehab after hd today

## 2020-01-02 NOTE — PROGRESS NOTE ADULT - SUBJECTIVE AND OBJECTIVE BOX
Patient denies CP, SOB Review of systems otherwise (-)         MEDICATIONS  (STANDING):  aspirin enteric coated 81 milliGRAM(s) Oral daily  atorvastatin 20 milliGRAM(s) Oral at bedtime  calcitriol   Capsule 0.25 MICROGram(s) Oral daily  calcium acetate 1334 milliGRAM(s) Oral three times a day with meals  carvedilol 12.5 milliGRAM(s) Oral every 12 hours  chlorhexidine 4% Liquid 1 Application(s) Topical <User Schedule>  clopidogrel Tablet 75 milliGRAM(s) Oral daily  DULoxetine 20 milliGRAM(s) Oral two times a day  folic acid 1 milliGRAM(s) Oral daily  insulin lispro (HumaLOG) corrective regimen sliding scale   SubCutaneous three times a day before meals  losartan 25 milliGRAM(s) Oral daily  senna 2 Tablet(s) Oral at bedtime  sodium chloride 0.9% lock flush 3 milliLiter(s) IV Push every 8 hours    MEDICATIONS  (PRN):  acetaminophen   Tablet .. 650 milliGRAM(s) Oral every 6 hours PRN Mild Pain (1 - 3), Moderate Pain (4 - 6), Severe Pain (7 - 10)  dextrose 40% Gel 15 Gram(s) Oral once PRN Blood Glucose LESS THAN 70 milliGRAM(s)/deciliter  glucagon  Injectable 1 milliGRAM(s) IntraMuscular once PRN Glucose LESS THAN 70 milligrams/deciliter  polyethylene glycol 3350 17 Gram(s) Oral two times a day PRN Constipation      LABS:                            12.4   6.68  )-----------( 168      ( 02 Jan 2020 06:25 )             39.6     Hemoglobin: 12.4 g/dL (01-02 @ 06:25)  Hemoglobin: 12.9 g/dL (01-01 @ 09:00)  Hemoglobin: 12.7 g/dL (12-31 @ 09:15)  Hemoglobin: 13.0 g/dL (12-30 @ 07:04)  Hemoglobin: 13.2 g/dL (12-29 @ 06:57)    01-02    129<L>  |  90<L>  |  44<H>  ----------------------------<  299<H>  5.1   |  26  |  6.41<H>    Ca    9.7      02 Jan 2020 12:10  Phos  4.8     01-02  Mg     2.2     01-02      Creatinine Trend: 6.41<--, 6.49<--, 5.32<--, 9.25<--, 8.07<--, 6.18<--           PHYSICAL EXAM  Vital Signs Last 24 Hrs  T(C): 37 (02 Jan 2020 12:02), Max: 37 (02 Jan 2020 12:02)  T(F): 98.6 (02 Jan 2020 12:02), Max: 98.6 (02 Jan 2020 12:02)  HR: 65 (02 Jan 2020 12:02) (60 - 65)  BP: 143/54 (02 Jan 2020 12:02) (122/45 - 143/55)  BP(mean): --  RR: 18 (02 Jan 2020 12:02) (16 - 18)  SpO2: 96% (02 Jan 2020 05:16) (96% - 97%)      Gen: NAD well developed well nourished   HEENT:  (-)icterus (-)pallor  CV: N S1 S2 1/6 COCO (+)2 Pulses B/l  Resp:  Clear to ausculatation B/L, normal effort  GI: (+) BS Soft, NT, ND  Lymph:  (-)Edema, (-)obvious lymphadenopathy, L aka  Skin: Warm to touch, Normal turgor  Psych: Appropriate mood and affect      ECG:  	SB 50s      < from: Transthoracic Echocardiogram (04.04.16 @ 15:01) >  CONCLUSIONS:  1. Normal left ventricular systolic function. No segmental  wall motion abnormalities.  2. Mild diastolic dysfunction (Stage I).  3. Normal right ventricular size and function.    < end of copied text >  RADIOLOGY:             CXR:  No infiltrate       < from: Cardiac Cath Lab - Adult (12.30.19 @ 15:26) >  DIAGNOSTIC RECOMMENDATIONS: Coronary angiogram demonstratesmultivessel  CAD. Will consult CT surgery for surgical opinion. If the patient is not a  candidate for CABG, will consider PCI.  Prepared and signed by  Dom Gonsales M.D.         ASSESSMENT/PLAN: 	74y Female PMhx of HTN, HLD, CAD, s/p PCI to LCX in 2015, recent Ischemia noted on stress in 9/19 cancelled cath due to flu, DM, osteo arthritis and ESRD (Tu,Th,Sat, on Dialysis Left AKA, admitted with fall and hyperglycemia found to have abnormal stress test ( moderate to severe ischemia in the mid anterior, anterolateral and inferolateral walls)  with historically normal LV function on TTE in 2016:    12/31 s/p LHC via RFA LCx 95% RICKEY x1    - CAD - cath performed 12/30 with CX/LAD/RCA disease , s/p RICKEY to LCX             - non-obstructive cad in LAD with DEONTE 3 flow; culprit lesion was the 95% proximal cx lesion; therefore will medically manage LAD stenosis for now; can consider staged PCI in the future if clinically indicated  -c/w asa/plavix/statin/coreg/losartan    - HD per renal -hyponatremia noted - no objection to dc from renal perspective   - Glycemic control per endo   - HD stable no evidence CHF  - follow up with Dr Odom 1/13 @ 1045am for cardio Patient denies CP, SOB Review of systems otherwise (-)         MEDICATIONS  (STANDING):  aspirin enteric coated 81 milliGRAM(s) Oral daily  atorvastatin 20 milliGRAM(s) Oral at bedtime  calcitriol   Capsule 0.25 MICROGram(s) Oral daily  calcium acetate 1334 milliGRAM(s) Oral three times a day with meals  carvedilol 12.5 milliGRAM(s) Oral every 12 hours  chlorhexidine 4% Liquid 1 Application(s) Topical <User Schedule>  clopidogrel Tablet 75 milliGRAM(s) Oral daily  DULoxetine 20 milliGRAM(s) Oral two times a day  folic acid 1 milliGRAM(s) Oral daily  insulin lispro (HumaLOG) corrective regimen sliding scale   SubCutaneous three times a day before meals  losartan 25 milliGRAM(s) Oral daily  senna 2 Tablet(s) Oral at bedtime  sodium chloride 0.9% lock flush 3 milliLiter(s) IV Push every 8 hours    MEDICATIONS  (PRN):  acetaminophen   Tablet .. 650 milliGRAM(s) Oral every 6 hours PRN Mild Pain (1 - 3), Moderate Pain (4 - 6), Severe Pain (7 - 10)  dextrose 40% Gel 15 Gram(s) Oral once PRN Blood Glucose LESS THAN 70 milliGRAM(s)/deciliter  glucagon  Injectable 1 milliGRAM(s) IntraMuscular once PRN Glucose LESS THAN 70 milligrams/deciliter  polyethylene glycol 3350 17 Gram(s) Oral two times a day PRN Constipation      LABS:                            12.4   6.68  )-----------( 168      ( 02 Jan 2020 06:25 )             39.6     Hemoglobin: 12.4 g/dL (01-02 @ 06:25)  Hemoglobin: 12.9 g/dL (01-01 @ 09:00)  Hemoglobin: 12.7 g/dL (12-31 @ 09:15)  Hemoglobin: 13.0 g/dL (12-30 @ 07:04)  Hemoglobin: 13.2 g/dL (12-29 @ 06:57)    01-02    129<L>  |  90<L>  |  44<H>  ----------------------------<  299<H>  5.1   |  26  |  6.41<H>    Ca    9.7      02 Jan 2020 12:10  Phos  4.8     01-02  Mg     2.2     01-02      Creatinine Trend: 6.41<--, 6.49<--, 5.32<--, 9.25<--, 8.07<--, 6.18<--           PHYSICAL EXAM  Vital Signs Last 24 Hrs  T(C): 37 (02 Jan 2020 12:02), Max: 37 (02 Jan 2020 12:02)  T(F): 98.6 (02 Jan 2020 12:02), Max: 98.6 (02 Jan 2020 12:02)  HR: 65 (02 Jan 2020 12:02) (60 - 65)  BP: 143/54 (02 Jan 2020 12:02) (122/45 - 143/55)  BP(mean): --  RR: 18 (02 Jan 2020 12:02) (16 - 18)  SpO2: 96% (02 Jan 2020 05:16) (96% - 97%)      Gen: NAD well developed well nourished   HEENT:  (-)icterus (-)pallor  CV: N S1 S2 1/6 COCO (+)2 Pulses B/l  Resp:  Clear to ausculatation B/L, normal effort  GI: (+) BS Soft, NT, ND  Lymph:  (-)Edema, (-)obvious lymphadenopathy, L aka  Skin: Warm to touch, Normal turgor  Psych: Appropriate mood and affect      ECG:  	SB 50s      < from: Transthoracic Echocardiogram (04.04.16 @ 15:01) >  CONCLUSIONS:  1. Normal left ventricular systolic function. No segmental  wall motion abnormalities.  2. Mild diastolic dysfunction (Stage I).  3. Normal right ventricular size and function.    < end of copied text >  RADIOLOGY:             CXR:  No infiltrate       < from: Cardiac Cath Lab - Adult (12.30.19 @ 15:26) >  DIAGNOSTIC RECOMMENDATIONS: Coronary angiogram demonstratesmultivessel  CAD. Will consult CT surgery for surgical opinion. If the patient is not a  candidate for CABG, will consider PCI.  Prepared and signed by  Dom Gonsales M.D.         ASSESSMENT/PLAN: 	74y Female PMhx of HTN, HLD, CAD, s/p PCI to LCX in 2015, recent Ischemia noted on stress in 9/19 cancelled cath due to flu, DM, osteo arthritis and ESRD (Tu,Th,Sat, on Dialysis Left AKA, admitted with fall and hyperglycemia found to have abnormal stress test ( moderate to severe ischemia in the mid anterior, anterolateral and inferolateral walls)  with historically normal LV function on TTE in 2016:    12/31 s/p LHC via RFA LCx 95% RICKEY x1    - CAD - cath performed 12/30 with CX/LAD/RCA disease , s/p RICKEY to LCX             - non-obstructive cad in LAD with DEONTE 3 flow; culprit lesion was the 95% proximal cx lesion; therefore will medically manage LAD stenosis for now; can consider staged PCI in the future if clinically indicated  -c/w asa/plavix/statin/coreg/losartan    - HD per renal -hyponatremia noted - no objection to dc from renal perspective   - Glycemic control per endo   - HD stable no evidence CHF  - dc planning in progress   - pending PT eval with prosthetic leg which family is bringing in   - follow up with Dr Odom 1/13 @ 1045am for cardio

## 2020-01-02 NOTE — PROGRESS NOTE ADULT - SUBJECTIVE AND OBJECTIVE BOX
[facility] - Addis / Ximena S /Abhinav/ TYLER Saenz/ TYLER Russell/ Harjit Arriaza / KIERSTEN Njeru  ---------------------------------------------------------------------------------------------------------------    Patient seen and examined bedside, earlier today    Subjective and Objective: No overnight events, denied cp/ sob. No complaints today. feeling better    Allergies: No Known Allergies      Hospital Medications:   MEDICATIONS  (STANDING):  aspirin enteric coated 81 milliGRAM(s) Oral daily  atorvastatin 20 milliGRAM(s) Oral at bedtime  calcitriol   Capsule 0.25 MICROGram(s) Oral daily  calcium acetate 1334 milliGRAM(s) Oral three times a day with meals  carvedilol 12.5 milliGRAM(s) Oral every 12 hours  chlorhexidine 4% Liquid 1 Application(s) Topical <User Schedule>  clopidogrel Tablet 75 milliGRAM(s) Oral daily  dextrose 5%. 1000 milliLiter(s) (50 mL/Hr) IV Continuous <Continuous>  dextrose 50% Injectable 12.5 Gram(s) IV Push once  dextrose 50% Injectable 25 Gram(s) IV Push once  dextrose 50% Injectable 25 Gram(s) IV Push once  DULoxetine 20 milliGRAM(s) Oral two times a day  folic acid 1 milliGRAM(s) Oral daily  insulin lispro (HumaLOG) corrective regimen sliding scale   SubCutaneous three times a day before meals  losartan 25 milliGRAM(s) Oral daily  senna 2 Tablet(s) Oral at bedtime  sodium chloride 0.9% lock flush 3 milliLiter(s) IV Push every 8 hours        VITALS:  T(F): 98.6 (01-02-20 @ 12:02), Max: 98.6 (01-02-20 @ 12:02)  HR: 65 (01-02-20 @ 12:02)  BP: 143/54 (01-02-20 @ 12:02)  RR: 18 (01-02-20 @ 12:02)  SpO2: 96% (01-02-20 @ 05:16)  Wt(kg): --      PHYSICAL EXAM:  Constitutional: NAD  HEENT: anicteric sclera, oropharynx clear  Neck: No JVD  Respiratory: CTAB, no wheezes, rales or rhonchi  Cardiovascular: S1, S2, RRR  Gastrointestinal: BS+, soft, NT/ND  Extremities: No cyanosis or clubbing. No peripheral edema  Neurological: A/O x 3, no focal deficits  Psychiatric: Normal mood, normal affect  : No kemp.   Vascular Access: chest AVG+    LABS:  01-02    129<L>  |  90<L>  |  44<H>  ----------------------------<  299<H>  5.1   |  26  |  6.41<H>    Ca    9.7      02 Jan 2020 12:10  Phos  4.8     01-02  Mg     2.2     01-02      Creatinine Trend: 6.41 <--, 6.49 <--, 5.32 <--, 9.25 <--, 8.07 <--, 6.18 <--, 7.44 <--, 5.63 <--                        12.4   6.68  )-----------( 168      ( 02 Jan 2020 06:25 )             39.6     Urine Studies:        RADIOLOGY & ADDITIONAL STUDIES:

## 2020-01-02 NOTE — PROGRESS NOTE ADULT - SUBJECTIVE AND OBJECTIVE BOX
Chief complaint  Patient is a 74y old  Female who presents with a chief complaint of Weakness, hyperglycemia (02 Jan 2020 15:13)   Review of systems  Patient in bed, looks comfortable, no fever, no hypoglycemia.    Labs and Fingersticks  CAPILLARY BLOOD GLUCOSE      POCT Blood Glucose.: 122 mg/dL (02 Jan 2020 15:09)  POCT Blood Glucose.: 146 mg/dL (02 Jan 2020 08:34)  POCT Blood Glucose.: 151 mg/dL (01 Jan 2020 23:04)  POCT Blood Glucose.: 99 mg/dL (01 Jan 2020 18:32)      Anion Gap, Serum: 13 (01-02 @ 12:10)  Anion Gap, Serum: 13 (01-02 @ 06:25)  Anion Gap, Serum: 15 <H> (01-01 @ 09:00)      Calcium, Total Serum: 9.7 (01-02 @ 12:10)  Calcium, Total Serum: 9.9 (01-02 @ 06:25)  Calcium, Total Serum: 9.8 (01-01 @ 09:00)          01-02    129<L>  |  90<L>  |  44<H>  ----------------------------<  299<H>  5.1   |  26  |  6.41<H>    Ca    9.7      02 Jan 2020 12:10  Phos  4.8     01-02  Mg     2.2     01-02                          12.4   6.68  )-----------( 168      ( 02 Jan 2020 06:25 )             39.6     Medications  MEDICATIONS  (STANDING):  aspirin enteric coated 81 milliGRAM(s) Oral daily  atorvastatin 20 milliGRAM(s) Oral at bedtime  calcitriol   Capsule 0.25 MICROGram(s) Oral daily  calcium acetate 1334 milliGRAM(s) Oral three times a day with meals  carvedilol 12.5 milliGRAM(s) Oral every 12 hours  chlorhexidine 4% Liquid 1 Application(s) Topical <User Schedule>  clopidogrel Tablet 75 milliGRAM(s) Oral daily  dextrose 5%. 1000 milliLiter(s) (50 mL/Hr) IV Continuous <Continuous>  dextrose 50% Injectable 12.5 Gram(s) IV Push once  dextrose 50% Injectable 25 Gram(s) IV Push once  dextrose 50% Injectable 25 Gram(s) IV Push once  DULoxetine 20 milliGRAM(s) Oral two times a day  folic acid 1 milliGRAM(s) Oral daily  insulin lispro (HumaLOG) corrective regimen sliding scale   SubCutaneous three times a day before meals  losartan 25 milliGRAM(s) Oral daily  senna 2 Tablet(s) Oral at bedtime  sodium chloride 0.9% lock flush 3 milliLiter(s) IV Push every 8 hours      Physical Exam  General: Patient comfortable in bed  Vital Signs Last 12 Hrs  T(F): 97.5 (01-02-20 @ 15:40), Max: 98.6 (01-02-20 @ 12:02)  HR: 62 (01-02-20 @ 15:40) (62 - 65)  BP: 149/61 (01-02-20 @ 15:40) (143/54 - 149/61)  BP(mean): --  RR: 16 (01-02-20 @ 15:40) (16 - 18)  SpO2: --  Neck: No palpable thyroid nodules.  CVS: S1S2, No murmurs  Respiratory: No wheezing, no crepitations  GI: Abdomen soft, bowel sounds positive  Musculoskeletal:  edema lower extremities.   Skin: No skin rashes, no ecchymosis    Diagnostics

## 2020-01-02 NOTE — PROGRESS NOTE ADULT - ASSESSMENT
74 year old female with PMH HTN, HLD, CAD (PCI/stent LCx), PVD, CHF, IDDM, and ESRD (Tu,Th,Sat, on Dialysis last completed on Saturday) and [facility] ANIKA HICKS who presented to  [facility] on 12/26 with c/o high blood glucose and mechanical fall transferred to [facility] for coronary angiogram

## 2020-01-02 NOTE — PROGRESS NOTE ADULT - PROBLEM SELECTOR PLAN 1
Will continue current insulin regimen for now. Will continue monitoring FS, log, will Follow up.  uggest to TN home on Prandin 0.5 mg daily before breakfast. Stop all other DM meds at home, check FS, Log and FU.  Patient counseled for compliance with consistent low carb diet, exercise as tolerated as out patient.

## 2020-01-02 NOTE — PHYSICAL THERAPY INITIAL EVALUATION ADULT - PERTINENT HX OF CURRENT PROBLEM, REHAB EVAL
73 y/o female presenting with hyperglycemia and mechanical fall. Pt found to have abnormal stress test.

## 2020-01-02 NOTE — PROGRESS NOTE ADULT - PROBLEM SELECTOR PLAN 2
finger sticks with short acting insulin sliding scale  endocrinologist help appreciated   continue to follow recommendations

## 2020-01-02 NOTE — PROGRESS NOTE ADULT - SUBJECTIVE AND OBJECTIVE BOX
Patient is a 74y old  Female who presents with a chief complaint of Weakness, hyperglycemia (02 Jan 2020 15:13)      SUBJECTIVE / OVERNIGHT EVENTS: overnight events noted    ROS:  Resp: No cough no sputum production  CVS: No chest pain no palpitations no orthopnea  GI: no N/V/D  : no dysuria, no hematuria  Neuro: no weakness no paresthesias  Heme: No petechiae no easy bruising  Msk: No joint pain no swelling  Skin: No rash no itching        MEDICATIONS  (STANDING):  aspirin enteric coated 81 milliGRAM(s) Oral daily  atorvastatin 20 milliGRAM(s) Oral at bedtime  calcitriol   Capsule 0.25 MICROGram(s) Oral daily  calcium acetate 1334 milliGRAM(s) Oral three times a day with meals  carvedilol 12.5 milliGRAM(s) Oral every 12 hours  chlorhexidine 4% Liquid 1 Application(s) Topical <User Schedule>  clopidogrel Tablet 75 milliGRAM(s) Oral daily  dextrose 5%. 1000 milliLiter(s) (50 mL/Hr) IV Continuous <Continuous>  dextrose 50% Injectable 12.5 Gram(s) IV Push once  dextrose 50% Injectable 25 Gram(s) IV Push once  dextrose 50% Injectable 25 Gram(s) IV Push once  DULoxetine 20 milliGRAM(s) Oral two times a day  folic acid 1 milliGRAM(s) Oral daily  insulin lispro (HumaLOG) corrective regimen sliding scale   SubCutaneous three times a day before meals  losartan 25 milliGRAM(s) Oral daily  senna 2 Tablet(s) Oral at bedtime  sodium chloride 0.9% lock flush 3 milliLiter(s) IV Push every 8 hours    MEDICATIONS  (PRN):  acetaminophen   Tablet .. 650 milliGRAM(s) Oral every 6 hours PRN Mild Pain (1 - 3), Moderate Pain (4 - 6), Severe Pain (7 - 10)  dextrose 40% Gel 15 Gram(s) Oral once PRN Blood Glucose LESS THAN 70 milliGRAM(s)/deciliter  glucagon  Injectable 1 milliGRAM(s) IntraMuscular once PRN Glucose LESS THAN 70 milligrams/deciliter  polyethylene glycol 3350 17 Gram(s) Oral two times a day PRN Constipation        CAPILLARY BLOOD GLUCOSE      POCT Blood Glucose.: 239 mg/dL (02 Jan 2020 18:17)  POCT Blood Glucose.: 122 mg/dL (02 Jan 2020 15:09)  POCT Blood Glucose.: 146 mg/dL (02 Jan 2020 08:34)  POCT Blood Glucose.: 151 mg/dL (01 Jan 2020 23:04)    I&O's Summary    02 Jan 2020 07:01  -  02 Jan 2020 19:00  --------------------------------------------------------  IN: 400 mL / OUT: 1750 mL / NET: -1350 mL        Vital Signs Last 24 Hrs  T(C): 36.6 (02 Jan 2020 18:18), Max: 37 (02 Jan 2020 12:02)  T(F): 97.9 (02 Jan 2020 18:18), Max: 98.6 (02 Jan 2020 12:02)  HR: 62 (02 Jan 2020 15:40) (60 - 65)  BP: 130/43 (02 Jan 2020 18:18) (122/45 - 149/61)  BP(mean): --  RR: 18 (02 Jan 2020 18:18) (16 - 18)  SpO2: 99% (02 Jan 2020 18:18) (96% - 99%)    PHYSICAL EXAM:  GENERAL: NAD, well-developed  HEAD:  Atraumatic, Normocephalic  EYES: EOMI, PERRLA, conjunctiva and sclera clear  NECK: Supple, No JVD  CHEST/LUNG: Clear to auscultation bilaterally; No wheeze  HEART: S1S2; holosystolic soft murmur best heard at left sternal border no rub no gallop   ABDOMEN: Soft, Nontender, Nondistended; Bowel sounds present  EXTREMITIES:  + Peripheral Pulses, No clubbing or cyanosis, no edema right leg  left BKA  PSYCH: AO x 3,   NEUROLOGY: Alert, no focal motor or sensory deficits  SKIN: No rashes or lesions    LABS:                        12.4   6.68  )-----------( 168      ( 02 Jan 2020 06:25 )             39.6     01-02    129<L>  |  90<L>  |  44<H>  ----------------------------<  299<H>  5.1   |  26  |  6.41<H>    Ca    9.7      02 Jan 2020 12:10  Phos  4.8     01-02  Mg     2.2     01-02                  All consultant(s) notes reviewed and care discussed with other providers        Contact Number, Dr Styles [phone]

## 2020-01-03 LAB
ANION GAP SERPL CALC-SCNC: 12 MMO/L — SIGNIFICANT CHANGE UP (ref 7–14)
BACTERIA NPH CULT: SIGNIFICANT CHANGE UP
BACTERIA NPH CULT: SIGNIFICANT CHANGE UP
BASOPHILS # BLD AUTO: 0.06 K/UL — SIGNIFICANT CHANGE UP (ref 0–0.2)
BASOPHILS NFR BLD AUTO: 0.9 % — SIGNIFICANT CHANGE UP (ref 0–2)
BUN SERPL-MCNC: 25 MG/DL — HIGH (ref 7–23)
CALCIUM SERPL-MCNC: 10 MG/DL — SIGNIFICANT CHANGE UP (ref 8.4–10.5)
CHLORIDE SERPL-SCNC: 93 MMOL/L — LOW (ref 98–107)
CO2 SERPL-SCNC: 27 MMOL/L — SIGNIFICANT CHANGE UP (ref 22–31)
CREAT SERPL-MCNC: 4.92 MG/DL — HIGH (ref 0.5–1.3)
EOSINOPHIL # BLD AUTO: 0.81 K/UL — HIGH (ref 0–0.5)
EOSINOPHIL NFR BLD AUTO: 11.7 % — HIGH (ref 0–6)
GLUCOSE BLDC GLUCOMTR-MCNC: 149 MG/DL — HIGH (ref 70–99)
GLUCOSE BLDC GLUCOMTR-MCNC: 231 MG/DL — HIGH (ref 70–99)
GLUCOSE BLDC GLUCOMTR-MCNC: 255 MG/DL — HIGH (ref 70–99)
GLUCOSE BLDC GLUCOMTR-MCNC: 279 MG/DL — HIGH (ref 70–99)
GLUCOSE SERPL-MCNC: 202 MG/DL — HIGH (ref 70–99)
HCT VFR BLD CALC: 40.6 % — SIGNIFICANT CHANGE UP (ref 34.5–45)
HGB BLD-MCNC: 12.7 G/DL — SIGNIFICANT CHANGE UP (ref 11.5–15.5)
IMM GRANULOCYTES NFR BLD AUTO: 0.4 % — SIGNIFICANT CHANGE UP (ref 0–1.5)
LYMPHOCYTES # BLD AUTO: 1.28 K/UL — SIGNIFICANT CHANGE UP (ref 1–3.3)
LYMPHOCYTES # BLD AUTO: 18.4 % — SIGNIFICANT CHANGE UP (ref 13–44)
MAGNESIUM SERPL-MCNC: 2 MG/DL — SIGNIFICANT CHANGE UP (ref 1.6–2.6)
MCHC RBC-ENTMCNC: 29.5 PG — SIGNIFICANT CHANGE UP (ref 27–34)
MCHC RBC-ENTMCNC: 31.3 % — LOW (ref 32–36)
MCV RBC AUTO: 94.4 FL — SIGNIFICANT CHANGE UP (ref 80–100)
MONOCYTES # BLD AUTO: 0.77 K/UL — SIGNIFICANT CHANGE UP (ref 0–0.9)
MONOCYTES NFR BLD AUTO: 11.1 % — SIGNIFICANT CHANGE UP (ref 2–14)
NEUTROPHILS # BLD AUTO: 4 K/UL — SIGNIFICANT CHANGE UP (ref 1.8–7.4)
NEUTROPHILS NFR BLD AUTO: 57.5 % — SIGNIFICANT CHANGE UP (ref 43–77)
NRBC # FLD: 0 K/UL — SIGNIFICANT CHANGE UP (ref 0–0)
PHOSPHATE SERPL-MCNC: 3.5 MG/DL — SIGNIFICANT CHANGE UP (ref 2.5–4.5)
PLATELET # BLD AUTO: 163 K/UL — SIGNIFICANT CHANGE UP (ref 150–400)
PMV BLD: 11.1 FL — SIGNIFICANT CHANGE UP (ref 7–13)
POTASSIUM SERPL-MCNC: 4.7 MMOL/L — SIGNIFICANT CHANGE UP (ref 3.5–5.3)
POTASSIUM SERPL-SCNC: 4.7 MMOL/L — SIGNIFICANT CHANGE UP (ref 3.5–5.3)
RBC # BLD: 4.3 M/UL — SIGNIFICANT CHANGE UP (ref 3.8–5.2)
RBC # FLD: 15.8 % — HIGH (ref 10.3–14.5)
SODIUM SERPL-SCNC: 132 MMOL/L — LOW (ref 135–145)
WBC # BLD: 6.95 K/UL — SIGNIFICANT CHANGE UP (ref 3.8–10.5)
WBC # FLD AUTO: 6.95 K/UL — SIGNIFICANT CHANGE UP (ref 3.8–10.5)

## 2020-01-03 RX ORDER — INSULIN GLARGINE 100 [IU]/ML
8 INJECTION, SOLUTION SUBCUTANEOUS AT BEDTIME
Refills: 0 | Status: DISCONTINUED | OUTPATIENT
Start: 2020-01-03 | End: 2020-01-07

## 2020-01-03 RX ORDER — INSULIN LISPRO 100/ML
6 VIAL (ML) SUBCUTANEOUS
Refills: 0 | Status: DISCONTINUED | OUTPATIENT
Start: 2020-01-03 | End: 2020-01-07

## 2020-01-03 RX ADMIN — SENNA PLUS 2 TABLET(S): 8.6 TABLET ORAL at 21:18

## 2020-01-03 RX ADMIN — Medication 3: at 09:06

## 2020-01-03 RX ADMIN — Medication 81 MILLIGRAM(S): at 13:32

## 2020-01-03 RX ADMIN — Medication 1 MILLIGRAM(S): at 13:32

## 2020-01-03 RX ADMIN — ATORVASTATIN CALCIUM 20 MILLIGRAM(S): 80 TABLET, FILM COATED ORAL at 21:18

## 2020-01-03 RX ADMIN — Medication 1334 MILLIGRAM(S): at 17:40

## 2020-01-03 RX ADMIN — CALCITRIOL 0.25 MICROGRAM(S): 0.5 CAPSULE ORAL at 13:32

## 2020-01-03 RX ADMIN — DULOXETINE HYDROCHLORIDE 20 MILLIGRAM(S): 30 CAPSULE, DELAYED RELEASE ORAL at 06:45

## 2020-01-03 RX ADMIN — SODIUM CHLORIDE 3 MILLILITER(S): 9 INJECTION INTRAMUSCULAR; INTRAVENOUS; SUBCUTANEOUS at 06:46

## 2020-01-03 RX ADMIN — Medication 1334 MILLIGRAM(S): at 13:32

## 2020-01-03 RX ADMIN — Medication 2: at 17:40

## 2020-01-03 RX ADMIN — Medication 1334 MILLIGRAM(S): at 09:07

## 2020-01-03 RX ADMIN — CHLORHEXIDINE GLUCONATE 1 APPLICATION(S): 213 SOLUTION TOPICAL at 09:08

## 2020-01-03 RX ADMIN — SODIUM CHLORIDE 3 MILLILITER(S): 9 INJECTION INTRAMUSCULAR; INTRAVENOUS; SUBCUTANEOUS at 13:30

## 2020-01-03 RX ADMIN — CARVEDILOL PHOSPHATE 12.5 MILLIGRAM(S): 80 CAPSULE, EXTENDED RELEASE ORAL at 06:45

## 2020-01-03 RX ADMIN — CLOPIDOGREL BISULFATE 75 MILLIGRAM(S): 75 TABLET, FILM COATED ORAL at 13:32

## 2020-01-03 RX ADMIN — CARVEDILOL PHOSPHATE 12.5 MILLIGRAM(S): 80 CAPSULE, EXTENDED RELEASE ORAL at 17:40

## 2020-01-03 RX ADMIN — Medication 6 UNIT(S): at 17:41

## 2020-01-03 RX ADMIN — INSULIN GLARGINE 8 UNIT(S): 100 INJECTION, SOLUTION SUBCUTANEOUS at 21:18

## 2020-01-03 RX ADMIN — LOSARTAN POTASSIUM 25 MILLIGRAM(S): 100 TABLET, FILM COATED ORAL at 06:45

## 2020-01-03 RX ADMIN — Medication 3: at 13:31

## 2020-01-03 RX ADMIN — SODIUM CHLORIDE 3 MILLILITER(S): 9 INJECTION INTRAMUSCULAR; INTRAVENOUS; SUBCUTANEOUS at 21:19

## 2020-01-03 RX ADMIN — DULOXETINE HYDROCHLORIDE 20 MILLIGRAM(S): 30 CAPSULE, DELAYED RELEASE ORAL at 17:40

## 2020-01-03 NOTE — PROGRESS NOTE ADULT - PROBLEM SELECTOR PLAN 1
Will increase Lantus to 8 units at bed time.  Will increase Humalog to 6 units before each meal in addition to Humalog correction scale coverage.  For DC home start on Prandin 1mg PO before each meal, FS Log, FU

## 2020-01-03 NOTE — PROGRESS NOTE ADULT - ASSESSMENT
74 year old female with PMH HTN, HLD, CAD (PCI/stent LCx), PVD, CHF, IDDM, and ESRD (Tu,Th,Sat, on Dialysis last completed on Saturday) and PSH ANIKA HICKS who presented to  [facility] on 12/26 with c/o high blood glucose and mechanical fall transferred to [facility] for coronary angiogram

## 2020-01-03 NOTE — PROGRESS NOTE ADULT - ASSESSMENT
Assessment  DMT2: 74y Female with DM T2 with hypoglycemia, was on oral meds and on insulin at home that caused hypoglycemia in the setting of ESRD,  now on insulin correction scale coverage only, blood sugars running high, she is eating full meals now.  Chest pain: Being worked up, monitored, stable.  HTN:  Controlled,  on antihypertensive medications.  ESRD: On hemodialysis, Monitor labs/BMP            Alexander Saenz MD  Cell: [phone]  Office: [phone]

## 2020-01-03 NOTE — PROVIDER CONTACT NOTE (OTHER) - BACKGROUND
Pt 74 year old female admitted for chest pain, abnormal stress test, had right groin cath on admission

## 2020-01-03 NOTE — PROGRESS NOTE ADULT - ASSESSMENT
ESRD ON HD,  outpt TTS  K, vol ok  hyponatremia ?dilutional-better  no EPO AS  HB above goal.   HTN, controlled.   CAD s/p PCI w/LCx stent  DM- mx per primary team    labs, chart reviewed  s/p HD yesterday, Rx sheet reviewed, net UF 1350ml, tolerated well. uneventful.   plan for next HD tmorrow w/2k bath, no heparin, inc uf 1.5-2kg as tolearted  c/w BB, ARB  c/w renal restrictions in diet  stable for d/c renal stand point back to rehab  f/w w/SW

## 2020-01-03 NOTE — PROGRESS NOTE ADULT - SUBJECTIVE AND OBJECTIVE BOX
[facility] - [place] / Ximena SCOTT /Abhinav/ TYLER Saenz/ TYLER Russell/ Harjit Arriaza / KIERSTEN Njeru  ---------------------------------------------------------------------------------------------------------------    Patient seen and examined bedside    Subjective and Objective: No overnight events, denied sob. No complaints today. feeling better    Allergies: No Known Allergies      Hospital Medications:   MEDICATIONS  (STANDING):  aspirin enteric coated 81 milliGRAM(s) Oral daily  atorvastatin 20 milliGRAM(s) Oral at bedtime  calcitriol   Capsule 0.25 MICROGram(s) Oral daily  calcium acetate 1334 milliGRAM(s) Oral three times a day with meals  carvedilol 12.5 milliGRAM(s) Oral every 12 hours  chlorhexidine 4% Liquid 1 Application(s) Topical <User Schedule>  clopidogrel Tablet 75 milliGRAM(s) Oral daily  dextrose 5%. 1000 milliLiter(s) (50 mL/Hr) IV Continuous <Continuous>  dextrose 50% Injectable 12.5 Gram(s) IV Push once  dextrose 50% Injectable 25 Gram(s) IV Push once  dextrose 50% Injectable 25 Gram(s) IV Push once  DULoxetine 20 milliGRAM(s) Oral two times a day  folic acid 1 milliGRAM(s) Oral daily  insulin glargine Injectable (LANTUS) 8 Unit(s) SubCutaneous at bedtime  insulin lispro (HumaLOG) corrective regimen sliding scale   SubCutaneous three times a day before meals  insulin lispro Injectable (HumaLOG) 6 Unit(s) SubCutaneous three times a day before meals  losartan 25 milliGRAM(s) Oral daily  senna 2 Tablet(s) Oral at bedtime  sodium chloride 0.9% lock flush 3 milliLiter(s) IV Push every 8 hours      VITALS:  T(F): 97.9 (01-03-20 @ 13:30), Max: 98.1 (01-02-20 @ 23:07)  HR: 66 (01-03-20 @ 13:30)  BP: 131/47 (01-03-20 @ 13:30)  RR: 16 (01-03-20 @ 13:30)  SpO2: 98% (01-03-20 @ 13:30)  Wt(kg): --    01-02 @ 07:01  -  01-03 @ 07:00  --------------------------------------------------------  IN: 400 mL / OUT: 1750 mL / NET: -1350 mL      PHYSICAL EXAM:  Constitutional: NAD  HEENT: anicteric sclera, oropharynx clear  Neck: No JVD  Respiratory: CTAB, no wheezes, rales or rhonchi  Cardiovascular: S1, S2, RRR  Gastrointestinal: BS+, soft, NT/ND  Extremities: No cyanosis or clubbing. No peripheral edema  Neurological: A/O x 3, no focal deficits  Psychiatric: Normal mood, normal affect  : No kemp.   Vascular Access: chest AVG +    LABS:  01-03    132<L>  |  93<L>  |  25<H>  ----------------------------<  202<H>  4.7   |  27  |  4.92<H>    Ca    10.0      03 Jan 2020 07:10  Phos  3.5     01-03  Mg     2.0     01-03      Creatinine Trend: 4.92 <--, 6.41 <--, 6.49 <--, 5.32 <--, 9.25 <--, 8.07 <--, 6.18 <--, 7.44 <--                        12.7   6.95  )-----------( 163      ( 03 Jan 2020 07:10 )             40.6     Urine Studies:        RADIOLOGY & ADDITIONAL STUDIES:

## 2020-01-03 NOTE — PROGRESS NOTE ADULT - SUBJECTIVE AND OBJECTIVE BOX
Patient is a 74y old  Female who presents with a chief complaint of Weakness, hyperglycemia (03 Jan 2020 14:06)      SUBJECTIVE / OVERNIGHT EVENTS: overnight events noted    ROS:  Resp: No cough no sputum production  CVS: No chest pain no palpitations no orthopnea  GI: no N/V/D  : no dysuria, no hematuria  Neuro: no weakness no paresthesias  Heme: No petechiae no easy bruising  Msk: No joint pain no swelling  Skin: No rash no itching        MEDICATIONS  (STANDING):  aspirin enteric coated 81 milliGRAM(s) Oral daily  atorvastatin 20 milliGRAM(s) Oral at bedtime  calcitriol   Capsule 0.25 MICROGram(s) Oral daily  calcium acetate 1334 milliGRAM(s) Oral three times a day with meals  carvedilol 12.5 milliGRAM(s) Oral every 12 hours  chlorhexidine 4% Liquid 1 Application(s) Topical <User Schedule>  clopidogrel Tablet 75 milliGRAM(s) Oral daily  dextrose 5%. 1000 milliLiter(s) (50 mL/Hr) IV Continuous <Continuous>  dextrose 50% Injectable 12.5 Gram(s) IV Push once  dextrose 50% Injectable 25 Gram(s) IV Push once  dextrose 50% Injectable 25 Gram(s) IV Push once  DULoxetine 20 milliGRAM(s) Oral two times a day  folic acid 1 milliGRAM(s) Oral daily  insulin glargine Injectable (LANTUS) 8 Unit(s) SubCutaneous at bedtime  insulin lispro (HumaLOG) corrective regimen sliding scale   SubCutaneous three times a day before meals  insulin lispro Injectable (HumaLOG) 6 Unit(s) SubCutaneous three times a day before meals  losartan 25 milliGRAM(s) Oral daily  senna 2 Tablet(s) Oral at bedtime  sodium chloride 0.9% lock flush 3 milliLiter(s) IV Push every 8 hours    MEDICATIONS  (PRN):  acetaminophen   Tablet .. 650 milliGRAM(s) Oral every 6 hours PRN Mild Pain (1 - 3), Moderate Pain (4 - 6), Severe Pain (7 - 10)  dextrose 40% Gel 15 Gram(s) Oral once PRN Blood Glucose LESS THAN 70 milliGRAM(s)/deciliter  glucagon  Injectable 1 milliGRAM(s) IntraMuscular once PRN Glucose LESS THAN 70 milligrams/deciliter  polyethylene glycol 3350 17 Gram(s) Oral two times a day PRN Constipation        CAPILLARY BLOOD GLUCOSE      POCT Blood Glucose.: 231 mg/dL (03 Jan 2020 17:16)  POCT Blood Glucose.: 279 mg/dL (03 Jan 2020 12:43)  POCT Blood Glucose.: 255 mg/dL (03 Jan 2020 08:33)    I&O's Summary    02 Jan 2020 07:01  -  03 Jan 2020 07:00  --------------------------------------------------------  IN: 400 mL / OUT: 1750 mL / NET: -1350 mL        Vital Signs Last 24 Hrs  T(C): 36.7 (03 Jan 2020 17:00), Max: 36.7 (02 Jan 2020 23:07)  T(F): 98 (03 Jan 2020 17:00), Max: 98.1 (02 Jan 2020 23:07)  HR: 61 (03 Jan 2020 17:00) (60 - 66)  BP: 139/48 (03 Jan 2020 17:00) (120/65 - 139/48)  BP(mean): 61 (03 Jan 2020 09:10) (61 - 61)  RR: 18 (03 Jan 2020 17:00) (16 - 18)  SpO2: 98% (03 Jan 2020 17:00) (98% - 99%)    PHYSICAL EXAM:  GENERAL: NAD, well-developed  HEAD:  Atraumatic, Normocephalic  EYES: EOMI, PERRLA, conjunctiva and sclera clear  NECK: Supple, No JVD  CHEST/LUNG: Clear to auscultation bilaterally; No wheeze  HEART: S1S2; holosystolic soft murmur best heard at left sternal border no rub no gallop   ABDOMEN: Soft, Nontender, Nondistended; Bowel sounds present  EXTREMITIES:  + Peripheral Pulses, No clubbing or cyanosis, no edema right leg  left BKA  PSYCH: AO x 3,   NEUROLOGY: Alert, no focal motor or sensory deficits  SKIN: No rashes or lesions    LABS:                        12.7   6.95  )-----------( 163      ( 03 Jan 2020 07:10 )             40.6     01-03    132<L>  |  93<L>  |  25<H>  ----------------------------<  202<H>  4.7   |  27  |  4.92<H>    Ca    10.0      03 Jan 2020 07:10  Phos  3.5     01-03  Mg     2.0     01-03                  All consultant(s) notes reviewed and care discussed with other providers        Contact Number, Dr Styles [phone]

## 2020-01-03 NOTE — PROGRESS NOTE ADULT - SUBJECTIVE AND OBJECTIVE BOX
Patient denies CP, SOB Review of systems otherwise (-)         acetaminophen   Tablet .. 650 milliGRAM(s) Oral every 6 hours PRN  aspirin enteric coated 81 milliGRAM(s) Oral daily  atorvastatin 20 milliGRAM(s) Oral at bedtime  calcitriol   Capsule 0.25 MICROGram(s) Oral daily  calcium acetate 1334 milliGRAM(s) Oral three times a day with meals  carvedilol 12.5 milliGRAM(s) Oral every 12 hours  chlorhexidine 4% Liquid 1 Application(s) Topical <User Schedule>  clopidogrel Tablet 75 milliGRAM(s) Oral daily  dextrose 40% Gel 15 Gram(s) Oral once PRN  dextrose 5%. 1000 milliLiter(s) IV Continuous <Continuous>  dextrose 50% Injectable 12.5 Gram(s) IV Push once  dextrose 50% Injectable 25 Gram(s) IV Push once  dextrose 50% Injectable 25 Gram(s) IV Push once  DULoxetine 20 milliGRAM(s) Oral two times a day  folic acid 1 milliGRAM(s) Oral daily  glucagon  Injectable 1 milliGRAM(s) IntraMuscular once PRN  insulin glargine Injectable (LANTUS) 8 Unit(s) SubCutaneous at bedtime  insulin lispro (HumaLOG) corrective regimen sliding scale   SubCutaneous three times a day before meals  insulin lispro Injectable (HumaLOG) 6 Unit(s) SubCutaneous three times a day before meals  losartan 25 milliGRAM(s) Oral daily  polyethylene glycol 3350 17 Gram(s) Oral two times a day PRN  senna 2 Tablet(s) Oral at bedtime  sodium chloride 0.9% lock flush 3 milliLiter(s) IV Push every 8 hours                          12.7   6.95  )-----------( 163      ( 03 Jan 2020 07:10 )             40.6       Hemoglobin: 12.7 g/dL (01-03 @ 07:10)  Hemoglobin: 12.4 g/dL (01-02 @ 06:25)  Hemoglobin: 12.9 g/dL (01-01 @ 09:00)  Hemoglobin: 12.7 g/dL (12-31 @ 09:15)  Hemoglobin: 13.0 g/dL (12-30 @ 07:04)      01-03    132<L>  |  93<L>  |  25<H>  ----------------------------<  202<H>  4.7   |  27  |  4.92<H>    Ca    10.0      03 Jan 2020 07:10  Phos  3.5     01-03  Mg     2.0     01-03      Creatinine Trend: 4.92<--, 6.41<--, 6.49<--, 5.32<--, 9.25<--, 8.07<--    COAGS:       PHYSICAL EXAM:  Vital Signs last 24 Hours   T(C): 36.6 (01-03-20 @ 13:30), Max: 36.7 (01-02-20 @ 23:07)  HR: 66 (01-03-20 @ 13:30) (60 - 66)  BP: 131/47 (01-03-20 @ 13:30) (120/65 - 149/61)  RR: 16 (01-03-20 @ 13:30) (16 - 18)  SpO2: 98% (01-03-20 @ 13:30) (98% - 99%)  Wt(kg): --    I&O's Summary    02 Jan 2020 07:01  -  03 Jan 2020 07:00  --------------------------------------------------------  IN: 400 mL / OUT: 1750 mL / NET: -1350 mL        Gen: NAD well developed well nourished   HEENT:  (-)icterus (-)pallor  CV: N S1 S2 1/6 COCO (+)2 Pulses B/l  Resp:  Clear to auscultation B/L, normal effort  GI: (+) BS Soft, NT, ND  Lymph:  (-)Edema, (-)obvious lymphadenopathy, L aka  Skin: Warm to touch, Normal turgor  Psych: Appropriate mood and affect      ECG:  	SR / SB, PVCs       < from: Transthoracic Echocardiogram (04.04.16 @ 15:01) >  CONCLUSIONS:  1. Normal left ventricular systolic function. No segmental  wall motion abnormalities.  2. Mild diastolic dysfunction (Stage I).  3. Normal right ventricular size and function.    < end of copied text >  RADIOLOGY:    CXR:  No infiltrate       < from: Cardiac Cath Lab - Adult (12.30.19 @ 15:26) >  DIAGNOSTIC RECOMMENDATIONS: Coronary angiogram demonstratesmultivessel  CAD. Will consult CT surgery for surgical opinion. If the patient is not a  candidate for CABG, will consider PCI.  Prepared and signed by  Dom Gonsales M.D.         ASSESSMENT/PLAN: 	    74y Female PMhx of HTN, HLD, CAD, s/p PCI to LCX in 2015, recent Ischemia noted on stress in 9/19 cancelled cath due to flu, DM, osteo arthritis and ESRD (Tu,Th,Sat, on Dialysis Left AKA, admitted with fall and hyperglycemia found to have abnormal stress test ( moderate to severe ischemia in the mid anterior, anterolateral and inferolateral walls)  with historically normal LV function on TTE in 2016:  12/31 s/p LHC via RFA LCx 95% RICKEY x1    - CAD - cath performed 12/30 with CX/LAD/RCA disease , s/p RICKEY to LCX   - non-obstructive cad in LAD with DEONTE 3 flow; culprit lesion was the 95% proximal cx lesion; therefore will medically manage LAD stenosis for now; can consider staged PCI in the future if clinically indicated  -c/w asa/plavix/statin/coreg/losartan    - HD per renal -hyponatremia noted - no objection to dc from renal perspective   - Glycemic control per endo   - HD stable no evidence CHF  - RRA without hematoma or ecchymosis, palpable pulses   - pending PT reccs rehab, pt in agreement, d/c when accepted and bed available   - follow up with Dr Odom 1/13 @ 1045am for cardio Patient denies CP, SOB Review of systems otherwise (-)         acetaminophen   Tablet .. 650 milliGRAM(s) Oral every 6 hours PRN  aspirin enteric coated 81 milliGRAM(s) Oral daily  atorvastatin 20 milliGRAM(s) Oral at bedtime  calcitriol   Capsule 0.25 MICROGram(s) Oral daily  calcium acetate 1334 milliGRAM(s) Oral three times a day with meals  carvedilol 12.5 milliGRAM(s) Oral every 12 hours  chlorhexidine 4% Liquid 1 Application(s) Topical <User Schedule>  clopidogrel Tablet 75 milliGRAM(s) Oral daily  dextrose 40% Gel 15 Gram(s) Oral once PRN  dextrose 5%. 1000 milliLiter(s) IV Continuous <Continuous>  dextrose 50% Injectable 12.5 Gram(s) IV Push once  dextrose 50% Injectable 25 Gram(s) IV Push once  dextrose 50% Injectable 25 Gram(s) IV Push once  DULoxetine 20 milliGRAM(s) Oral two times a day  folic acid 1 milliGRAM(s) Oral daily  glucagon  Injectable 1 milliGRAM(s) IntraMuscular once PRN  insulin glargine Injectable (LANTUS) 8 Unit(s) SubCutaneous at bedtime  insulin lispro (HumaLOG) corrective regimen sliding scale   SubCutaneous three times a day before meals  insulin lispro Injectable (HumaLOG) 6 Unit(s) SubCutaneous three times a day before meals  losartan 25 milliGRAM(s) Oral daily  polyethylene glycol 3350 17 Gram(s) Oral two times a day PRN  senna 2 Tablet(s) Oral at bedtime  sodium chloride 0.9% lock flush 3 milliLiter(s) IV Push every 8 hours                          12.7   6.95  )-----------( 163      ( 03 Jan 2020 07:10 )             40.6       Hemoglobin: 12.7 g/dL (01-03 @ 07:10)  Hemoglobin: 12.4 g/dL (01-02 @ 06:25)  Hemoglobin: 12.9 g/dL (01-01 @ 09:00)  Hemoglobin: 12.7 g/dL (12-31 @ 09:15)  Hemoglobin: 13.0 g/dL (12-30 @ 07:04)      01-03    132<L>  |  93<L>  |  25<H>  ----------------------------<  202<H>  4.7   |  27  |  4.92<H>    Ca    10.0      03 Jan 2020 07:10  Phos  3.5     01-03  Mg     2.0     01-03      Creatinine Trend: 4.92<--, 6.41<--, 6.49<--, 5.32<--, 9.25<--, 8.07<--    COAGS:       PHYSICAL EXAM:  Vital Signs last 24 Hours   T(C): 36.6 (01-03-20 @ 13:30), Max: 36.7 (01-02-20 @ 23:07)  HR: 66 (01-03-20 @ 13:30) (60 - 66)  BP: 131/47 (01-03-20 @ 13:30) (120/65 - 149/61)  RR: 16 (01-03-20 @ 13:30) (16 - 18)  SpO2: 98% (01-03-20 @ 13:30) (98% - 99%)  Wt(kg): --    I&O's Summary    02 Jan 2020 07:01  -  03 Jan 2020 07:00  --------------------------------------------------------  IN: 400 mL / OUT: 1750 mL / NET: -1350 mL        Gen: NAD well developed well nourished   HEENT:  (-)icterus (-)pallor  CV: N S1 S2 1/6 COCO (+)2 Pulses B/l  Resp:  Clear to auscultation B/L, normal effort  GI: (+) BS Soft, NT, ND  Lymph:  (-)Edema, (-)obvious lymphadenopathy, L aka  Skin: Warm to touch, Normal turgor  Psych: Appropriate mood and affect      ECG:  	SR / SB, PVCs       < from: Transthoracic Echocardiogram (04.04.16 @ 15:01) >  CONCLUSIONS:  1. Normal left ventricular systolic function. No segmental  wall motion abnormalities.  2. Mild diastolic dysfunction (Stage I).  3. Normal right ventricular size and function.    < end of copied text >  RADIOLOGY:    CXR:  No infiltrate       < from: Cardiac Cath Lab - Adult (12.30.19 @ 15:26) >  DIAGNOSTIC RECOMMENDATIONS: Coronary angiogram demonstratesmultivessel  CAD. Will consult CT surgery for surgical opinion. If the patient is not a  candidate for CABG, will consider PCI.  Prepared and signed by  Dom Gonsales M.D.         ASSESSMENT/PLAN: 	    74y Female PMhx of HTN, HLD, CAD, s/p PCI to LCX in 2015, recent Ischemia noted on stress in 9/19 cancelled cath due to flu, DM, osteo arthritis and ESRD (Tu,Th,Sat, on Dialysis Left AKA, admitted with fall and hyperglycemia found to have abnormal stress test ( moderate to severe ischemia in the mid anterior, anterolateral and inferolateral walls)  with historically normal LV function on TTE in 2016:  12/31 s/p LHC via RFA LCx 95% RICKEY x1    - CAD - cath performed 12/30 with CX/LAD/RCA disease , s/p RICKEY to LCX , cont with DAPT   - non-obstructive cad in LAD with DEONTE 3 flow; culprit lesion was the 95% proximal cx lesion; therefore will medically manage LAD stenosis for now; can consider staged PCI in the future if clinically indicated  -c/w asa/plavix/statin/coreg/losartan    - HD per renal -hyponatremia noted - no objection to dc from renal perspective   - Glycemic control per endo   - HD stable no evidence CHF  - RRA without hematoma or ecchymosis, palpable pulses   - pending PT reccs rehab, pt in agreement, d/c when accepted and bed available   - follow up with Dr Odom 1/13 @ 1045am for cardio

## 2020-01-03 NOTE — PROGRESS NOTE ADULT - SUBJECTIVE AND OBJECTIVE BOX
Chief complaint  Patient is a 74y old  Female who presents with a chief complaint of Weakness, hyperglycemia (03 Jan 2020 14:06)   Review of systems  Patient in bed, looks comfortable, no fever, no hypoglycemia.    Labs and Fingersticks  CAPILLARY BLOOD GLUCOSE      POCT Blood Glucose.: 279 mg/dL (03 Jan 2020 12:43)  POCT Blood Glucose.: 255 mg/dL (03 Jan 2020 08:33)  POCT Blood Glucose.: 239 mg/dL (02 Jan 2020 18:17)      Anion Gap, Serum: 12 (01-03 @ 07:10)  Anion Gap, Serum: 13 (01-02 @ 12:10)  Anion Gap, Serum: 13 (01-02 @ 06:25)      Calcium, Total Serum: 10.0 (01-03 @ 07:10)  Calcium, Total Serum: 9.7 (01-02 @ 12:10)  Calcium, Total Serum: 9.9 (01-02 @ 06:25)          01-03    132<L>  |  93<L>  |  25<H>  ----------------------------<  202<H>  4.7   |  27  |  4.92<H>    Ca    10.0      03 Jan 2020 07:10  Phos  3.5     01-03  Mg     2.0     01-03                          12.7   6.95  )-----------( 163      ( 03 Jan 2020 07:10 )             40.6     Medications  MEDICATIONS  (STANDING):  aspirin enteric coated 81 milliGRAM(s) Oral daily  atorvastatin 20 milliGRAM(s) Oral at bedtime  calcitriol   Capsule 0.25 MICROGram(s) Oral daily  calcium acetate 1334 milliGRAM(s) Oral three times a day with meals  carvedilol 12.5 milliGRAM(s) Oral every 12 hours  chlorhexidine 4% Liquid 1 Application(s) Topical <User Schedule>  clopidogrel Tablet 75 milliGRAM(s) Oral daily  dextrose 5%. 1000 milliLiter(s) (50 mL/Hr) IV Continuous <Continuous>  dextrose 50% Injectable 12.5 Gram(s) IV Push once  dextrose 50% Injectable 25 Gram(s) IV Push once  dextrose 50% Injectable 25 Gram(s) IV Push once  DULoxetine 20 milliGRAM(s) Oral two times a day  folic acid 1 milliGRAM(s) Oral daily  insulin glargine Injectable (LANTUS) 8 Unit(s) SubCutaneous at bedtime  insulin lispro (HumaLOG) corrective regimen sliding scale   SubCutaneous three times a day before meals  insulin lispro Injectable (HumaLOG) 6 Unit(s) SubCutaneous three times a day before meals  losartan 25 milliGRAM(s) Oral daily  senna 2 Tablet(s) Oral at bedtime  sodium chloride 0.9% lock flush 3 milliLiter(s) IV Push every 8 hours      Physical Exam  General: Patient comfortable in bed  Vital Signs Last 12 Hrs  T(F): 97.9 (01-03-20 @ 13:30), Max: 98 (01-03-20 @ 04:50)  HR: 66 (01-03-20 @ 13:30) (60 - 66)  BP: 131/47 (01-03-20 @ 13:30) (131/46 - 136/61)  BP(mean): 61 (01-03-20 @ 09:10) (61 - 61)  RR: 16 (01-03-20 @ 13:30) (16 - 18)  SpO2: 98% (01-03-20 @ 13:30) (98% - 98%)  Neck: No palpable thyroid nodules.  CVS: S1S2, No murmurs  Respiratory: No wheezing, no crepitations  GI: Abdomen soft, bowel sounds positive  Musculoskeletal:  edema lower extremities.   Skin: No skin rashes, no ecchymosis    Diagnostics

## 2020-01-04 LAB
ANION GAP SERPL CALC-SCNC: 13 MMO/L — SIGNIFICANT CHANGE UP (ref 7–14)
BASOPHILS # BLD AUTO: 0.04 K/UL — SIGNIFICANT CHANGE UP (ref 0–0.2)
BASOPHILS NFR BLD AUTO: 0.6 % — SIGNIFICANT CHANGE UP (ref 0–2)
BUN SERPL-MCNC: 40 MG/DL — HIGH (ref 7–23)
CALCIUM SERPL-MCNC: 10.4 MG/DL — SIGNIFICANT CHANGE UP (ref 8.4–10.5)
CHLORIDE SERPL-SCNC: 97 MMOL/L — LOW (ref 98–107)
CO2 SERPL-SCNC: 25 MMOL/L — SIGNIFICANT CHANGE UP (ref 22–31)
CREAT SERPL-MCNC: 6.61 MG/DL — HIGH (ref 0.5–1.3)
EOSINOPHIL # BLD AUTO: 0.84 K/UL — HIGH (ref 0–0.5)
EOSINOPHIL NFR BLD AUTO: 11.8 % — HIGH (ref 0–6)
GLUCOSE BLDC GLUCOMTR-MCNC: 147 MG/DL — HIGH (ref 70–99)
GLUCOSE BLDC GLUCOMTR-MCNC: 162 MG/DL — HIGH (ref 70–99)
GLUCOSE BLDC GLUCOMTR-MCNC: 186 MG/DL — HIGH (ref 70–99)
GLUCOSE BLDC GLUCOMTR-MCNC: 200 MG/DL — HIGH (ref 70–99)
GLUCOSE SERPL-MCNC: 190 MG/DL — HIGH (ref 70–99)
HCT VFR BLD CALC: 37.4 % — SIGNIFICANT CHANGE UP (ref 34.5–45)
HGB BLD-MCNC: 11.6 G/DL — SIGNIFICANT CHANGE UP (ref 11.5–15.5)
IMM GRANULOCYTES NFR BLD AUTO: 0.3 % — SIGNIFICANT CHANGE UP (ref 0–1.5)
LYMPHOCYTES # BLD AUTO: 1.38 K/UL — SIGNIFICANT CHANGE UP (ref 1–3.3)
LYMPHOCYTES # BLD AUTO: 19.4 % — SIGNIFICANT CHANGE UP (ref 13–44)
MAGNESIUM SERPL-MCNC: 2.1 MG/DL — SIGNIFICANT CHANGE UP (ref 1.6–2.6)
MCHC RBC-ENTMCNC: 28.9 PG — SIGNIFICANT CHANGE UP (ref 27–34)
MCHC RBC-ENTMCNC: 31 % — LOW (ref 32–36)
MCV RBC AUTO: 93.3 FL — SIGNIFICANT CHANGE UP (ref 80–100)
MONOCYTES # BLD AUTO: 0.71 K/UL — SIGNIFICANT CHANGE UP (ref 0–0.9)
MONOCYTES NFR BLD AUTO: 10 % — SIGNIFICANT CHANGE UP (ref 2–14)
NEUTROPHILS # BLD AUTO: 4.14 K/UL — SIGNIFICANT CHANGE UP (ref 1.8–7.4)
NEUTROPHILS NFR BLD AUTO: 57.9 % — SIGNIFICANT CHANGE UP (ref 43–77)
NRBC # FLD: 0 K/UL — SIGNIFICANT CHANGE UP (ref 0–0)
PHOSPHATE SERPL-MCNC: 3.6 MG/DL — SIGNIFICANT CHANGE UP (ref 2.5–4.5)
PLATELET # BLD AUTO: 173 K/UL — SIGNIFICANT CHANGE UP (ref 150–400)
PMV BLD: 11.2 FL — SIGNIFICANT CHANGE UP (ref 7–13)
POTASSIUM SERPL-MCNC: 4.7 MMOL/L — SIGNIFICANT CHANGE UP (ref 3.5–5.3)
POTASSIUM SERPL-SCNC: 4.7 MMOL/L — SIGNIFICANT CHANGE UP (ref 3.5–5.3)
RBC # BLD: 4.01 M/UL — SIGNIFICANT CHANGE UP (ref 3.8–5.2)
RBC # FLD: 15.5 % — HIGH (ref 10.3–14.5)
SODIUM SERPL-SCNC: 135 MMOL/L — SIGNIFICANT CHANGE UP (ref 135–145)
WBC # BLD: 7.13 K/UL — SIGNIFICANT CHANGE UP (ref 3.8–10.5)
WBC # FLD AUTO: 7.13 K/UL — SIGNIFICANT CHANGE UP (ref 3.8–10.5)

## 2020-01-04 RX ORDER — SEVELAMER CARBONATE 2400 MG/1
1600 POWDER, FOR SUSPENSION ORAL
Refills: 0 | Status: DISCONTINUED | OUTPATIENT
Start: 2020-01-04 | End: 2020-01-07

## 2020-01-04 RX ADMIN — Medication 1: at 09:04

## 2020-01-04 RX ADMIN — SODIUM CHLORIDE 3 MILLILITER(S): 9 INJECTION INTRAMUSCULAR; INTRAVENOUS; SUBCUTANEOUS at 22:04

## 2020-01-04 RX ADMIN — ATORVASTATIN CALCIUM 20 MILLIGRAM(S): 80 TABLET, FILM COATED ORAL at 22:03

## 2020-01-04 RX ADMIN — Medication 6 UNIT(S): at 13:02

## 2020-01-04 RX ADMIN — SEVELAMER CARBONATE 1600 MILLIGRAM(S): 2400 POWDER, FOR SUSPENSION ORAL at 22:02

## 2020-01-04 RX ADMIN — DULOXETINE HYDROCHLORIDE 20 MILLIGRAM(S): 30 CAPSULE, DELAYED RELEASE ORAL at 05:38

## 2020-01-04 RX ADMIN — CHLORHEXIDINE GLUCONATE 1 APPLICATION(S): 213 SOLUTION TOPICAL at 09:05

## 2020-01-04 RX ADMIN — Medication 1334 MILLIGRAM(S): at 09:04

## 2020-01-04 RX ADMIN — INSULIN GLARGINE 8 UNIT(S): 100 INJECTION, SOLUTION SUBCUTANEOUS at 22:44

## 2020-01-04 RX ADMIN — Medication 1: at 13:01

## 2020-01-04 RX ADMIN — SODIUM CHLORIDE 3 MILLILITER(S): 9 INJECTION INTRAMUSCULAR; INTRAVENOUS; SUBCUTANEOUS at 05:31

## 2020-01-04 RX ADMIN — CARVEDILOL PHOSPHATE 12.5 MILLIGRAM(S): 80 CAPSULE, EXTENDED RELEASE ORAL at 05:38

## 2020-01-04 RX ADMIN — CALCITRIOL 0.25 MICROGRAM(S): 0.5 CAPSULE ORAL at 13:02

## 2020-01-04 RX ADMIN — LOSARTAN POTASSIUM 25 MILLIGRAM(S): 100 TABLET, FILM COATED ORAL at 05:38

## 2020-01-04 RX ADMIN — SENNA PLUS 2 TABLET(S): 8.6 TABLET ORAL at 22:04

## 2020-01-04 RX ADMIN — CLOPIDOGREL BISULFATE 75 MILLIGRAM(S): 75 TABLET, FILM COATED ORAL at 13:01

## 2020-01-04 RX ADMIN — Medication 1334 MILLIGRAM(S): at 13:01

## 2020-01-04 RX ADMIN — Medication 81 MILLIGRAM(S): at 13:01

## 2020-01-04 RX ADMIN — CARVEDILOL PHOSPHATE 12.5 MILLIGRAM(S): 80 CAPSULE, EXTENDED RELEASE ORAL at 22:02

## 2020-01-04 RX ADMIN — Medication 1 MILLIGRAM(S): at 13:02

## 2020-01-04 RX ADMIN — DULOXETINE HYDROCHLORIDE 20 MILLIGRAM(S): 30 CAPSULE, DELAYED RELEASE ORAL at 22:03

## 2020-01-04 RX ADMIN — Medication 6 UNIT(S): at 09:04

## 2020-01-04 RX ADMIN — SODIUM CHLORIDE 3 MILLILITER(S): 9 INJECTION INTRAMUSCULAR; INTRAVENOUS; SUBCUTANEOUS at 13:06

## 2020-01-04 NOTE — PROGRESS NOTE ADULT - PROBLEM SELECTOR PLAN 1
Will continue current insulin regimen for now. Will continue monitoring FS, log, will Follow up.  For DC home start on Prandin 1mg PO before each meal, FS Log, FU

## 2020-01-04 NOTE — PROGRESS NOTE ADULT - SUBJECTIVE AND OBJECTIVE BOX
Patient denies CP, SOB Review of systems otherwise (-)         acetaminophen   Tablet .. 650 milliGRAM(s) Oral every 6 hours PRN  aspirin enteric coated 81 milliGRAM(s) Oral daily  atorvastatin 20 milliGRAM(s) Oral at bedtime  calcitriol   Capsule 0.25 MICROGram(s) Oral daily  calcium acetate 1334 milliGRAM(s) Oral three times a day with meals  carvedilol 12.5 milliGRAM(s) Oral every 12 hours  chlorhexidine 4% Liquid 1 Application(s) Topical <User Schedule>  clopidogrel Tablet 75 milliGRAM(s) Oral daily  dextrose 40% Gel 15 Gram(s) Oral once PRN  dextrose 5%. 1000 milliLiter(s) IV Continuous <Continuous>  dextrose 50% Injectable 12.5 Gram(s) IV Push once  dextrose 50% Injectable 25 Gram(s) IV Push once  dextrose 50% Injectable 25 Gram(s) IV Push once  DULoxetine 20 milliGRAM(s) Oral two times a day  folic acid 1 milliGRAM(s) Oral daily  glucagon  Injectable 1 milliGRAM(s) IntraMuscular once PRN  insulin glargine Injectable (LANTUS) 8 Unit(s) SubCutaneous at bedtime  insulin lispro (HumaLOG) corrective regimen sliding scale   SubCutaneous three times a day before meals  insulin lispro Injectable (HumaLOG) 6 Unit(s) SubCutaneous three times a day before meals  losartan 25 milliGRAM(s) Oral daily  polyethylene glycol 3350 17 Gram(s) Oral two times a day PRN  senna 2 Tablet(s) Oral at bedtime  sodium chloride 0.9% lock flush 3 milliLiter(s) IV Push every 8 hours                            11.6   7.13  )-----------( 173      ( 04 Jan 2020 06:30 )             37.4       Hemoglobin: 11.6 g/dL (01-04 @ 06:30)  Hemoglobin: 12.7 g/dL (01-03 @ 07:10)  Hemoglobin: 12.4 g/dL (01-02 @ 06:25)  Hemoglobin: 12.9 g/dL (01-01 @ 09:00)  Hemoglobin: 12.7 g/dL (12-31 @ 09:15)      01-04    135  |  97<L>  |  40<H>  ----------------------------<  190<H>  4.7   |  25  |  6.61<H>    Ca    10.4      04 Jan 2020 06:30  Phos  3.6     01-04  Mg     2.1     01-04      Creatinine Trend: 6.61<--, 4.92<--, 6.41<--, 6.49<--, 5.32<--, 9.25<--    COAGS:       PHYSICAL EXAM:  Vital Signs last 24 Hours   T(C): 36.7 (01-04-20 @ 14:23), Max: 36.7 (01-03-20 @ 17:00)  HR: 66 (01-04-20 @ 14:23) (61 - 66)  BP: 136/82 (01-04-20 @ 14:23) (136/82 - 154/53)  RR: 18 (01-04-20 @ 14:23) (16 - 18)  SpO2: 97% (01-04-20 @ 14:23) (97% - 100%)  Wt(kg): --    I&O's Summary      Gen: NAD well developed well nourished   HEENT:  (-)icterus (-)pallor  CV: N S1 S2 1/6 COCO (+)2 Pulses B/l  Resp:  Clear to auscultation B/L, normal effort  GI: (+) BS Soft, NT, ND  Lymph:  (-)Edema, (-)obvious lymphadenopathy, L aka  Skin: Warm to touch, Normal turgor  Psych: Appropriate mood and affect      ECG:  	SR / SB, PVCs       < from: Transthoracic Echocardiogram (04.04.16 @ 15:01) >  CONCLUSIONS:  1. Normal left ventricular systolic function. No segmental  wall motion abnormalities.  2. Mild diastolic dysfunction (Stage I).  3. Normal right ventricular size and function.    < end of copied text >  RADIOLOGY:    CXR:  No infiltrate       < from: Cardiac Cath Lab - Adult (12.30.19 @ 15:26) >  DIAGNOSTIC RECOMMENDATIONS: Coronary angiogram demonstratesmultivessel  CAD. Will consult CT surgery for surgical opinion. If the patient is not a  candidate for CABG, will consider PCI.  Prepared and signed by  Dom Gonsales M.D.         ASSESSMENT/PLAN: 	    74y Female PMhx of HTN, HLD, CAD, s/p PCI to LCX in 2015, recent Ischemia noted on stress in 9/19 cancelled cath due to flu, DM, osteo arthritis and ESRD (Tu,Th,Sat, on Dialysis Left AKA, admitted with fall and hyperglycemia found to have abnormal stress test ( moderate to severe ischemia in the mid anterior, anterolateral and inferolateral walls)  with historically normal LV function on TTE in 2016:  12/31 s/p LHC via RFA LCx 95% RICKEY x1      - CAD - cath performed 12/30 with CX/LAD/RCA disease , s/p RICKEY to LCX , cont with DAPT   - non-obstructive cad in LAD with DEONTE 3 flow; culprit lesion was the 95% proximal cx lesion; therefore will medically manage LAD stenosis for now; can consider staged PCI in the future if clinically indicated  -c/w asa/plavix/statin/coreg/losartan    - HD per renal -hyponatremia noted - no objection to dc from renal perspective   - Glycemic control per endo   - HD stable no evidence CHF  - RRA without hematoma or ecchymosis, palpable pulses   - pending PT reccs rehab, pt in agreement, d/c when accepted and bed available   - follow up with Dr Odom 1/13 @ 1045am for cardio

## 2020-01-04 NOTE — PROGRESS NOTE ADULT - SUBJECTIVE AND OBJECTIVE BOX
[facility] - [place] / Ximena SCOTT /Abhinav/ TYLER Saenz/ TYLER Russell/ Harjit Arriaza / KIERSTEN Njeru  ---------------------------------------------------------------------------------------------------------------    Patient seen and examined bedside    Subjective and Objective: No overnight events, denied sob. No complaints today.     Allergies: No Known Allergies      Hospital Medications:   MEDICATIONS  (STANDING):  aspirin enteric coated 81 milliGRAM(s) Oral daily  atorvastatin 20 milliGRAM(s) Oral at bedtime  calcitriol   Capsule 0.25 MICROGram(s) Oral daily  calcium acetate 1334 milliGRAM(s) Oral three times a day with meals  carvedilol 12.5 milliGRAM(s) Oral every 12 hours  chlorhexidine 4% Liquid 1 Application(s) Topical <User Schedule>  clopidogrel Tablet 75 milliGRAM(s) Oral daily  dextrose 5%. 1000 milliLiter(s) (50 mL/Hr) IV Continuous <Continuous>  dextrose 50% Injectable 12.5 Gram(s) IV Push once  dextrose 50% Injectable 25 Gram(s) IV Push once  dextrose 50% Injectable 25 Gram(s) IV Push once  DULoxetine 20 milliGRAM(s) Oral two times a day  folic acid 1 milliGRAM(s) Oral daily  insulin glargine Injectable (LANTUS) 8 Unit(s) SubCutaneous at bedtime  insulin lispro (HumaLOG) corrective regimen sliding scale   SubCutaneous three times a day before meals  insulin lispro Injectable (HumaLOG) 6 Unit(s) SubCutaneous three times a day before meals  losartan 25 milliGRAM(s) Oral daily  senna 2 Tablet(s) Oral at bedtime  sodium chloride 0.9% lock flush 3 milliLiter(s) IV Push every 8 hours    VITALS:  T(F): 98.1 (01-04-20 @ 14:23), Max: 98.1 (01-04-20 @ 14:23)  HR: 66 (01-04-20 @ 14:23)  BP: 136/82 (01-04-20 @ 14:23)  RR: 18 (01-04-20 @ 14:23)  SpO2: 97% (01-04-20 @ 14:23)  Wt(kg): --        PHYSICAL EXAM:  Constitutional: NAD  HEENT: anicteric sclera, oropharynx clear  Neck: No JVD  Respiratory: CTAB, no wheezes, rales or rhonchi  Cardiovascular: S1, S2, RRR  Gastrointestinal: BS+, soft, NT/ND  Extremities: No cyanosis or clubbing. No peripheral edema  Neurological: A/O x 3, no focal deficits  Psychiatric: Normal mood, normal affect  : No kemp.   Vascular Access: chest AVG +    LABS:  01-04    135  |  97<L>  |  40<H>  ----------------------------<  190<H>  4.7   |  25  |  6.61<H>    Ca    10.4      04 Jan 2020 06:30  Phos  3.6     01-04  Mg     2.1     01-04      Creatinine Trend: 6.61 <--, 4.92 <--, 6.41 <--, 6.49 <--, 5.32 <--, 9.25 <--, 8.07 <--, 6.18 <--                        11.6   7.13  )-----------( 173      ( 04 Jan 2020 06:30 )             37.4     Urine Studies:        RADIOLOGY & ADDITIONAL STUDIES:

## 2020-01-04 NOTE — PROGRESS NOTE ADULT - SUBJECTIVE AND OBJECTIVE BOX
Patient is a 74y old  Female who presents with a chief complaint of Weakness, hyperglycemia (03 Jan 2020 14:06)      SUBJECTIVE / OVERNIGHT EVENTS: overnight events noted    ROS:  Resp: No cough no sputum production  CVS: No chest pain no palpitations no orthopnea  GI: no N/V/D  : no dysuria, no hematuria  Neuro: no weakness no paresthesias  Heme: No petechiae no easy bruising  Msk: No joint pain no swelling  Skin: No rash no itching        MEDICATIONS  (STANDING):  aspirin enteric coated 81 milliGRAM(s) Oral daily  atorvastatin 20 milliGRAM(s) Oral at bedtime  calcitriol   Capsule 0.25 MICROGram(s) Oral daily  calcium acetate 1334 milliGRAM(s) Oral three times a day with meals  carvedilol 12.5 milliGRAM(s) Oral every 12 hours  chlorhexidine 4% Liquid 1 Application(s) Topical <User Schedule>  clopidogrel Tablet 75 milliGRAM(s) Oral daily  dextrose 5%. 1000 milliLiter(s) (50 mL/Hr) IV Continuous <Continuous>  dextrose 50% Injectable 12.5 Gram(s) IV Push once  dextrose 50% Injectable 25 Gram(s) IV Push once  dextrose 50% Injectable 25 Gram(s) IV Push once  DULoxetine 20 milliGRAM(s) Oral two times a day  folic acid 1 milliGRAM(s) Oral daily  insulin glargine Injectable (LANTUS) 8 Unit(s) SubCutaneous at bedtime  insulin lispro (HumaLOG) corrective regimen sliding scale   SubCutaneous three times a day before meals  insulin lispro Injectable (HumaLOG) 6 Unit(s) SubCutaneous three times a day before meals  losartan 25 milliGRAM(s) Oral daily  senna 2 Tablet(s) Oral at bedtime  sodium chloride 0.9% lock flush 3 milliLiter(s) IV Push every 8 hours    MEDICATIONS  (PRN):  acetaminophen   Tablet .. 650 milliGRAM(s) Oral every 6 hours PRN Mild Pain (1 - 3), Moderate Pain (4 - 6), Severe Pain (7 - 10)  dextrose 40% Gel 15 Gram(s) Oral once PRN Blood Glucose LESS THAN 70 milliGRAM(s)/deciliter  glucagon  Injectable 1 milliGRAM(s) IntraMuscular once PRN Glucose LESS THAN 70 milligrams/deciliter  polyethylene glycol 3350 17 Gram(s) Oral two times a day PRN Constipation        CAPILLARY BLOOD GLUCOSE      POCT Blood Glucose.: 200 mg/dL (04 Jan 2020 12:33)  POCT Blood Glucose.: 186 mg/dL (04 Jan 2020 08:34)  POCT Blood Glucose.: 149 mg/dL (03 Jan 2020 21:18)  POCT Blood Glucose.: 231 mg/dL (03 Jan 2020 17:16)    I&O's Summary      Vital Signs Last 24 Hrs  T(C): 36.7 (04 Jan 2020 14:23), Max: 36.7 (03 Jan 2020 17:00)  T(F): 98.1 (04 Jan 2020 14:23), Max: 98.1 (04 Jan 2020 14:23)  HR: 66 (04 Jan 2020 14:23) (61 - 66)  BP: 136/82 (04 Jan 2020 14:23) (136/82 - 154/53)  BP(mean): --  RR: 18 (04 Jan 2020 14:23) (16 - 18)  SpO2: 97% (04 Jan 2020 14:23) (97% - 100%)    PHYSICAL EXAM:  GENERAL: NAD, well-developed  HEAD:  Atraumatic, Normocephalic  EYES: EOMI, PERRLA, conjunctiva and sclera clear  NECK: Supple, No JVD  CHEST/LUNG: Clear to auscultation bilaterally; No wheeze  HEART: S1S2; holosystolic soft murmur best heard at left sternal border no rub no gallop   ABDOMEN: Soft, Nontender, Nondistended; Bowel sounds present  EXTREMITIES:  + Peripheral Pulses, No clubbing or cyanosis, no edema right leg  left BKA  PSYCH: AO x 3,   NEUROLOGY: Alert, no focal motor or sensory deficits  SKIN: No rashes or lesions    LABS:                        11.6   7.13  )-----------( 173      ( 04 Jan 2020 06:30 )             37.4     01-04    135  |  97<L>  |  40<H>  ----------------------------<  190<H>  4.7   |  25  |  6.61<H>    Ca    10.4      04 Jan 2020 06:30  Phos  3.6     01-04  Mg     2.1     01-04                  All consultant(s) notes reviewed and care discussed with other providers        Contact Number, Dr Styles [phone]

## 2020-01-04 NOTE — PROGRESS NOTE ADULT - SUBJECTIVE AND OBJECTIVE BOX
Chief complaint  Patient is a 74y old  Female who presents with a chief complaint of Weakness, hyperglycemia (03 Jan 2020 14:06)   Review of systems  Patient in bed, looks comfortable, no fever, no hypoglycemia.    Labs and Fingersticks  CAPILLARY BLOOD GLUCOSE      POCT Blood Glucose.: 186 mg/dL (04 Jan 2020 08:34)  POCT Blood Glucose.: 149 mg/dL (03 Jan 2020 21:18)  POCT Blood Glucose.: 231 mg/dL (03 Jan 2020 17:16)  POCT Blood Glucose.: 279 mg/dL (03 Jan 2020 12:43)      Anion Gap, Serum: 13 (01-04 @ 06:30)  Anion Gap, Serum: 12 (01-03 @ 07:10)  Anion Gap, Serum: 13 (01-02 @ 12:10)      Calcium, Total Serum: 10.4 (01-04 @ 06:30)  Calcium, Total Serum: 10.0 (01-03 @ 07:10)  Calcium, Total Serum: 9.7 (01-02 @ 12:10)          01-04    135  |  97<L>  |  40<H>  ----------------------------<  190<H>  4.7   |  25  |  6.61<H>    Ca    10.4      04 Jan 2020 06:30  Phos  3.6     01-04  Mg     2.1     01-04                          11.6   7.13  )-----------( 173      ( 04 Jan 2020 06:30 )             37.4     Medications  MEDICATIONS  (STANDING):  aspirin enteric coated 81 milliGRAM(s) Oral daily  atorvastatin 20 milliGRAM(s) Oral at bedtime  calcitriol   Capsule 0.25 MICROGram(s) Oral daily  calcium acetate 1334 milliGRAM(s) Oral three times a day with meals  carvedilol 12.5 milliGRAM(s) Oral every 12 hours  chlorhexidine 4% Liquid 1 Application(s) Topical <User Schedule>  clopidogrel Tablet 75 milliGRAM(s) Oral daily  dextrose 5%. 1000 milliLiter(s) (50 mL/Hr) IV Continuous <Continuous>  dextrose 50% Injectable 12.5 Gram(s) IV Push once  dextrose 50% Injectable 25 Gram(s) IV Push once  dextrose 50% Injectable 25 Gram(s) IV Push once  DULoxetine 20 milliGRAM(s) Oral two times a day  folic acid 1 milliGRAM(s) Oral daily  insulin glargine Injectable (LANTUS) 8 Unit(s) SubCutaneous at bedtime  insulin lispro (HumaLOG) corrective regimen sliding scale   SubCutaneous three times a day before meals  insulin lispro Injectable (HumaLOG) 6 Unit(s) SubCutaneous three times a day before meals  losartan 25 milliGRAM(s) Oral daily  senna 2 Tablet(s) Oral at bedtime  sodium chloride 0.9% lock flush 3 milliLiter(s) IV Push every 8 hours      Physical Exam  General: Patient comfortable in bed  Vital Signs Last 12 Hrs  T(F): 98 (01-04-20 @ 05:36), Max: 98 (01-04-20 @ 05:36)  HR: 62 (01-04-20 @ 05:36) (62 - 62)  BP: 139/55 (01-04-20 @ 05:36) (139/55 - 139/55)  BP(mean): --  RR: 16 (01-04-20 @ 05:36) (16 - 16)  SpO2: 100% (01-04-20 @ 05:36) (100% - 100%)  Neck: No palpable thyroid nodules.  CVS: S1S2, No murmurs  Respiratory: No wheezing, no crepitations  GI: Abdomen soft, bowel sounds positive  Musculoskeletal:  BKA left, edema lower extremities.   Skin: No skin rashes, no ecchymosis    Diagnostics

## 2020-01-04 NOTE — PROGRESS NOTE ADULT - ASSESSMENT
ESRD ON HD,  outpt TTS  K, vol ok  hypercalcemia  hyponatremia -resolved  no EPO AS  HB above goal.   HTN, controlled.   CAD s/p PCI w/LCx stent  DM- mx per primary team    labs, chart reviewed    high Ca- d/c calcitriol po. switch phoslo to renvela 2 tidac  scheduled for next HD today w/2k bath, no heparin, inc uf 1.5kg as tolearted  c/w BB, ARB  c/w renal restrictions in diet  stable for d/c renal stand point back to rehab  f/w w/SW

## 2020-01-04 NOTE — PROGRESS NOTE ADULT - ASSESSMENT
Assessment  DMT2: 74y Female with DM T2 with hypoglycemia, was on oral meds and on insulin at home that caused hypoglycemia in the setting of ESRD,  now on low dose basal bolus insulin, blood sugars trending down, she is eating full meals now.  Chest pain: Being worked up, monitored, stable.  HTN:  Controlled,  on antihypertensive medications.  ESRD: On hemodialysis, Monitor labs/BMP            Alexander Saenz MD  Cell: [phone]  Office: [phone]

## 2020-01-05 LAB
ANION GAP SERPL CALC-SCNC: 10 MMO/L — SIGNIFICANT CHANGE UP (ref 7–14)
BASOPHILS # BLD AUTO: 0.05 K/UL — SIGNIFICANT CHANGE UP (ref 0–0.2)
BASOPHILS NFR BLD AUTO: 0.8 % — SIGNIFICANT CHANGE UP (ref 0–2)
BUN SERPL-MCNC: 20 MG/DL — SIGNIFICANT CHANGE UP (ref 7–23)
CALCIUM SERPL-MCNC: 9.7 MG/DL — SIGNIFICANT CHANGE UP (ref 8.4–10.5)
CHLORIDE SERPL-SCNC: 95 MMOL/L — LOW (ref 98–107)
CO2 SERPL-SCNC: 28 MMOL/L — SIGNIFICANT CHANGE UP (ref 22–31)
CREAT SERPL-MCNC: 4.18 MG/DL — HIGH (ref 0.5–1.3)
EOSINOPHIL # BLD AUTO: 0.69 K/UL — HIGH (ref 0–0.5)
EOSINOPHIL NFR BLD AUTO: 10.6 % — HIGH (ref 0–6)
GLUCOSE BLDC GLUCOMTR-MCNC: 113 MG/DL — HIGH (ref 70–99)
GLUCOSE BLDC GLUCOMTR-MCNC: 152 MG/DL — HIGH (ref 70–99)
GLUCOSE BLDC GLUCOMTR-MCNC: 164 MG/DL — HIGH (ref 70–99)
GLUCOSE BLDC GLUCOMTR-MCNC: 171 MG/DL — HIGH (ref 70–99)
GLUCOSE SERPL-MCNC: 186 MG/DL — HIGH (ref 70–99)
HBV CORE AB SER-ACNC: NONREACTIVE — SIGNIFICANT CHANGE UP
HBV CORE IGM SER-ACNC: NONREACTIVE — SIGNIFICANT CHANGE UP
HBV SURFACE AB SER-ACNC: 191.7 MLU/ML — SIGNIFICANT CHANGE UP
HCT VFR BLD CALC: 37.8 % — SIGNIFICANT CHANGE UP (ref 34.5–45)
HGB BLD-MCNC: 11.7 G/DL — SIGNIFICANT CHANGE UP (ref 11.5–15.5)
IMM GRANULOCYTES NFR BLD AUTO: 0.3 % — SIGNIFICANT CHANGE UP (ref 0–1.5)
LYMPHOCYTES # BLD AUTO: 1.18 K/UL — SIGNIFICANT CHANGE UP (ref 1–3.3)
LYMPHOCYTES # BLD AUTO: 18.2 % — SIGNIFICANT CHANGE UP (ref 13–44)
MAGNESIUM SERPL-MCNC: 2.1 MG/DL — SIGNIFICANT CHANGE UP (ref 1.6–2.6)
MCHC RBC-ENTMCNC: 29.2 PG — SIGNIFICANT CHANGE UP (ref 27–34)
MCHC RBC-ENTMCNC: 31 % — LOW (ref 32–36)
MCV RBC AUTO: 94.3 FL — SIGNIFICANT CHANGE UP (ref 80–100)
MONOCYTES # BLD AUTO: 0.71 K/UL — SIGNIFICANT CHANGE UP (ref 0–0.9)
MONOCYTES NFR BLD AUTO: 10.9 % — SIGNIFICANT CHANGE UP (ref 2–14)
NEUTROPHILS # BLD AUTO: 3.84 K/UL — SIGNIFICANT CHANGE UP (ref 1.8–7.4)
NEUTROPHILS NFR BLD AUTO: 59.2 % — SIGNIFICANT CHANGE UP (ref 43–77)
NRBC # FLD: 0 K/UL — SIGNIFICANT CHANGE UP (ref 0–0)
PHOSPHATE SERPL-MCNC: 2.9 MG/DL — SIGNIFICANT CHANGE UP (ref 2.5–4.5)
PLATELET # BLD AUTO: 166 K/UL — SIGNIFICANT CHANGE UP (ref 150–400)
PMV BLD: 10.6 FL — SIGNIFICANT CHANGE UP (ref 7–13)
POTASSIUM SERPL-MCNC: 4.2 MMOL/L — SIGNIFICANT CHANGE UP (ref 3.5–5.3)
POTASSIUM SERPL-SCNC: 4.2 MMOL/L — SIGNIFICANT CHANGE UP (ref 3.5–5.3)
RBC # BLD: 4.01 M/UL — SIGNIFICANT CHANGE UP (ref 3.8–5.2)
RBC # FLD: 15.5 % — HIGH (ref 10.3–14.5)
SODIUM SERPL-SCNC: 133 MMOL/L — LOW (ref 135–145)
WBC # BLD: 6.49 K/UL — SIGNIFICANT CHANGE UP (ref 3.8–10.5)
WBC # FLD AUTO: 6.49 K/UL — SIGNIFICANT CHANGE UP (ref 3.8–10.5)

## 2020-01-05 RX ADMIN — DULOXETINE HYDROCHLORIDE 20 MILLIGRAM(S): 30 CAPSULE, DELAYED RELEASE ORAL at 17:43

## 2020-01-05 RX ADMIN — ATORVASTATIN CALCIUM 20 MILLIGRAM(S): 80 TABLET, FILM COATED ORAL at 22:16

## 2020-01-05 RX ADMIN — Medication 6 UNIT(S): at 13:03

## 2020-01-05 RX ADMIN — CARVEDILOL PHOSPHATE 12.5 MILLIGRAM(S): 80 CAPSULE, EXTENDED RELEASE ORAL at 17:43

## 2020-01-05 RX ADMIN — CLOPIDOGREL BISULFATE 75 MILLIGRAM(S): 75 TABLET, FILM COATED ORAL at 12:56

## 2020-01-05 RX ADMIN — Medication 1 MILLIGRAM(S): at 12:57

## 2020-01-05 RX ADMIN — SEVELAMER CARBONATE 1600 MILLIGRAM(S): 2400 POWDER, FOR SUSPENSION ORAL at 09:22

## 2020-01-05 RX ADMIN — SODIUM CHLORIDE 3 MILLILITER(S): 9 INJECTION INTRAMUSCULAR; INTRAVENOUS; SUBCUTANEOUS at 06:22

## 2020-01-05 RX ADMIN — SODIUM CHLORIDE 3 MILLILITER(S): 9 INJECTION INTRAMUSCULAR; INTRAVENOUS; SUBCUTANEOUS at 22:14

## 2020-01-05 RX ADMIN — DULOXETINE HYDROCHLORIDE 20 MILLIGRAM(S): 30 CAPSULE, DELAYED RELEASE ORAL at 06:21

## 2020-01-05 RX ADMIN — INSULIN GLARGINE 8 UNIT(S): 100 INJECTION, SOLUTION SUBCUTANEOUS at 22:16

## 2020-01-05 RX ADMIN — CHLORHEXIDINE GLUCONATE 1 APPLICATION(S): 213 SOLUTION TOPICAL at 09:22

## 2020-01-05 RX ADMIN — SENNA PLUS 2 TABLET(S): 8.6 TABLET ORAL at 22:16

## 2020-01-05 RX ADMIN — Medication 1: at 13:02

## 2020-01-05 RX ADMIN — Medication 81 MILLIGRAM(S): at 12:56

## 2020-01-05 RX ADMIN — SEVELAMER CARBONATE 1600 MILLIGRAM(S): 2400 POWDER, FOR SUSPENSION ORAL at 12:56

## 2020-01-05 RX ADMIN — Medication 6 UNIT(S): at 09:21

## 2020-01-05 RX ADMIN — LOSARTAN POTASSIUM 25 MILLIGRAM(S): 100 TABLET, FILM COATED ORAL at 06:21

## 2020-01-05 RX ADMIN — SEVELAMER CARBONATE 1600 MILLIGRAM(S): 2400 POWDER, FOR SUSPENSION ORAL at 17:42

## 2020-01-05 RX ADMIN — Medication 1: at 09:22

## 2020-01-05 RX ADMIN — SODIUM CHLORIDE 3 MILLILITER(S): 9 INJECTION INTRAMUSCULAR; INTRAVENOUS; SUBCUTANEOUS at 13:03

## 2020-01-05 RX ADMIN — Medication 6 UNIT(S): at 17:41

## 2020-01-05 RX ADMIN — Medication 1: at 17:41

## 2020-01-05 RX ADMIN — CARVEDILOL PHOSPHATE 12.5 MILLIGRAM(S): 80 CAPSULE, EXTENDED RELEASE ORAL at 06:21

## 2020-01-05 NOTE — PROVIDER CONTACT NOTE (MEDICATION) - ACTION/TREATMENT ORDERED:
Tele PA Danita Leonard notified/aware. Awaiting response for medication coverage.
Restart tomorrow based on slinding scale

## 2020-01-05 NOTE — PROGRESS NOTE ADULT - ASSESSMENT
74 year old female with PMH HTN, HLD, CAD (PCI/stent LCx), PVD, CHF, IDDM, and ESRD (Tu,Th,Sat, on Dialysis last completed on Saturday) and PSH ANIKA HICKS who presented to  [facility] on 12/26 with c/o high blood glucose and mechanical fall transferred to [facility] for coronary angiogram

## 2020-01-05 NOTE — PROVIDER CONTACT NOTE (MEDICATION) - SITUATION
Patient Vannesa was due Humulog at bedtime she was off of the unit when medication was due, when patient return to the unit she started eating dinner so I contacted the PA on call  Jeanette Garcia  and was told to hold medication, however she was given the lantus as coverage. her Fingerstick was 162.

## 2020-01-05 NOTE — PROVIDER CONTACT NOTE (MEDICATION) - RECOMMENDATIONS
Needs medication coverage at this time.
was told to hold medication humolog as per TYLER Garcia and start tomorrow based on sliding scale

## 2020-01-05 NOTE — PROGRESS NOTE ADULT - ASSESSMENT
Assessment  DMT2: 74y Female with DM T2 with hypoglycemia, was on oral meds and on insulin at home that caused hypoglycemia in the setting of ESRD,  now on low dose basal bolus insulin,  FS improving, she is eating full meals now.  Chest pain: Being worked up, monitored, stable.  HTN:  Controlled,  on antihypertensive medications.  ESRD: On hemodialysis, Monitor labs/BMP            Alexander Saenz MD  Cell: [phone]  Office: [phone]

## 2020-01-05 NOTE — PROGRESS NOTE ADULT - SUBJECTIVE AND OBJECTIVE BOX
Chief complaint  Patient is a 74y old  Female who presents with a chief complaint of Weakness, hyperglycemia (05 Jan 2020 07:34)   Review of systems  Patient in bed, looks comfortable, no fever, no hypoglycemia.    Labs and Fingersticks  CAPILLARY BLOOD GLUCOSE      POCT Blood Glucose.: 171 mg/dL (05 Jan 2020 08:33)  POCT Blood Glucose.: 162 mg/dL (04 Jan 2020 22:41)  POCT Blood Glucose.: 147 mg/dL (04 Jan 2020 18:51)  POCT Blood Glucose.: 200 mg/dL (04 Jan 2020 12:33)      Anion Gap, Serum: 10 (01-05 @ 07:55)  Anion Gap, Serum: 13 (01-04 @ 06:30)      Calcium, Total Serum: 9.7 (01-05 @ 07:55)  Calcium, Total Serum: 10.4 (01-04 @ 06:30)          01-05    133<L>  |  95<L>  |  20  ----------------------------<  186<H>  4.2   |  28  |  4.18<H>    Ca    9.7      05 Jan 2020 07:55  Phos  2.9     01-05  Mg     2.1     01-05                          11.7   6.49  )-----------( 166      ( 05 Jan 2020 07:55 )             37.8     Medications  MEDICATIONS  (STANDING):  aspirin enteric coated 81 milliGRAM(s) Oral daily  atorvastatin 20 milliGRAM(s) Oral at bedtime  carvedilol 12.5 milliGRAM(s) Oral every 12 hours  chlorhexidine 4% Liquid 1 Application(s) Topical <User Schedule>  clopidogrel Tablet 75 milliGRAM(s) Oral daily  dextrose 5%. 1000 milliLiter(s) (50 mL/Hr) IV Continuous <Continuous>  dextrose 50% Injectable 12.5 Gram(s) IV Push once  dextrose 50% Injectable 25 Gram(s) IV Push once  dextrose 50% Injectable 25 Gram(s) IV Push once  DULoxetine 20 milliGRAM(s) Oral two times a day  folic acid 1 milliGRAM(s) Oral daily  insulin glargine Injectable (LANTUS) 8 Unit(s) SubCutaneous at bedtime  insulin lispro (HumaLOG) corrective regimen sliding scale   SubCutaneous three times a day before meals  insulin lispro Injectable (HumaLOG) 6 Unit(s) SubCutaneous three times a day before meals  losartan 25 milliGRAM(s) Oral daily  senna 2 Tablet(s) Oral at bedtime  sevelamer carbonate 1600 milliGRAM(s) Oral three times a day with meals  sodium chloride 0.9% lock flush 3 milliLiter(s) IV Push every 8 hours      Physical Exam  General: Patient comfortable in bed  Vital Signs Last 12 Hrs  T(F): 97.6 (01-05-20 @ 06:13), Max: 97.6 (01-05-20 @ 06:13)  HR: 58 (01-05-20 @ 06:13) (58 - 58)  BP: 130/44 (01-05-20 @ 06:13) (130/44 - 130/44)  BP(mean): --  RR: 17 (01-05-20 @ 06:13) (17 - 17)  SpO2: 98% (01-05-20 @ 06:13) (98% - 98%)  Neck: No palpable thyroid nodules.  CVS: S1S2, No murmurs  Respiratory: No wheezing, no crepitations  GI: Abdomen soft, bowel sounds positive  Musculoskeletal:  edema lower extremities.   Skin: No skin rashes, no ecchymosis    Diagnostics

## 2020-01-05 NOTE — PROGRESS NOTE ADULT - SUBJECTIVE AND OBJECTIVE BOX
pt seen and examined, no complaints, ROS - .          acetaminophen   Tablet .. 650 milliGRAM(s) Oral every 6 hours PRN  aspirin enteric coated 81 milliGRAM(s) Oral daily  atorvastatin 20 milliGRAM(s) Oral at bedtime  carvedilol 12.5 milliGRAM(s) Oral every 12 hours  chlorhexidine 4% Liquid 1 Application(s) Topical <User Schedule>  clopidogrel Tablet 75 milliGRAM(s) Oral daily  dextrose 40% Gel 15 Gram(s) Oral once PRN  dextrose 5%. 1000 milliLiter(s) IV Continuous <Continuous>  dextrose 50% Injectable 12.5 Gram(s) IV Push once  dextrose 50% Injectable 25 Gram(s) IV Push once  dextrose 50% Injectable 25 Gram(s) IV Push once  DULoxetine 20 milliGRAM(s) Oral two times a day  folic acid 1 milliGRAM(s) Oral daily  glucagon  Injectable 1 milliGRAM(s) IntraMuscular once PRN  insulin glargine Injectable (LANTUS) 8 Unit(s) SubCutaneous at bedtime  insulin lispro (HumaLOG) corrective regimen sliding scale   SubCutaneous three times a day before meals  insulin lispro Injectable (HumaLOG) 6 Unit(s) SubCutaneous three times a day before meals  losartan 25 milliGRAM(s) Oral daily  polyethylene glycol 3350 17 Gram(s) Oral two times a day PRN  senna 2 Tablet(s) Oral at bedtime  sevelamer carbonate 1600 milliGRAM(s) Oral three times a day with meals  sodium chloride 0.9% lock flush 3 milliLiter(s) IV Push every 8 hours                            11.6   7.13  )-----------( 173      ( 04 Jan 2020 06:30 )             37.4       Hemoglobin: 11.6 g/dL (01-04 @ 06:30)  Hemoglobin: 12.7 g/dL (01-03 @ 07:10)  Hemoglobin: 12.4 g/dL (01-02 @ 06:25)  Hemoglobin: 12.9 g/dL (01-01 @ 09:00)  Hemoglobin: 12.7 g/dL (12-31 @ 09:15)      01-04    135  |  97<L>  |  40<H>  ----------------------------<  190<H>  4.7   |  25  |  6.61<H>    Ca    10.4      04 Jan 2020 06:30  Phos  3.6     01-04  Mg     2.1     01-04      Creatinine Trend: 6.61<--, 4.92<--, 6.41<--, 6.49<--, 5.32<--, 9.25<--    COAGS:           T(C): 36.4 (01-05-20 @ 06:13), Max: 36.7 (01-04-20 @ 14:23)  HR: 58 (01-05-20 @ 06:13) (56 - 79)  BP: 130/44 (01-05-20 @ 06:13) (102/67 - 148/52)  RR: 17 (01-05-20 @ 06:13) (17 - 18)  SpO2: 98% (01-05-20 @ 06:13) (97% - 98%)  Wt(kg): --    I&O's Summary    04 Jan 2020 07:01  -  05 Jan 2020 07:00  --------------------------------------------------------  IN: 500 mL / OUT: 1650 mL / NET: -1150 mL        Gen: NAD well developed well nourished   HEENT:  (-)icterus (-)pallor  CV: N S1 S2 1/6 COCO (+)2 Pulses B/l  Resp:  Clear to auscultation B/L, normal effort  GI: (+) BS Soft, NT, ND  Lymph:  (-)Edema, (-)obvious lymphadenopathy, L aka  Skin: Warm to touch, Normal turgor  Psych: Appropriate mood and affect      ECG:  	SR / SB, PVCs       < from: Transthoracic Echocardiogram (04.04.16 @ 15:01) >  CONCLUSIONS:  1. Normal left ventricular systolic function. No segmental  wall motion abnormalities.  2. Mild diastolic dysfunction (Stage I).  3. Normal right ventricular size and function.    < end of copied text >  RADIOLOGY:    CXR:  No infiltrate       < from: Cardiac Cath Lab - Adult (12.30.19 @ 15:26) >  DIAGNOSTIC RECOMMENDATIONS: Coronary angiogram demonstratesmultivessel  CAD. Will consult CT surgery for surgical opinion. If the patient is not a  candidate for CABG, will consider PCI.  Prepared and signed by  Dom Gonsales M.D.         ASSESSMENT/PLAN: 	    74y Female PMhx of HTN, HLD, CAD, s/p PCI to LCX in 2015, recent Ischemia noted on stress in 9/19 cancelled cath due to flu, DM, osteo arthritis and ESRD (Tu,Th,Sat, on Dialysis Left AKA, admitted with fall and hyperglycemia found to have abnormal stress test ( moderate to severe ischemia in the mid anterior, anterolateral and inferolateral walls)  with historically normal LV function on TTE in 2016:  12/31 s/p LHC via RFA LCx 95% RICKEY x1    - dapt for RICKEY to cx   - medical managemet for lad lession   - Cont BB, ACE  - HD per renal ,   - Glycemic control   - D/C pending ,  d/c when accepted and bed available   - follow up with Dr Odom 1/13 @ 1045am for cardio  D/W Dr Gonsales

## 2020-01-05 NOTE — PROVIDER CONTACT NOTE (MEDICATION) - BACKGROUND
73 y/o female, with a PmHx of HTN, HLD, CAD (PCI/stent LCx), PVD, CHF, IDDM and ESRD (Tu,Th,Sat, on Dialysis last completed on Saturday) and PSH ANIKA HICKS, transferred from [facility] to [facility] for cardiac cath.

## 2020-01-05 NOTE — PROVIDER CONTACT NOTE (MEDICATION) - ASSESSMENT
Pt is presently resting, awakened for med pass. No report of pain or discomfort. Otherwise stable condition.
patient is asymptomatic will continue to monitor.

## 2020-01-05 NOTE — PROGRESS NOTE ADULT - SUBJECTIVE AND OBJECTIVE BOX
Patient is a 74y old  Female who presents with a chief complaint of Weakness, hyperglycemia (05 Jan 2020 07:34)      SUBJECTIVE / OVERNIGHT EVENTS: overnight events noted    ROS:  Resp: No cough no sputum production  CVS: No chest pain no palpitations no orthopnea  GI: no N/V/D  : no dysuria, no hematuria  Neuro: no weakness no paresthesias  Heme: No petechiae no easy bruising  Msk: No joint pain no swelling  Skin: No rash no itching        MEDICATIONS  (STANDING):  aspirin enteric coated 81 milliGRAM(s) Oral daily  atorvastatin 20 milliGRAM(s) Oral at bedtime  carvedilol 12.5 milliGRAM(s) Oral every 12 hours  chlorhexidine 4% Liquid 1 Application(s) Topical <User Schedule>  clopidogrel Tablet 75 milliGRAM(s) Oral daily  dextrose 5%. 1000 milliLiter(s) (50 mL/Hr) IV Continuous <Continuous>  dextrose 50% Injectable 12.5 Gram(s) IV Push once  dextrose 50% Injectable 25 Gram(s) IV Push once  dextrose 50% Injectable 25 Gram(s) IV Push once  DULoxetine 20 milliGRAM(s) Oral two times a day  folic acid 1 milliGRAM(s) Oral daily  insulin glargine Injectable (LANTUS) 8 Unit(s) SubCutaneous at bedtime  insulin lispro (HumaLOG) corrective regimen sliding scale   SubCutaneous three times a day before meals  insulin lispro Injectable (HumaLOG) 6 Unit(s) SubCutaneous three times a day before meals  losartan 25 milliGRAM(s) Oral daily  senna 2 Tablet(s) Oral at bedtime  sevelamer carbonate 1600 milliGRAM(s) Oral three times a day with meals  sodium chloride 0.9% lock flush 3 milliLiter(s) IV Push every 8 hours    MEDICATIONS  (PRN):  acetaminophen   Tablet .. 650 milliGRAM(s) Oral every 6 hours PRN Mild Pain (1 - 3), Moderate Pain (4 - 6), Severe Pain (7 - 10)  dextrose 40% Gel 15 Gram(s) Oral once PRN Blood Glucose LESS THAN 70 milliGRAM(s)/deciliter  glucagon  Injectable 1 milliGRAM(s) IntraMuscular once PRN Glucose LESS THAN 70 milligrams/deciliter  polyethylene glycol 3350 17 Gram(s) Oral two times a day PRN Constipation        CAPILLARY BLOOD GLUCOSE      POCT Blood Glucose.: 164 mg/dL (05 Jan 2020 12:40)  POCT Blood Glucose.: 171 mg/dL (05 Jan 2020 08:33)  POCT Blood Glucose.: 162 mg/dL (04 Jan 2020 22:41)  POCT Blood Glucose.: 147 mg/dL (04 Jan 2020 18:51)    I&O's Summary    04 Jan 2020 07:01  -  05 Jan 2020 07:00  --------------------------------------------------------  IN: 500 mL / OUT: 1650 mL / NET: -1150 mL        Vital Signs Last 24 Hrs  T(C): 36.6 (05 Jan 2020 13:09), Max: 36.7 (04 Jan 2020 17:46)  T(F): 97.9 (05 Jan 2020 13:09), Max: 98.1 (04 Jan 2020 17:46)  HR: 59 (05 Jan 2020 13:09) (56 - 79)  BP: 128/41 (05 Jan 2020 13:09) (102/67 - 148/52)  BP(mean): 92 (05 Jan 2020 13:09) (92 - 92)  RR: 17 (05 Jan 2020 13:09) (17 - 18)  SpO2: 96% (05 Jan 2020 13:09) (96% - 98%)    PHYSICAL EXAM:  GENERAL: NAD, well-developed  HEAD:  Atraumatic, Normocephalic  EYES: EOMI, PERRLA, conjunctiva and sclera clear  NECK: Supple, No JVD  CHEST/LUNG: Clear to auscultation bilaterally; No wheeze  HEART: S1S2; holosystolic soft murmur best heard at left sternal border no rub no gallop   ABDOMEN: Soft, Nontender, Nondistended; Bowel sounds present  EXTREMITIES:  + Peripheral Pulses, No clubbing or cyanosis, no edema right leg  left BKA  PSYCH: AO x 3,   NEUROLOGY: Alert, no focal motor or sensory deficits  SKIN: No rashes or lesions    LABS:                        11.7   6.49  )-----------( 166      ( 05 Jan 2020 07:55 )             37.8     01-05    133<L>  |  95<L>  |  20  ----------------------------<  186<H>  4.2   |  28  |  4.18<H>    Ca    9.7      05 Jan 2020 07:55  Phos  2.9     01-05  Mg     2.1     01-05                  All consultant(s) notes reviewed and care discussed with other providers        Contact Number, Dr Styles [phone]

## 2020-01-06 LAB
ALBUMIN SERPL ELPH-MCNC: 3.5 G/DL — SIGNIFICANT CHANGE UP (ref 3.3–5)
ALP SERPL-CCNC: 68 U/L — SIGNIFICANT CHANGE UP (ref 40–120)
ALT FLD-CCNC: 11 U/L — SIGNIFICANT CHANGE UP (ref 4–33)
ANION GAP SERPL CALC-SCNC: 15 MMO/L — HIGH (ref 7–14)
AST SERPL-CCNC: 15 U/L — SIGNIFICANT CHANGE UP (ref 4–32)
BASOPHILS # BLD AUTO: 0.05 K/UL — SIGNIFICANT CHANGE UP (ref 0–0.2)
BASOPHILS NFR BLD AUTO: 0.7 % — SIGNIFICANT CHANGE UP (ref 0–2)
BILIRUB DIRECT SERPL-MCNC: < 0.2 MG/DL — SIGNIFICANT CHANGE UP (ref 0.1–0.2)
BILIRUB SERPL-MCNC: < 0.2 MG/DL — LOW (ref 0.2–1.2)
BUN SERPL-MCNC: 35 MG/DL — HIGH (ref 7–23)
CALCIUM SERPL-MCNC: 10.2 MG/DL — SIGNIFICANT CHANGE UP (ref 8.4–10.5)
CHLORIDE SERPL-SCNC: 93 MMOL/L — LOW (ref 98–107)
CO2 SERPL-SCNC: 26 MMOL/L — SIGNIFICANT CHANGE UP (ref 22–31)
CREAT SERPL-MCNC: 5.74 MG/DL — HIGH (ref 0.5–1.3)
EOSINOPHIL # BLD AUTO: 0.77 K/UL — HIGH (ref 0–0.5)
EOSINOPHIL NFR BLD AUTO: 10.6 % — HIGH (ref 0–6)
GLUCOSE BLDC GLUCOMTR-MCNC: 110 MG/DL — HIGH (ref 70–99)
GLUCOSE BLDC GLUCOMTR-MCNC: 140 MG/DL — HIGH (ref 70–99)
GLUCOSE BLDC GLUCOMTR-MCNC: 173 MG/DL — HIGH (ref 70–99)
GLUCOSE BLDC GLUCOMTR-MCNC: 87 MG/DL — SIGNIFICANT CHANGE UP (ref 70–99)
GLUCOSE SERPL-MCNC: 154 MG/DL — HIGH (ref 70–99)
HCT VFR BLD CALC: 39.5 % — SIGNIFICANT CHANGE UP (ref 34.5–45)
HGB BLD-MCNC: 12.3 G/DL — SIGNIFICANT CHANGE UP (ref 11.5–15.5)
IMM GRANULOCYTES NFR BLD AUTO: 0.3 % — SIGNIFICANT CHANGE UP (ref 0–1.5)
LYMPHOCYTES # BLD AUTO: 1.6 K/UL — SIGNIFICANT CHANGE UP (ref 1–3.3)
LYMPHOCYTES # BLD AUTO: 22 % — SIGNIFICANT CHANGE UP (ref 13–44)
MAGNESIUM SERPL-MCNC: 2 MG/DL — SIGNIFICANT CHANGE UP (ref 1.6–2.6)
MCHC RBC-ENTMCNC: 28.9 PG — SIGNIFICANT CHANGE UP (ref 27–34)
MCHC RBC-ENTMCNC: 31.1 % — LOW (ref 32–36)
MCV RBC AUTO: 92.9 FL — SIGNIFICANT CHANGE UP (ref 80–100)
MONOCYTES # BLD AUTO: 0.73 K/UL — SIGNIFICANT CHANGE UP (ref 0–0.9)
MONOCYTES NFR BLD AUTO: 10 % — SIGNIFICANT CHANGE UP (ref 2–14)
NEUTROPHILS # BLD AUTO: 4.1 K/UL — SIGNIFICANT CHANGE UP (ref 1.8–7.4)
NEUTROPHILS NFR BLD AUTO: 56.4 % — SIGNIFICANT CHANGE UP (ref 43–77)
NRBC # FLD: 0 K/UL — SIGNIFICANT CHANGE UP (ref 0–0)
PHOSPHATE SERPL-MCNC: 3.8 MG/DL — SIGNIFICANT CHANGE UP (ref 2.5–4.5)
PLATELET # BLD AUTO: 190 K/UL — SIGNIFICANT CHANGE UP (ref 150–400)
PMV BLD: 11.1 FL — SIGNIFICANT CHANGE UP (ref 7–13)
POTASSIUM SERPL-MCNC: 4.4 MMOL/L — SIGNIFICANT CHANGE UP (ref 3.5–5.3)
POTASSIUM SERPL-SCNC: 4.4 MMOL/L — SIGNIFICANT CHANGE UP (ref 3.5–5.3)
PROT SERPL-MCNC: 6.4 G/DL — SIGNIFICANT CHANGE UP (ref 6–8.3)
RBC # BLD: 4.25 M/UL — SIGNIFICANT CHANGE UP (ref 3.8–5.2)
RBC # FLD: 15.4 % — HIGH (ref 10.3–14.5)
SODIUM SERPL-SCNC: 134 MMOL/L — LOW (ref 135–145)
WBC # BLD: 7.27 K/UL — SIGNIFICANT CHANGE UP (ref 3.8–10.5)
WBC # FLD AUTO: 7.27 K/UL — SIGNIFICANT CHANGE UP (ref 3.8–10.5)

## 2020-01-06 RX ADMIN — SODIUM CHLORIDE 3 MILLILITER(S): 9 INJECTION INTRAMUSCULAR; INTRAVENOUS; SUBCUTANEOUS at 21:32

## 2020-01-06 RX ADMIN — SENNA PLUS 2 TABLET(S): 8.6 TABLET ORAL at 21:32

## 2020-01-06 RX ADMIN — CHLORHEXIDINE GLUCONATE 1 APPLICATION(S): 213 SOLUTION TOPICAL at 11:48

## 2020-01-06 RX ADMIN — SEVELAMER CARBONATE 1600 MILLIGRAM(S): 2400 POWDER, FOR SUSPENSION ORAL at 17:42

## 2020-01-06 RX ADMIN — CLOPIDOGREL BISULFATE 75 MILLIGRAM(S): 75 TABLET, FILM COATED ORAL at 11:49

## 2020-01-06 RX ADMIN — INSULIN GLARGINE 8 UNIT(S): 100 INJECTION, SOLUTION SUBCUTANEOUS at 21:32

## 2020-01-06 RX ADMIN — Medication 1 MILLIGRAM(S): at 11:49

## 2020-01-06 RX ADMIN — Medication 1: at 08:51

## 2020-01-06 RX ADMIN — Medication 81 MILLIGRAM(S): at 11:49

## 2020-01-06 RX ADMIN — LOSARTAN POTASSIUM 25 MILLIGRAM(S): 100 TABLET, FILM COATED ORAL at 06:11

## 2020-01-06 RX ADMIN — Medication 6 UNIT(S): at 13:00

## 2020-01-06 RX ADMIN — SODIUM CHLORIDE 3 MILLILITER(S): 9 INJECTION INTRAMUSCULAR; INTRAVENOUS; SUBCUTANEOUS at 06:08

## 2020-01-06 RX ADMIN — DULOXETINE HYDROCHLORIDE 20 MILLIGRAM(S): 30 CAPSULE, DELAYED RELEASE ORAL at 06:09

## 2020-01-06 RX ADMIN — SODIUM CHLORIDE 3 MILLILITER(S): 9 INJECTION INTRAMUSCULAR; INTRAVENOUS; SUBCUTANEOUS at 11:51

## 2020-01-06 RX ADMIN — CARVEDILOL PHOSPHATE 12.5 MILLIGRAM(S): 80 CAPSULE, EXTENDED RELEASE ORAL at 17:42

## 2020-01-06 RX ADMIN — ATORVASTATIN CALCIUM 20 MILLIGRAM(S): 80 TABLET, FILM COATED ORAL at 21:32

## 2020-01-06 RX ADMIN — Medication 6 UNIT(S): at 08:50

## 2020-01-06 RX ADMIN — Medication 6 UNIT(S): at 17:42

## 2020-01-06 RX ADMIN — CARVEDILOL PHOSPHATE 12.5 MILLIGRAM(S): 80 CAPSULE, EXTENDED RELEASE ORAL at 06:11

## 2020-01-06 RX ADMIN — DULOXETINE HYDROCHLORIDE 20 MILLIGRAM(S): 30 CAPSULE, DELAYED RELEASE ORAL at 17:42

## 2020-01-06 RX ADMIN — SEVELAMER CARBONATE 1600 MILLIGRAM(S): 2400 POWDER, FOR SUSPENSION ORAL at 11:49

## 2020-01-06 RX ADMIN — SEVELAMER CARBONATE 1600 MILLIGRAM(S): 2400 POWDER, FOR SUSPENSION ORAL at 08:52

## 2020-01-06 NOTE — PROGRESS NOTE ADULT - ASSESSMENT
74 year old female with PMH HTN, HLD, CAD (PCI/stent LCx), PVD, CHF, IDDM, and ESRD (Tu,Th,Sat, on Dialysis last completed on Saturday) and P[facility] ANIKA HICKS who presented to  [facility] on 12/26 with c/o high blood glucose and mechanical fall transferred to [facility] for coronary angiogram

## 2020-01-06 NOTE — PROGRESS NOTE ADULT - ASSESSMENT
ESRD ON HD,  outpt TTS  K, vol ok  hypercalcemia- off VDA  hyponatremia -resolved  no EPO AS  HB above goal.   HTN, controlled.   CAD s/p PCI w/LCx stent  DM- mx per primary team    labs, chart reviewed    high Ca- d/lois calcitriol po. switched phoslo to renvela 2 tidac, continue  scheduled for next HD tomorrow w/2k, 2 ca bath, no heparin, uf 1.5kg as tolearted  c/w BB, ARB  c/w renal restrictions in diet  f/w w/SW

## 2020-01-06 NOTE — PROGRESS NOTE ADULT - ASSESSMENT
Assessment  DMT2: 74y Female with DM T2 with hypoglycemia, was on oral meds and on insulin at home that caused hypoglycemia in the setting of ESRD,  on insulin, blood sugars trending in acceptable range, she is eating full meals now.  Chest pain: Being worked up, monitored, stable.  HTN:  Controlled,  on antihypertensive medications.  ESRD: On hemodialysis, Monitor labs/BMP            Alexander Saenz MD  Cell: [phone]  Office: [phone]

## 2020-01-06 NOTE — PROGRESS NOTE ADULT - SUBJECTIVE AND OBJECTIVE BOX
Chief complaint  Patient is a 74y old  Female who presents with a chief complaint of Weakness, hyperglycemia (05 Jan 2020 07:34)   Review of systems  Patient in bed, looks comfortable, no fever,  no hypoglycemia.    Labs and Fingersticks  CAPILLARY BLOOD GLUCOSE      POCT Blood Glucose.: 113 mg/dL (05 Jan 2020 21:39)  POCT Blood Glucose.: 152 mg/dL (05 Jan 2020 17:15)  POCT Blood Glucose.: 164 mg/dL (05 Jan 2020 12:40)  POCT Blood Glucose.: 171 mg/dL (05 Jan 2020 08:33)      Anion Gap, Serum: 10 (01-05 @ 07:55)      Calcium, Total Serum: 9.7 (01-05 @ 07:55)          01-05    133<L>  |  95<L>  |  20  ----------------------------<  186<H>  4.2   |  28  |  4.18<H>    Ca    9.7      05 Jan 2020 07:55  Phos  2.9     01-05  Mg     2.1     01-05                          12.3   7.27  )-----------( 190      ( 06 Jan 2020 06:40 )             39.5     Medications  MEDICATIONS  (STANDING):  aspirin enteric coated 81 milliGRAM(s) Oral daily  atorvastatin 20 milliGRAM(s) Oral at bedtime  carvedilol 12.5 milliGRAM(s) Oral every 12 hours  chlorhexidine 4% Liquid 1 Application(s) Topical <User Schedule>  clopidogrel Tablet 75 milliGRAM(s) Oral daily  dextrose 5%. 1000 milliLiter(s) (50 mL/Hr) IV Continuous <Continuous>  dextrose 50% Injectable 12.5 Gram(s) IV Push once  dextrose 50% Injectable 25 Gram(s) IV Push once  dextrose 50% Injectable 25 Gram(s) IV Push once  DULoxetine 20 milliGRAM(s) Oral two times a day  folic acid 1 milliGRAM(s) Oral daily  insulin glargine Injectable (LANTUS) 8 Unit(s) SubCutaneous at bedtime  insulin lispro (HumaLOG) corrective regimen sliding scale   SubCutaneous three times a day before meals  insulin lispro Injectable (HumaLOG) 6 Unit(s) SubCutaneous three times a day before meals  losartan 25 milliGRAM(s) Oral daily  senna 2 Tablet(s) Oral at bedtime  sevelamer carbonate 1600 milliGRAM(s) Oral three times a day with meals  sodium chloride 0.9% lock flush 3 milliLiter(s) IV Push every 8 hours      Physical Exam  General: Patient comfortable in bed  Vital Signs Last 12 Hrs  T(F): 97.8 (01-06-20 @ 06:12), Max: 98.2 (01-05-20 @ 21:35)  HR: 59 (01-06-20 @ 06:12) (59 - 65)  BP: 134/59 (01-06-20 @ 06:12) (134/59 - 135/53)  BP(mean): --  RR: 16 (01-06-20 @ 06:12) (16 - 18)  SpO2: 96% (01-06-20 @ 06:12) (96% - 100%)  Neck: No palpable thyroid nodules.  CVS: S1S2, No murmurs  Respiratory: No wheezing, no crepitations  GI: Abdomen soft, bowel sounds positive  Musculoskeletal:  edema lower extremities.   Skin: No skin rashes, no ecchymosis    Diagnostics

## 2020-01-06 NOTE — PROGRESS NOTE ADULT - SUBJECTIVE AND OBJECTIVE BOX
Patient is a 74y old  Female who presents with a chief complaint of Weakness, hyperglycemia (05 Jan 2020 07:34)      SUBJECTIVE / OVERNIGHT EVENTS: overnight events noted    ROS:  Resp: No cough no sputum production  CVS: No chest pain no palpitations no orthopnea  GI: no N/V/D  : no dysuria, no hematuria  Neuro: no weakness no paresthesias  Heme: No petechiae no easy bruising  Msk: No joint pain no swelling  Skin: No rash no itching        MEDICATIONS  (STANDING):  aspirin enteric coated 81 milliGRAM(s) Oral daily  atorvastatin 20 milliGRAM(s) Oral at bedtime  carvedilol 12.5 milliGRAM(s) Oral every 12 hours  chlorhexidine 4% Liquid 1 Application(s) Topical <User Schedule>  clopidogrel Tablet 75 milliGRAM(s) Oral daily  dextrose 5%. 1000 milliLiter(s) (50 mL/Hr) IV Continuous <Continuous>  dextrose 50% Injectable 12.5 Gram(s) IV Push once  dextrose 50% Injectable 25 Gram(s) IV Push once  dextrose 50% Injectable 25 Gram(s) IV Push once  DULoxetine 20 milliGRAM(s) Oral two times a day  folic acid 1 milliGRAM(s) Oral daily  insulin glargine Injectable (LANTUS) 8 Unit(s) SubCutaneous at bedtime  insulin lispro (HumaLOG) corrective regimen sliding scale   SubCutaneous three times a day before meals  insulin lispro Injectable (HumaLOG) 6 Unit(s) SubCutaneous three times a day before meals  losartan 25 milliGRAM(s) Oral daily  senna 2 Tablet(s) Oral at bedtime  sevelamer carbonate 1600 milliGRAM(s) Oral three times a day with meals  sodium chloride 0.9% lock flush 3 milliLiter(s) IV Push every 8 hours    MEDICATIONS  (PRN):  acetaminophen   Tablet .. 650 milliGRAM(s) Oral every 6 hours PRN Mild Pain (1 - 3), Moderate Pain (4 - 6), Severe Pain (7 - 10)  dextrose 40% Gel 15 Gram(s) Oral once PRN Blood Glucose LESS THAN 70 milliGRAM(s)/deciliter  glucagon  Injectable 1 milliGRAM(s) IntraMuscular once PRN Glucose LESS THAN 70 milligrams/deciliter  polyethylene glycol 3350 17 Gram(s) Oral two times a day PRN Constipation        CAPILLARY BLOOD GLUCOSE      POCT Blood Glucose.: 173 mg/dL (06 Jan 2020 08:36)  POCT Blood Glucose.: 113 mg/dL (05 Jan 2020 21:39)  POCT Blood Glucose.: 152 mg/dL (05 Jan 2020 17:15)  POCT Blood Glucose.: 164 mg/dL (05 Jan 2020 12:40)    I&O's Summary      Vital Signs Last 24 Hrs  T(C): 36.6 (06 Jan 2020 06:12), Max: 36.8 (05 Jan 2020 17:44)  T(F): 97.8 (06 Jan 2020 06:12), Max: 98.3 (05 Jan 2020 17:44)  HR: 59 (06 Jan 2020 06:12) (59 - 66)  BP: 134/59 (06 Jan 2020 06:12) (128/41 - 151/49)  BP(mean): 95 (05 Jan 2020 17:44) (92 - 95)  RR: 16 (06 Jan 2020 06:12) (16 - 18)  SpO2: 96% (06 Jan 2020 06:12) (96% - 100%)    PHYSICAL EXAM:  GENERAL: NAD, well-developed  HEAD:  Atraumatic, Normocephalic  EYES: EOMI, PERRLA, conjunctiva and sclera clear  NECK: Supple, No JVD  CHEST/LUNG: Clear to auscultation bilaterally; No wheeze  HEART: S1S2; holosystolic soft murmur best heard at left sternal border no rub no gallop   ABDOMEN: Soft, Nontender, Nondistended; Bowel sounds present  EXTREMITIES:  + Peripheral Pulses, No clubbing or cyanosis, no edema right leg  left BKA  PSYCH: AO x 3,   NEUROLOGY: Alert, no focal motor or sensory deficits  SKIN: No rashes or lesions    LABS:                        12.3   7.27  )-----------( 190      ( 06 Jan 2020 06:40 )             39.5     01-06    134<L>  |  93<L>  |  35<H>  ----------------------------<  154<H>  4.4   |  26  |  5.74<H>    Ca    10.2      06 Jan 2020 06:40  Phos  3.8     01-06  Mg     2.0     01-06                  All consultant(s) notes reviewed and care discussed with other providers        Contact Number, Dr Styles [phone]

## 2020-01-06 NOTE — PROGRESS NOTE ADULT - SUBJECTIVE AND OBJECTIVE BOX
S:  Denies cp or sob. Review of systems otherwise negative.          acetaminophen   Tablet .. 650 milliGRAM(s) Oral every 6 hours PRN  aspirin enteric coated 81 milliGRAM(s) Oral daily  atorvastatin 20 milliGRAM(s) Oral at bedtime  carvedilol 12.5 milliGRAM(s) Oral every 12 hours  chlorhexidine 4% Liquid 1 Application(s) Topical <User Schedule>  clopidogrel Tablet 75 milliGRAM(s) Oral daily  dextrose 40% Gel 15 Gram(s) Oral once PRN  dextrose 5%. 1000 milliLiter(s) IV Continuous <Continuous>  dextrose 50% Injectable 12.5 Gram(s) IV Push once  dextrose 50% Injectable 25 Gram(s) IV Push once  dextrose 50% Injectable 25 Gram(s) IV Push once  DULoxetine 20 milliGRAM(s) Oral two times a day  folic acid 1 milliGRAM(s) Oral daily  glucagon  Injectable 1 milliGRAM(s) IntraMuscular once PRN  insulin glargine Injectable (LANTUS) 8 Unit(s) SubCutaneous at bedtime  insulin lispro (HumaLOG) corrective regimen sliding scale   SubCutaneous three times a day before meals  insulin lispro Injectable (HumaLOG) 6 Unit(s) SubCutaneous three times a day before meals  losartan 25 milliGRAM(s) Oral daily  polyethylene glycol 3350 17 Gram(s) Oral two times a day PRN  senna 2 Tablet(s) Oral at bedtime  sevelamer carbonate 1600 milliGRAM(s) Oral three times a day with meals  sodium chloride 0.9% lock flush 3 milliLiter(s) IV Push every 8 hours                        12.3   7.27  )-----------( 190      ( 06 Jan 2020 06:40 )             39.5     Hemoglobin: 12.3 g/dL (01-06 @ 06:40)  Hemoglobin: 11.7 g/dL (01-05 @ 07:55)  Hemoglobin: 11.6 g/dL (01-04 @ 06:30)  Hemoglobin: 12.7 g/dL (01-03 @ 07:10)  Hemoglobin: 12.4 g/dL (01-02 @ 06:25)      01-06    134<L>  |  93<L>  |  35<H>  ----------------------------<  154<H>  4.4   |  26  |  5.74<H>    Ca    10.2      06 Jan 2020 06:40  Phos  3.8     01-06  Mg     2.0     01-06      Creatinine Trend: 5.74<--, 4.18<--, 6.61<--, 4.92<--, 6.41<--, 6.49<--    COAGS:       PHYSICAL EXAM:  Vital Signs last 24 Hours   T(C): 36.9 (01-06-20 @ 11:46), Max: 36.9 (01-06-20 @ 11:46)  HR: 56 (01-06-20 @ 11:46) (56 - 66)  BP: 119/46 (01-06-20 @ 11:46) (119/46 - 151/49)  RR: 18 (01-06-20 @ 11:46) (16 - 18)  SpO2: 99% (01-06-20 @ 11:46) (96% - 100%)  Wt(kg): --    I&O's Summary      Gen: NAD well developed well nourished   HEENT:  (-)icterus (-)pallor  CV: N S1 S2 1/6 COCO (+)2 Pulses B/l  Resp:  Clear to auscultation B/L, normal effort  GI: (+) BS Soft, NT, ND  Lymph:  (-)Edema, (-)obvious lymphadenopathy, L aka  Skin: Warm to touch, Normal turgor  Psych: Appropriate mood and affect      ECG:  	SR / SB, PVCs       < from: Transthoracic Echocardiogram (04.04.16 @ 15:01) >  CONCLUSIONS:  1. Normal left ventricular systolic function. No segmental  wall motion abnormalities.  2. Mild diastolic dysfunction (Stage I).  3. Normal right ventricular size and function.    < end of copied text >  RADIOLOGY:    CXR:  No infiltrate       < from: Cardiac Cath Lab - Adult (12.30.19 @ 15:26) >  DIAGNOSTIC RECOMMENDATIONS: Coronary angiogram demonstratesmultivessel  CAD. Will consult CT surgery for surgical opinion. If the patient is not a  candidate for CABG, will consider PCI.  Prepared and signed by  Dom Gonsales M.D.         ASSESSMENT/PLAN: 	    74y Female PMhx of HTN, HLD, CAD, s/p PCI to LCX in 2015, recent Ischemia noted on stress in 9/19 cancelled cath due to flu, DM, osteo arthritis and ESRD (Tu,Th,Sat, on Dialysis Left AKA, admitted with fall and hyperglycemia found to have abnormal stress test ( moderate to severe ischemia in the mid anterior, anterolateral and inferolateral walls)  with historically normal LV function on TTE in 2016:        - 12/31/19 s/p LHC via RFA LCx 95% RICKEY x1  - dapt for RICKEY to cx , medical management for lad lession   - Cont BB, ACE  - HD per renal    - Glycemic control   - D/C pending ,  d/c when accepted and bed available   - follow up with Dr Odom 1/13 @ 1045am for cardio

## 2020-01-06 NOTE — PROGRESS NOTE ADULT - SUBJECTIVE AND OBJECTIVE BOX
[facility] - Addis / Ximena SCOTT /Abhinav/ TYLER Saenz/ TYLER Russell/ Harjit Arriaza / KIERSTEN Njeru  ---------------------------------------------------------------------------------------------------------------    Patient seen and examined bedside    Subjective and Objective: No overnight events, denied sob. No complaints today.    Allergies: No Known Allergies      Hospital Medications:   MEDICATIONS  (STANDING):  aspirin enteric coated 81 milliGRAM(s) Oral daily  atorvastatin 20 milliGRAM(s) Oral at bedtime  carvedilol 12.5 milliGRAM(s) Oral every 12 hours  chlorhexidine 4% Liquid 1 Application(s) Topical <User Schedule>  clopidogrel Tablet 75 milliGRAM(s) Oral daily  dextrose 5%. 1000 milliLiter(s) (50 mL/Hr) IV Continuous <Continuous>  dextrose 50% Injectable 12.5 Gram(s) IV Push once  dextrose 50% Injectable 25 Gram(s) IV Push once  dextrose 50% Injectable 25 Gram(s) IV Push once  DULoxetine 20 milliGRAM(s) Oral two times a day  folic acid 1 milliGRAM(s) Oral daily  insulin glargine Injectable (LANTUS) 8 Unit(s) SubCutaneous at bedtime  insulin lispro (HumaLOG) corrective regimen sliding scale   SubCutaneous three times a day before meals  insulin lispro Injectable (HumaLOG) 6 Unit(s) SubCutaneous three times a day before meals  losartan 25 milliGRAM(s) Oral daily  senna 2 Tablet(s) Oral at bedtime  sevelamer carbonate 1600 milliGRAM(s) Oral three times a day with meals  sodium chloride 0.9% lock flush 3 milliLiter(s) IV Push every 8 hours      VITALS:  T(F): 98.4 (01-06-20 @ 11:46), Max: 98.4 (01-06-20 @ 11:46)  HR: 56 (01-06-20 @ 11:46)  BP: 119/46 (01-06-20 @ 11:46)  RR: 18 (01-06-20 @ 11:46)  SpO2: 99% (01-06-20 @ 11:46)  Wt(kg): --      PHYSICAL EXAM:  Constitutional: NAD  HEENT: anicteric sclera, oropharynx clear  Neck: No JVD  Respiratory: CTAB, no wheezes, rales or rhonchi  Cardiovascular: S1, S2, RRR  Gastrointestinal: BS+, soft, NT/ND  Extremities: No cyanosis or clubbing. No peripheral edema  Neurological: A/O x 3, no focal deficits  Psychiatric: Normal mood, normal affect  : No kemp.   Vascular Access: chest AVG +    LABS:  01-06    134<L>  |  93<L>  |  35<H>  ----------------------------<  154<H>  4.4   |  26  |  5.74<H>    Ca    10.2      06 Jan 2020 06:40  Phos  3.8     01-06  Mg     2.0     01-06    TPro  6.4  /  Alb  3.5  /  TBili  < 0.2<L>  /  DBili  < 0.2  /  AST  15  /  ALT  11  /  AlkPhos  68  01-06    Creatinine Trend: 5.74 <--, 4.18 <--, 6.61 <--, 4.92 <--, 6.41 <--, 6.49 <--, 5.32 <--, 9.25 <--                        12.3   7.27  )-----------( 190      ( 06 Jan 2020 06:40 )             39.5     Urine Studies:        RADIOLOGY & ADDITIONAL STUDIES:

## 2020-01-07 ENCOUNTER — TRANSCRIPTION ENCOUNTER (OUTPATIENT)
Age: 75
End: 2020-01-07

## 2020-01-07 VITALS
HEART RATE: 59 BPM | RESPIRATION RATE: 18 BRPM | SYSTOLIC BLOOD PRESSURE: 96 MMHG | DIASTOLIC BLOOD PRESSURE: 44 MMHG | TEMPERATURE: 98 F | OXYGEN SATURATION: 99 %

## 2020-01-07 LAB
ANION GAP SERPL CALC-SCNC: 15 MMO/L — HIGH (ref 7–14)
BASOPHILS # BLD AUTO: 0.06 K/UL — SIGNIFICANT CHANGE UP (ref 0–0.2)
BASOPHILS NFR BLD AUTO: 0.8 % — SIGNIFICANT CHANGE UP (ref 0–2)
BUN SERPL-MCNC: 50 MG/DL — HIGH (ref 7–23)
CALCIUM SERPL-MCNC: 10.2 MG/DL — SIGNIFICANT CHANGE UP (ref 8.4–10.5)
CHLORIDE SERPL-SCNC: 95 MMOL/L — LOW (ref 98–107)
CO2 SERPL-SCNC: 25 MMOL/L — SIGNIFICANT CHANGE UP (ref 22–31)
CREAT SERPL-MCNC: 7.63 MG/DL — HIGH (ref 0.5–1.3)
EOSINOPHIL # BLD AUTO: 0.81 K/UL — HIGH (ref 0–0.5)
EOSINOPHIL NFR BLD AUTO: 10.7 % — HIGH (ref 0–6)
GLUCOSE BLDC GLUCOMTR-MCNC: 100 MG/DL — HIGH (ref 70–99)
GLUCOSE BLDC GLUCOMTR-MCNC: 107 MG/DL — HIGH (ref 70–99)
GLUCOSE BLDC GLUCOMTR-MCNC: 122 MG/DL — HIGH (ref 70–99)
GLUCOSE SERPL-MCNC: 113 MG/DL — HIGH (ref 70–99)
HAV IGM SER-ACNC: NONREACTIVE — SIGNIFICANT CHANGE UP
HBV CORE AB SER-ACNC: NONREACTIVE — SIGNIFICANT CHANGE UP
HBV CORE IGM SER-ACNC: NONREACTIVE — SIGNIFICANT CHANGE UP
HBV SURFACE AB SER-ACNC: 223.5 MLU/ML — SIGNIFICANT CHANGE UP
HCT VFR BLD CALC: 37.9 % — SIGNIFICANT CHANGE UP (ref 34.5–45)
HCV AB S/CO SERPL IA: 0.09 S/CO — SIGNIFICANT CHANGE UP (ref 0–0.99)
HCV AB SERPL-IMP: SIGNIFICANT CHANGE UP
HGB BLD-MCNC: 11.7 G/DL — SIGNIFICANT CHANGE UP (ref 11.5–15.5)
IMM GRANULOCYTES NFR BLD AUTO: 0.5 % — SIGNIFICANT CHANGE UP (ref 0–1.5)
LYMPHOCYTES # BLD AUTO: 1.55 K/UL — SIGNIFICANT CHANGE UP (ref 1–3.3)
LYMPHOCYTES # BLD AUTO: 20.4 % — SIGNIFICANT CHANGE UP (ref 13–44)
MAGNESIUM SERPL-MCNC: 2.1 MG/DL — SIGNIFICANT CHANGE UP (ref 1.6–2.6)
MCHC RBC-ENTMCNC: 28.9 PG — SIGNIFICANT CHANGE UP (ref 27–34)
MCHC RBC-ENTMCNC: 30.9 % — LOW (ref 32–36)
MCV RBC AUTO: 93.6 FL — SIGNIFICANT CHANGE UP (ref 80–100)
MONOCYTES # BLD AUTO: 0.77 K/UL — SIGNIFICANT CHANGE UP (ref 0–0.9)
MONOCYTES NFR BLD AUTO: 10.1 % — SIGNIFICANT CHANGE UP (ref 2–14)
NEUTROPHILS # BLD AUTO: 4.36 K/UL — SIGNIFICANT CHANGE UP (ref 1.8–7.4)
NEUTROPHILS NFR BLD AUTO: 57.5 % — SIGNIFICANT CHANGE UP (ref 43–77)
NRBC # FLD: 0 K/UL — SIGNIFICANT CHANGE UP (ref 0–0)
PHOSPHATE SERPL-MCNC: 4.6 MG/DL — HIGH (ref 2.5–4.5)
PLATELET # BLD AUTO: 195 K/UL — SIGNIFICANT CHANGE UP (ref 150–400)
PMV BLD: 10.9 FL — SIGNIFICANT CHANGE UP (ref 7–13)
POTASSIUM SERPL-MCNC: 4.9 MMOL/L — SIGNIFICANT CHANGE UP (ref 3.5–5.3)
POTASSIUM SERPL-SCNC: 4.9 MMOL/L — SIGNIFICANT CHANGE UP (ref 3.5–5.3)
RBC # BLD: 4.05 M/UL — SIGNIFICANT CHANGE UP (ref 3.8–5.2)
RBC # FLD: 15.5 % — HIGH (ref 10.3–14.5)
SODIUM SERPL-SCNC: 135 MMOL/L — SIGNIFICANT CHANGE UP (ref 135–145)
WBC # BLD: 7.59 K/UL — SIGNIFICANT CHANGE UP (ref 3.8–10.5)
WBC # FLD AUTO: 7.59 K/UL — SIGNIFICANT CHANGE UP (ref 3.8–10.5)

## 2020-01-07 RX ORDER — SENNA PLUS 8.6 MG/1
2 TABLET ORAL
Qty: 0 | Refills: 0 | DISCHARGE
Start: 2020-01-07

## 2020-01-07 RX ORDER — POLYETHYLENE GLYCOL 3350 17 G/17G
17 POWDER, FOR SOLUTION ORAL
Qty: 0 | Refills: 0 | DISCHARGE
Start: 2020-01-07

## 2020-01-07 RX ORDER — REPAGLINIDE 1 MG/1
1 TABLET ORAL
Qty: 0 | Refills: 0 | DISCHARGE

## 2020-01-07 RX ORDER — INSULIN GLARGINE 100 [IU]/ML
18 INJECTION, SOLUTION SUBCUTANEOUS
Qty: 0 | Refills: 0 | DISCHARGE

## 2020-01-07 RX ORDER — REPAGLINIDE 1 MG/1
1 TABLET ORAL
Qty: 0 | Refills: 0 | DISCHARGE
Start: 2020-01-07

## 2020-01-07 RX ORDER — INSULIN ASPART 100 [IU]/ML
4 INJECTION, SOLUTION SUBCUTANEOUS
Qty: 0 | Refills: 0 | DISCHARGE

## 2020-01-07 RX ADMIN — CLOPIDOGREL BISULFATE 75 MILLIGRAM(S): 75 TABLET, FILM COATED ORAL at 17:06

## 2020-01-07 RX ADMIN — SODIUM CHLORIDE 3 MILLILITER(S): 9 INJECTION INTRAMUSCULAR; INTRAVENOUS; SUBCUTANEOUS at 06:07

## 2020-01-07 RX ADMIN — CARVEDILOL PHOSPHATE 12.5 MILLIGRAM(S): 80 CAPSULE, EXTENDED RELEASE ORAL at 06:08

## 2020-01-07 RX ADMIN — SODIUM CHLORIDE 3 MILLILITER(S): 9 INJECTION INTRAMUSCULAR; INTRAVENOUS; SUBCUTANEOUS at 10:00

## 2020-01-07 RX ADMIN — CHLORHEXIDINE GLUCONATE 1 APPLICATION(S): 213 SOLUTION TOPICAL at 10:00

## 2020-01-07 RX ADMIN — SEVELAMER CARBONATE 1600 MILLIGRAM(S): 2400 POWDER, FOR SUSPENSION ORAL at 08:47

## 2020-01-07 RX ADMIN — LOSARTAN POTASSIUM 25 MILLIGRAM(S): 100 TABLET, FILM COATED ORAL at 06:08

## 2020-01-07 RX ADMIN — Medication 6 UNIT(S): at 15:22

## 2020-01-07 RX ADMIN — DULOXETINE HYDROCHLORIDE 20 MILLIGRAM(S): 30 CAPSULE, DELAYED RELEASE ORAL at 17:06

## 2020-01-07 RX ADMIN — Medication 81 MILLIGRAM(S): at 17:06

## 2020-01-07 RX ADMIN — Medication 6 UNIT(S): at 08:48

## 2020-01-07 RX ADMIN — DULOXETINE HYDROCHLORIDE 20 MILLIGRAM(S): 30 CAPSULE, DELAYED RELEASE ORAL at 06:08

## 2020-01-07 RX ADMIN — Medication 1 MILLIGRAM(S): at 17:06

## 2020-01-07 NOTE — CHART NOTE - NSCHARTNOTEFT_GEN_A_CORE
Spoke with Dr Saenz endocrine discharge pt with prandin 1mg TID. no need for insulin. Spoke with cardiology team, pt cleared for discharge

## 2020-01-07 NOTE — PROGRESS NOTE ADULT - SUBJECTIVE AND OBJECTIVE BOX
Chief complaint  Patient is a 74y old  Female who presents with a chief complaint of Weakness, hyperglycemia (07 Jan 2020 12:25)   Review of systems  Patient in bed, looks comfortable, no fever,  no hypoglycemia.    Labs and Fingersticks  CAPILLARY BLOOD GLUCOSE      POCT Blood Glucose.: 107 mg/dL (07 Jan 2020 15:19)  POCT Blood Glucose.: 100 mg/dL (07 Jan 2020 13:04)  POCT Blood Glucose.: 122 mg/dL (07 Jan 2020 08:44)  POCT Blood Glucose.: 87 mg/dL (06 Jan 2020 21:02)  POCT Blood Glucose.: 110 mg/dL (06 Jan 2020 17:08)      Anion Gap, Serum: 15 <H> (01-07 @ 07:45)  Anion Gap, Serum: 15 <H> (01-06 @ 06:40)      Calcium, Total Serum: 10.2 (01-07 @ 07:45)  Calcium, Total Serum: 10.2 (01-06 @ 06:40)  Albumin, Serum: 3.5 (01-06 @ 06:40)    Alanine Aminotransferase (ALT/SGPT): 11 (01-06 @ 06:40)  Alkaline Phosphatase, Serum: 68 (01-06 @ 06:40)  Aspartate Aminotransferase (AST/SGOT): 15 (01-06 @ 06:40)        01-07    135  |  95<L>  |  50<H>  ----------------------------<  113<H>  4.9   |  25  |  7.63<H>    Ca    10.2      07 Jan 2020 07:45  Phos  4.6     01-07  Mg     2.1     01-07    TPro  6.4  /  Alb  3.5  /  TBili  < 0.2<L>  /  DBili  < 0.2  /  AST  15  /  ALT  11  /  AlkPhos  68  01-06                        11.7   7.59  )-----------( 195      ( 07 Jan 2020 07:45 )             37.9     Medications  MEDICATIONS  (STANDING):  aspirin enteric coated 81 milliGRAM(s) Oral daily  atorvastatin 20 milliGRAM(s) Oral at bedtime  carvedilol 12.5 milliGRAM(s) Oral every 12 hours  chlorhexidine 4% Liquid 1 Application(s) Topical <User Schedule>  clopidogrel Tablet 75 milliGRAM(s) Oral daily  dextrose 5%. 1000 milliLiter(s) (50 mL/Hr) IV Continuous <Continuous>  dextrose 50% Injectable 12.5 Gram(s) IV Push once  dextrose 50% Injectable 25 Gram(s) IV Push once  dextrose 50% Injectable 25 Gram(s) IV Push once  DULoxetine 20 milliGRAM(s) Oral two times a day  folic acid 1 milliGRAM(s) Oral daily  insulin glargine Injectable (LANTUS) 8 Unit(s) SubCutaneous at bedtime  insulin lispro (HumaLOG) corrective regimen sliding scale   SubCutaneous three times a day before meals  insulin lispro Injectable (HumaLOG) 6 Unit(s) SubCutaneous three times a day before meals  losartan 25 milliGRAM(s) Oral daily  senna 2 Tablet(s) Oral at bedtime  sevelamer carbonate 1600 milliGRAM(s) Oral three times a day with meals  sodium chloride 0.9% lock flush 3 milliLiter(s) IV Push every 8 hours      Physical Exam  General: Patient comfortable in bed  Vital Signs Last 12 Hrs  T(F): 97.7 (01-07-20 @ 14:20), Max: 98.6 (01-07-20 @ 10:45)  HR: 57 (01-07-20 @ 14:20) (57 - 64)  BP: 126/60 (01-07-20 @ 14:20) (119/40 - 140/48)  BP(mean): --  RR: 16 (01-07-20 @ 14:20) (15 - 18)  SpO2: 96% (01-07-20 @ 10:31) (95% - 96%)  Neck: No palpable thyroid nodules.  CVS: S1S2, No murmurs  Respiratory: No wheezing, no crepitations  GI: Abdomen soft, bowel sounds positive  Musculoskeletal:  edema lower extremities.   Skin: No skin rashes, no ecchymosis    Diagnostics

## 2020-01-07 NOTE — PROGRESS NOTE ADULT - SUBJECTIVE AND OBJECTIVE BOX
S:  Seen at HD. Denies cp or sob. Review of systems otherwise negative.        MEDICATIONS  (STANDING):  aspirin enteric coated 81 milliGRAM(s) Oral daily  atorvastatin 20 milliGRAM(s) Oral at bedtime  carvedilol 12.5 milliGRAM(s) Oral every 12 hours  chlorhexidine 4% Liquid 1 Application(s) Topical <User Schedule>  clopidogrel Tablet 75 milliGRAM(s) Oral daily  dextrose 5%. 1000 milliLiter(s) (50 mL/Hr) IV Continuous <Continuous>  dextrose 50% Injectable 12.5 Gram(s) IV Push once  dextrose 50% Injectable 25 Gram(s) IV Push once  dextrose 50% Injectable 25 Gram(s) IV Push once  DULoxetine 20 milliGRAM(s) Oral two times a day  folic acid 1 milliGRAM(s) Oral daily  insulin glargine Injectable (LANTUS) 8 Unit(s) SubCutaneous at bedtime  insulin lispro (HumaLOG) corrective regimen sliding scale   SubCutaneous three times a day before meals  insulin lispro Injectable (HumaLOG) 6 Unit(s) SubCutaneous three times a day before meals  losartan 25 milliGRAM(s) Oral daily  senna 2 Tablet(s) Oral at bedtime  sevelamer carbonate 1600 milliGRAM(s) Oral three times a day with meals  sodium chloride 0.9% lock flush 3 milliLiter(s) IV Push every 8 hours    MEDICATIONS  (PRN):  acetaminophen   Tablet .. 650 milliGRAM(s) Oral every 6 hours PRN Mild Pain (1 - 3), Moderate Pain (4 - 6), Severe Pain (7 - 10)  dextrose 40% Gel 15 Gram(s) Oral once PRN Blood Glucose LESS THAN 70 milliGRAM(s)/deciliter  glucagon  Injectable 1 milliGRAM(s) IntraMuscular once PRN Glucose LESS THAN 70 milligrams/deciliter  polyethylene glycol 3350 17 Gram(s) Oral two times a day PRN Constipation      LABS:                            11.7   7.59  )-----------( 195      ( 07 Jan 2020 07:45 )             37.9     Hemoglobin: 11.7 g/dL (01-07 @ 07:45)  Hemoglobin: 12.3 g/dL (01-06 @ 06:40)  Hemoglobin: 11.7 g/dL (01-05 @ 07:55)  Hemoglobin: 11.6 g/dL (01-04 @ 06:30)  Hemoglobin: 12.7 g/dL (01-03 @ 07:10)    01-07    135  |  95<L>  |  50<H>  ----------------------------<  113<H>  4.9   |  25  |  7.63<H>    Ca    10.2      07 Jan 2020 07:45  Phos  4.6     01-07  Mg     2.1     01-07    TPro  6.4  /  Alb  3.5  /  TBili  < 0.2<L>  /  DBili  < 0.2  /  AST  15  /  ALT  11  /  AlkPhos  68  01-06    Creatinine Trend: 7.63<--, 5.74<--, 4.18<--, 6.61<--, 4.92<--, 6.41<--           PHYSICAL EXAM  Vital Signs Last 24 Hrs  T(C): 37 (07 Jan 2020 10:45), Max: 37.2 (06 Jan 2020 17:39)  T(F): 98.6 (07 Jan 2020 10:45), Max: 99 (06 Jan 2020 17:39)  HR: 60 (07 Jan 2020 10:45) (59 - 66)  BP: 129/52 (07 Jan 2020 10:45) (119/40 - 140/48)  BP(mean): --  RR: 15 (07 Jan 2020 10:45) (15 - 18)  SpO2: 96% (07 Jan 2020 10:31) (95% - 96%)          Gen: NAD well developed well nourished   HEENT:  (-)icterus (-)pallor  CV: N S1 S2 1/6 COCO s/p left bka  Resp:  Clear to auscultation B/L, normal effort  GI: (+) BS Soft, NT, ND  Lymph:  (-)Edema, (-)obvious lymphadenopathy, L aka  Skin: Warm to touch, Normal turgor  Psych: Appropriate mood and affect      ECG:  	SR / SB, PVCs     TELE : NSR 60s      < from: Transthoracic Echocardiogram (04.04.16 @ 15:01) >  CONCLUSIONS:  1. Normal left ventricular systolic function. No segmental  wall motion abnormalities.  2. Mild diastolic dysfunction (Stage I).  3. Normal right ventricular size and function.    < end of copied text >  RADIOLOGY:    CXR:  No infiltrate       < from: Cardiac Cath Lab - Adult (12.30.19 @ 15:26) >  LM:   --  LM: Angiography showed minor luminal irregularities with no flow  limiting lesions.  LAD:   --  Proximal LAD: There was a diffuse 70 % stenosis.  --  Mid LAD: There was a diffuse 40 % stenosis.  --  Distal LAD: Angiography showed mild atherosclerosis with no flow  limiting lesions.  --  D1: Angiography showed minor luminal irregularities with no flow  limiting lesions.  CX:   --  Proximal circumflex:There was a 95 % stenosis at the site of a  prior stent.  --  Mid circumflex: There was a diffuse 40 % stenosis.  --  Distal circumflex: Angiography showed minor luminal irregularities with  no flow limiting lesions.  --  OM1: Angiography showed minor luminal irregularities with no flow  limiting lesions.  --  OM2: Angiography showed minor luminal irregularities with no flow  limiting lesions.  RCA:   --  Proximal RCA: Angiography showed minor luminal irregularities  with no flow limiting lesions.  --  Mid RCA: Angiography showed minor luminal irregularities with no flow  limiting lesions. Mid RCA stent patent.  --  Distal RCA: Angiography showed mild atherosclerosis with no flow  limiting lesions.  --  RPDA: There was a discrete 60 % stenosis at the vessel ostium.  --  RPLS: There was a discrete 60 % stenosis at the vessel ostium.  COMPLICATIONS: There were no complications. No complications occurred  during the cath lab visit.  DIAGNOSTIC RECOMMENDATIONS: Coronary angiogram demonstratesmultivessel  CAD. Will consult CT surgery for surgical opinion. If the patient is not a  candidate for CABG, will consider PCI.    < end of copied text >           < from: Cardiac Cath Lab - Adult (12.31.19 @ 11:45) >  VENTRICLES: No LV gram was performed; however, a recent NST demonstrated an  EF of 50 %.  CORONARY VESSELS: Dominance was not assessed.  LM:   --  LM: This vessel was not injected, but was visualized during a  prior cardiac catheterization.  LAD:   --  LAD: This vessel was not injected, but was visualized during a  prior cardiac catheterization.  CX:   --  Proximal circumflex: There was a tubular 95 % stenosis.  RCA:   --  RCA: This vessel was not injected, but wasvisualized during a  prior cardiac catheterization.  COMPLICATIONS: There were no complications. No complications occurred  during the cath lab visit.  INTERVENTIONAL RECOMMENDATIONS: S/p successful RICKEY to the proximal  circumflex artery. The patientshould continue with dual antiplatelet  therapy for at least 12 months.    < end of copied text >      ASSESSMENT/PLAN: 	    74y Female PMhx of HTN, HLD, CAD, s/p PCI to LCX in 2015, recent Ischemia noted on stress in 9/19 cancelled cath due to flu, DM, osteo arthritis and ESRD (Tu,Th,Sat, on Dialysis Left AKA, admitted with fall and hyperglycemia found to have abnormal stress test ( moderate to severe ischemia in the mid anterior, anterolateral and inferolateral walls)  with historically normal LV function on TTE in 2016:        - 12/31/19 s/p LHC via RFA  with LCx 95%  s/p RICKEY x1  - CAD - DAPT for RICKEY to cx , medical management for lad lesion   - Cont Coreg/Losartan   - HD per renal  - appears euvolemic  - Glycemic control per endo  - D/C pending  insurance authorization /bed availability   - follow up with Dr Odom 1/13 @ 1045am for cardio

## 2020-01-07 NOTE — PROGRESS NOTE ADULT - REASON FOR ADMISSION
Weakness, hyperglycemia
weakness

## 2020-01-07 NOTE — PROGRESS NOTE ADULT - PROBLEM SELECTOR PLAN 2
s/p RICKEY Cx. LAD lesion, medical  Tr at present.  no chest pain,dyspnea, dizziness  Needs much better Glycemic control  Cardio, Endo eval noted

## 2020-01-07 NOTE — PROGRESS NOTE ADULT - SUBJECTIVE AND OBJECTIVE BOX
Patient is a 74y old  Female who presents with a chief complaint of Weakness, hyperglycemia (07 Jan 2020 12:25)      SUBJECTIVE / OVERNIGHT EVENTS: overnight events noted    ROS:  Resp: No cough no sputum production  CVS: No chest pain no palpitations no orthopnea  GI: no N/V/D  : no dysuria, no hematuria  Neuro: no weakness no paresthesias  Heme: No petechiae no easy bruising  Msk: No joint pain no swelling  Skin: No rash no itching        MEDICATIONS  (STANDING):  aspirin enteric coated 81 milliGRAM(s) Oral daily  atorvastatin 20 milliGRAM(s) Oral at bedtime  carvedilol 12.5 milliGRAM(s) Oral every 12 hours  chlorhexidine 4% Liquid 1 Application(s) Topical <User Schedule>  clopidogrel Tablet 75 milliGRAM(s) Oral daily  dextrose 5%. 1000 milliLiter(s) (50 mL/Hr) IV Continuous <Continuous>  dextrose 50% Injectable 12.5 Gram(s) IV Push once  dextrose 50% Injectable 25 Gram(s) IV Push once  dextrose 50% Injectable 25 Gram(s) IV Push once  DULoxetine 20 milliGRAM(s) Oral two times a day  folic acid 1 milliGRAM(s) Oral daily  insulin glargine Injectable (LANTUS) 8 Unit(s) SubCutaneous at bedtime  insulin lispro (HumaLOG) corrective regimen sliding scale   SubCutaneous three times a day before meals  insulin lispro Injectable (HumaLOG) 6 Unit(s) SubCutaneous three times a day before meals  losartan 25 milliGRAM(s) Oral daily  senna 2 Tablet(s) Oral at bedtime  sevelamer carbonate 1600 milliGRAM(s) Oral three times a day with meals  sodium chloride 0.9% lock flush 3 milliLiter(s) IV Push every 8 hours    MEDICATIONS  (PRN):  acetaminophen   Tablet .. 650 milliGRAM(s) Oral every 6 hours PRN Mild Pain (1 - 3), Moderate Pain (4 - 6), Severe Pain (7 - 10)  dextrose 40% Gel 15 Gram(s) Oral once PRN Blood Glucose LESS THAN 70 milliGRAM(s)/deciliter  glucagon  Injectable 1 milliGRAM(s) IntraMuscular once PRN Glucose LESS THAN 70 milligrams/deciliter  polyethylene glycol 3350 17 Gram(s) Oral two times a day PRN Constipation        CAPILLARY BLOOD GLUCOSE      POCT Blood Glucose.: 107 mg/dL (07 Jan 2020 15:19)  POCT Blood Glucose.: 100 mg/dL (07 Jan 2020 13:04)  POCT Blood Glucose.: 122 mg/dL (07 Jan 2020 08:44)  POCT Blood Glucose.: 87 mg/dL (06 Jan 2020 21:02)    I&O's Summary      Vital Signs Last 24 Hrs  T(C): 36.6 (07 Jan 2020 17:00), Max: 37.2 (06 Jan 2020 17:39)  T(F): 97.9 (07 Jan 2020 17:00), Max: 99 (06 Jan 2020 17:39)  HR: 59 (07 Jan 2020 17:00) (57 - 66)  BP: 96/44 (07 Jan 2020 17:00) (96/44 - 140/48)  BP(mean): --  RR: 18 (07 Jan 2020 17:00) (15 - 18)  SpO2: 99% (07 Jan 2020 17:00) (95% - 99%)    PHYSICAL EXAM:  GENERAL: NAD, well-developed  HEAD:  Atraumatic, Normocephalic  EYES: EOMI, PERRLA, conjunctiva and sclera clear  NECK: Supple, No JVD  CHEST/LUNG: Clear to auscultation bilaterally; No wheeze  HEART: S1S2; holosystolic soft murmur best heard at left sternal border no rub no gallop   ABDOMEN: Soft, Nontender, Nondistended; Bowel sounds present  EXTREMITIES:  + Peripheral Pulses, No clubbing or cyanosis, no edema right leg  left BKA  PSYCH: AO x 3,   NEUROLOGY: Alert, no focal motor or sensory deficits  SKIN: No rashes or lesions    LABS:                        11.7   7.59  )-----------( 195      ( 07 Jan 2020 07:45 )             37.9     01-07    135  |  95<L>  |  50<H>  ----------------------------<  113<H>  4.9   |  25  |  7.63<H>    Ca    10.2      07 Jan 2020 07:45  Phos  4.6     01-07  Mg     2.1     01-07    TPro  6.4  /  Alb  3.5  /  TBili  < 0.2<L>  /  DBili  < 0.2  /  AST  15  /  ALT  11  /  AlkPhos  68  01-06                All consultant(s) notes reviewed and care discussed with other providers        Contact Number, Dr Styles [phone]

## 2020-01-07 NOTE — DISCHARGE NOTE NURSING/CASE MANAGEMENT/SOCIAL WORK - PATIENT PORTAL LINK FT
You can access the FollowMyHealth Patient Portal offered by [facility] by registering at the following website: [email or web address]. By joining [facility]’s FollowMyHealth portal, you will also be able to view your health information using other applications (apps) compatible with our system.

## 2020-01-07 NOTE — PROGRESS NOTE ADULT - ASSESSMENT
Assessment  DMT2: 74y Female with DM T2 with hypoglycemia, was on oral meds and on insulin at home that caused hypoglycemia in the setting of ESRD, FS improved, planing DC home today.  Chest pain: Being worked up, monitored, stable.  HTN:  Controlled,  on antihypertensive medications.  ESRD: On hemodialysis, Monitor labs/BMP            Alexander Saenz MD  Cell: [phone]  Office: [phone]

## 2020-01-07 NOTE — PROGRESS NOTE ADULT - PROBLEM SELECTOR PLAN 1
Will continue current insulin regimen for now. Will continue monitoring FS, log, will Follow up.  For DC home start on Prandin 1mg PO before each meal, FS Log, FU Discussed with patient and primary team.

## 2020-01-07 NOTE — PROGRESS NOTE ADULT - PROBLEM SELECTOR PLAN 1
on Hemodialysis. tolerating it well  BP well controlled  vol ok  slightly inc pretty, on 2 pretty bath

## 2020-01-07 NOTE — PROGRESS NOTE ADULT - SUBJECTIVE AND OBJECTIVE BOX
Pt seen and examined at bedside   on Hemodialysis. tolerating dialysis well  denies chest pain,dyspnea,dizziness  no n.v.d      Allergies:  No Known Allergies    Hospital Medications:   MEDICATIONS  (STANDING):  aspirin enteric coated 81 milliGRAM(s) Oral daily  atorvastatin 20 milliGRAM(s) Oral at bedtime  carvedilol 12.5 milliGRAM(s) Oral every 12 hours  chlorhexidine 4% Liquid 1 Application(s) Topical <User Schedule>  clopidogrel Tablet 75 milliGRAM(s) Oral daily  dextrose 5%. 1000 milliLiter(s) (50 mL/Hr) IV Continuous <Continuous>  dextrose 50% Injectable 12.5 Gram(s) IV Push once  dextrose 50% Injectable 25 Gram(s) IV Push once  dextrose 50% Injectable 25 Gram(s) IV Push once  DULoxetine 20 milliGRAM(s) Oral two times a day  folic acid 1 milliGRAM(s) Oral daily  insulin glargine Injectable (LANTUS) 8 Unit(s) SubCutaneous at bedtime  insulin lispro (HumaLOG) corrective regimen sliding scale   SubCutaneous three times a day before meals  insulin lispro Injectable (HumaLOG) 6 Unit(s) SubCutaneous three times a day before meals  losartan 25 milliGRAM(s) Oral daily  senna 2 Tablet(s) Oral at bedtime  sevelamer carbonate 1600 milliGRAM(s) Oral three times a day with meals  sodium chloride 0.9% lock flush 3 milliLiter(s) IV Push every 8 hours       VITALS:  T(F): 98.6 (01-07-20 @ 10:45), Max: 99 (01-06-20 @ 17:39)  HR: 60 (01-07-20 @ 10:45)  BP: 129/52 (01-07-20 @ 10:45)  RR: 15 (01-07-20 @ 10:45)  SpO2: 96% (01-07-20 @ 10:31)  Wt(kg): --      PHYSICAL EXAM:  Constitutional: NAD. On HD  HEENT: anicteric sclera, oropharynx clear, MMM  Neck: No JVD  Respiratory: CTAB, no wheezes, rales or rhonchi  Cardiovascular: S1, S2, RRR  Gastrointestinal: BS+, soft, NT/ND  Extremities: No cyanosis or clubbing. No peripheral edema. s/p Lt AKA  Neurological: A/O x 3, no focal deficits  Psychiatric: Normal mood, normal affect  : No CVA tenderness. No kemp.   Skin: No rashes  Vascular Access:  avg    LABS:  01-07    135  |  95<L>  |  50<H>  ----------------------------<  113<H>  4.9   |  25  |  7.63<H>    Ca    10.2      07 Jan 2020 07:45  Phos  4.6     01-07  Mg     2.1     01-07    TPro  6.4  /  Alb  3.5  /  TBili  < 0.2<L>  /  DBili  < 0.2  /  AST  15  /  ALT  11  /  AlkPhos  68  01-06    Creatinine Trend: 7.63 <--, 5.74 <--, 4.18 <--, 6.61 <--, 4.92 <--, 6.41 <--, 6.49 <--, 5.32 <--                        11.7   7.59  )-----------( 195      ( 07 Jan 2020 07:45 )             37.9     Urine Studies:      RADIOLOGY & ADDITIONAL STUDIES:

## 2020-01-07 NOTE — PROGRESS NOTE ADULT - ATTENDING COMMENTS
Agree with above assessment and plan as outlined above.    - D/C today, f/u Dr. Lei Diamond MD, [facility]  BEEPER [phone]
Patient seen and examined.  Agree with above.   Continue with dapt  No further inpatient cardiac workup needed at this time.   DC planning to rehab in progress    Dom Gonsales MD
Patient seen and examined.  Agree with above.   No further inpatient cardiac workup needed at this time.   Continue with dapt  DC planning to rehab in progress    Dom Gonsales MD
Patient seen and examined.  Agree with above.   S/p RICKEY to proximal Cx - continue with dapt   Pt. with non-obstructive cad in LAD with DEONTE 3 flow; culprit lesion was the 95% proximal cx lesion; therefore will medically manage LAD stenosis for now; can consider staged PCI in the future if clinically indicated  No further inpatient cardiac workup needed at this time.   Endo eval with Dr. Dany ANAND planning for tomorrow if ok with endo, medicine, and HD set up    Dom Gonsales MD
Patient seen and examined.  Agree with above.  Discussed case with CTS / Dr. Johnson who does not think pt. is a candidate for CABG at this time  In addition, pt. and family prefer multivessel PCI  Given above, plan for staged PCI today    Dom Gonsales MD
pl call for any q's  [facility]  Pager: [phone]  cell [phone]
pl call for any q's  [facility]  Pager: [phone]  cell [phone]
pl call for any q's  [facility]  Pager: [phone]  cell [phone]
pl call for any q's  cell [phone]
pl call for any q's  cell [phone]
Patient seen and examined.  Agree with above.   Continue with dapt for RICKEY to proximal LCx  No further inpatient cardiac workup needed at this time.   DC planning to rehab in progress    Dom Gonsales MD
Patient seen and examined.  Agree with above.   Continue with dapt with asa and plavix  No further inpatient cardiac workup needed at this time.   DC planning    Dom Gonsales MD
Willie Russell MD   [facility]  Pager: [phone]  Cell: [phone]
discharge planning
discharge to subacute rehab when bed available

## 2020-01-07 NOTE — PROGRESS NOTE ADULT - ASSESSMENT
74 year old female with PMH HTN, HLD, CAD (PCI/stent LCx), PVD, CHF, IDDM, and ESRD (Tu,Th,Sat, on Dialysis last completed on Saturday) and [facility] ANIKA HICKS who presented to  ER on 12/26 with c/o high blood glucose and mechanical fall transferred to [facility] for coronary angiogram

## 2020-04-30 ENCOUNTER — EMERGENCY (EMERGENCY)
Facility: HOSPITAL | Age: 75
LOS: 1 days | End: 2020-04-30
Attending: EMERGENCY MEDICINE
Payer: COMMERCIAL

## 2020-04-30 VITALS — WEIGHT: 134.92 LBS | OXYGEN SATURATION: 84 % | RESPIRATION RATE: 34 BRPM | HEART RATE: 134 BPM

## 2020-04-30 DIAGNOSIS — T82.392A OTHER MECHANICAL COMPLICATION OF FEMORAL ARTERIAL GRAFT (BYPASS), INITIAL ENCOUNTER: Chronic | ICD-10-CM

## 2020-04-30 LAB — SARS-COV-2 RNA SPEC QL NAA+PROBE: DETECTED

## 2020-04-30 PROCEDURE — 99291 CRITICAL CARE FIRST HOUR: CPT | Mod: 25

## 2020-04-30 PROCEDURE — 31500 INSERT EMERGENCY AIRWAY: CPT

## 2020-04-30 PROCEDURE — 82962 GLUCOSE BLOOD TEST: CPT

## 2020-04-30 PROCEDURE — 87635 SARS-COV-2 COVID-19 AMP PRB: CPT

## 2020-04-30 NOTE — ED PROVIDER NOTE - CLINICAL SUMMARY MEDICAL DECISION MAKING FREE TEXT BOX
CPR continued, 4 rounds of epinephrine given, along with calcium and bicarb and insulin for possible hyperkalemia. then rhythm noted to be possible vfib, attempted shock with no success. bedside sono revealed no cardiac activity. patient was pronounced dead by me at 5:46. I spoke with sister and info CPR continued, 4 rounds of epinephrine given, along with calcium and bicarb and insulin for possible hyperkalemia. then rhythm noted to be possible vfib, attempted shock with no success. bedside sono revealed no cardiac activity. patient was pronounced dead by me at 5:46 am

## 2020-04-30 NOTE — ED PROVIDER NOTE - PHYSICAL EXAMINATION
ETT in place, questionable breath sounds in abdomen. glidescope utilized to verify tube position which was found to be dislodged. tube removed and replaced by me with adequate capnometer color change. Left above the knee amputation. right IO line in place

## 2020-04-30 NOTE — ED ADULT NURSE NOTE - CHIEF COMPLAINT QUOTE
patient brought in by EMS for witnessed arrest approx. 20 minutes before arriving to the ED. Patient intubated on the field, given 4 epi IO on RLE. BLS in progress

## 2020-04-30 NOTE — ED ADULT NURSE NOTE - OBJECTIVE STATEMENT
74 year old female presents to the ED from EMS for a witnessed cardiac arrest. Last known alive within the half hour according to family. See triage note for details.

## 2020-04-30 NOTE — ED ADULT TRIAGE NOTE - CHIEF COMPLAINT QUOTE
patient brought in by EMS for witnessed arrest approx 5:00AM. Patient intubated on the field, given 4 epi IO on RLE patient brought in by EMS for witnessed arrest approx. 20 minutes before arriving to the ED. Patient intubated on the field, given 4 epi IO on RLE. BLS in progress

## 2020-04-30 NOTE — ED PROVIDER NOTE - CRITICAL CARE PROVIDED
additional history taking/consult w/ pt's family directly relating to pts condition/direct patient care (not related to procedure)

## 2020-04-30 NOTE — ED PROVIDER NOTE - OBJECTIVE STATEMENT
UTI, took antibiotic BP was low Patient on HD Diabetes Mellitus Hypertension Coronary Artery Disease with stents, HLD. recently diagnosed with UTI, was prescribed antibiotics but hasn't started it. As per sister, she was shaking and BP was low today and heart rate was jumping around. sister called 911. EMS reports that when they were loading patient into ambulance she became unresponsive and was noted to be in cardiac arrest. CPR initiated and patient was intubated. 4 epis given, IO placed. EMS brought patient with chest compressions in process, manually ventilated. Patient on HD due today, Diabetes Mellitus Hypertension Coronary Artery Disease with stents, HLD. recently diagnosed with UTI, was prescribed antibiotics but hasn't started it. As per sister, she was shaking and BP was low today and heart rate was jumping around. sister called 911. EMS reports that when they were loading patient into ambulance she became unresponsive and was noted to be in cardiac arrest. CPR initiated and patient was intubated. 4 epis given, IO placed. EMS brought patient with chest compressions in process, manually ventilated.

## 2021-11-15 NOTE — PROGRESS NOTE ADULT - I WAS PHYSICALLY PRESENT FOR THE KEY PORTIONS OF THE EVALUATION AND MANAGEMENT (E/M) SERVICE PROVIDED.  I AGREE WITH THE ABOVE HISTORY, PHYSICAL, AND PLAN WHICH I HAVE REVIEWED AND EDITED WHERE APPROPRIATE
Statement Selected
thumb pain

## 2022-08-02 NOTE — PROGRESS NOTE ADULT - PROBLEM/PLAN-1
Routine Visit Note: LPN    Skill/education provided: pt was seen by H today to assessGI/, safety, medications and integumentary assessment. no pressure injuries or skin issues noted. pt states pain is 0/10 at this time. Pt educated on emergency plans, medications and s/s of uti. On visit pt refused entry at first thinking I had been there the day prior. I explained to pt i was here on Monday and this was Thursday. Pt agreed to visit but states she does not want anyone else coming . because she is fine now.    Patient/caregiver response:Pt will call provider if s/s of uti are noted such as burning on urination, pain or increse in frequecy.    Plan for next visit: Assess GI// /cognition    Other pertinent info: DISPLAY PLAN FREE TEXT

## 2022-08-20 NOTE — PROGRESS NOTE ADULT - PROVIDER SPECIALTY LIST ADULT
Internal Medicine Metronidazole Pregnancy And Lactation Text: This medication is Pregnancy Category B and considered safe during pregnancy.  It is also excreted in breast milk.

## 2023-01-10 NOTE — PROGRESS NOTE ADULT - PROBLEM/PLAN-3
Myalgia Monitoring: I explained this is common when taking isotretinoin. If this worsens they will contact us. DISPLAY PLAN FREE TEXT

## 2023-07-23 NOTE — PROGRESS NOTE ADULT - PROBLEM SELECTOR PROBLEM 5
CAD (coronary artery disease)
The Delivery OB Provider certifies that vaginal examination and/or abdominal examination after the delivery was done and no foreign body was found.

## 2024-08-09 NOTE — PROGRESS NOTE ADULT - PROBLEM SELECTOR PROBLEM 2
Called patient to discuss current symptoms advised per provider rotate tylenol and motrin until post op.   
Hyperglycemia
5

## 2024-08-19 NOTE — ED ADULT TRIAGE NOTE - ACCOMPANIED BY
Left message for patient to schedule vision therapy. This is the final attempt to contact him.   Immediate family member

## 2025-02-13 NOTE — PROGRESS NOTE ADULT - PROBLEM/PLAN-1
CHIEF COMPLAINT:  Tracy Kovacs is here today for Subsequent Annual Medicare Wellness Visit.      Medication verified, no changes    Refills needed today?  Yes         Patient would like communication of their results via:   Cell Phone:   Telephone Information:   Mobile [phone]     Okay to leave a message containing results? Yes     Cholesterol (mg/dL)   Date Value   02/06/2025 113     HDL (mg/dL)   Date Value   02/06/2025 55     No components found for: \"CHOLHDLRATIO\"  Triglycerides (mg/dL)   Date Value   02/06/2025 47     LDL (mg/dL)   Date Value   02/06/2025 49      Glucose (mg/dL)   Date Value   02/06/2025 89          Health Maintenance       COVID-19 Vaccine (6 - 2024-25 season)  Overdue since 9/1/2024    Medicare Advantage- Medicare Wellness Visit (Yearly - January to December)  Due since 1/1/2025           Following review of the above:  Patient wishes to discuss with clinician: COVID-19    Note: Refer to final orders and clinician documentation.           DISPLAY PLAN FREE TEXT